# Patient Record
Sex: MALE | Race: WHITE | Employment: OTHER
[De-identification: names, ages, dates, MRNs, and addresses within clinical notes are randomized per-mention and may not be internally consistent; named-entity substitution may affect disease eponyms.]

---

## 2023-03-27 SDOH — HEALTH STABILITY: PHYSICAL HEALTH: ON AVERAGE, HOW MANY DAYS PER WEEK DO YOU ENGAGE IN MODERATE TO STRENUOUS EXERCISE (LIKE A BRISK WALK)?: 3 DAYS

## 2023-03-27 SDOH — HEALTH STABILITY: PHYSICAL HEALTH: ON AVERAGE, HOW MANY MINUTES DO YOU ENGAGE IN EXERCISE AT THIS LEVEL?: 30 MIN

## 2023-03-29 ENCOUNTER — OFFICE VISIT (OUTPATIENT)
Dept: INTERNAL MEDICINE CLINIC | Facility: CLINIC | Age: 63
End: 2023-03-29
Payer: MEDICARE

## 2023-03-29 VITALS
HEIGHT: 68 IN | HEART RATE: 76 BPM | DIASTOLIC BLOOD PRESSURE: 73 MMHG | BODY MASS INDEX: 24.22 KG/M2 | SYSTOLIC BLOOD PRESSURE: 130 MMHG | WEIGHT: 159.8 LBS | TEMPERATURE: 98 F | OXYGEN SATURATION: 93 %

## 2023-03-29 DIAGNOSIS — Z11.4 ENCOUNTER FOR SCREENING FOR HIV: ICD-10-CM

## 2023-03-29 DIAGNOSIS — Z76.89 ESTABLISHING CARE WITH NEW DOCTOR, ENCOUNTER FOR: Primary | ICD-10-CM

## 2023-03-29 DIAGNOSIS — Z86.69 HX OF RETINAL DETACHMENT: ICD-10-CM

## 2023-03-29 DIAGNOSIS — H91.93 BILATERAL HEARING LOSS, UNSPECIFIED HEARING LOSS TYPE: ICD-10-CM

## 2023-03-29 DIAGNOSIS — R06.2 WHEEZING: ICD-10-CM

## 2023-03-29 DIAGNOSIS — Z11.59 NEED FOR HEPATITIS C SCREENING TEST: ICD-10-CM

## 2023-03-29 DIAGNOSIS — E78.00 PURE HYPERCHOLESTEROLEMIA: Primary | ICD-10-CM

## 2023-03-29 DIAGNOSIS — G47.30 SLEEP APNEA, UNSPECIFIED TYPE: ICD-10-CM

## 2023-03-29 DIAGNOSIS — Z98.890 HX OF INGUINAL HERNIA REPAIR: ICD-10-CM

## 2023-03-29 DIAGNOSIS — L82.1 SEBORRHEIC KERATOSIS: ICD-10-CM

## 2023-03-29 DIAGNOSIS — R79.9 ABNORMAL FINDING OF BLOOD CHEMISTRY, UNSPECIFIED: ICD-10-CM

## 2023-03-29 DIAGNOSIS — Z87.19 HX OF INGUINAL HERNIA REPAIR: ICD-10-CM

## 2023-03-29 DIAGNOSIS — E55.9 VITAMIN D DEFICIENCY: ICD-10-CM

## 2023-03-29 DIAGNOSIS — J45.20 MILD INTERMITTENT ASTHMA, UNSPECIFIED WHETHER COMPLICATED: ICD-10-CM

## 2023-03-29 DIAGNOSIS — J30.2 SEASONAL ALLERGIES: ICD-10-CM

## 2023-03-29 DIAGNOSIS — R53.82 CHRONIC FATIGUE: ICD-10-CM

## 2023-03-29 DIAGNOSIS — Z13.220 LIPID SCREENING: ICD-10-CM

## 2023-03-29 LAB
ALBUMIN SERPL-MCNC: 3.8 G/DL (ref 3.2–4.6)
ALBUMIN/GLOB SERPL: 1.2 (ref 0.4–1.6)
ALP SERPL-CCNC: 60 U/L (ref 50–136)
ALT SERPL-CCNC: 35 U/L (ref 12–65)
ANION GAP SERPL CALC-SCNC: 2 MMOL/L (ref 2–11)
AST SERPL-CCNC: 27 U/L (ref 15–37)
BASOPHILS # BLD: 0.1 K/UL (ref 0–0.2)
BASOPHILS NFR BLD: 2 % (ref 0–2)
BILIRUB SERPL-MCNC: 0.4 MG/DL (ref 0.2–1.1)
BUN SERPL-MCNC: 33 MG/DL (ref 8–23)
CALCIUM SERPL-MCNC: 9.3 MG/DL (ref 8.3–10.4)
CHLORIDE SERPL-SCNC: 111 MMOL/L (ref 101–110)
CHOLEST SERPL-MCNC: 179 MG/DL
CO2 SERPL-SCNC: 27 MMOL/L (ref 21–32)
CREAT SERPL-MCNC: 0.9 MG/DL (ref 0.8–1.5)
DIFFERENTIAL METHOD BLD: ABNORMAL
EOSINOPHIL # BLD: 0.5 K/UL (ref 0–0.8)
EOSINOPHIL NFR BLD: 10 % (ref 0.5–7.8)
ERYTHROCYTE [DISTWIDTH] IN BLOOD BY AUTOMATED COUNT: 14.4 % (ref 11.9–14.6)
GLOBULIN SER CALC-MCNC: 3.2 G/DL (ref 2.8–4.5)
GLUCOSE SERPL-MCNC: 84 MG/DL (ref 65–100)
HCT VFR BLD AUTO: 38.1 % (ref 41.1–50.3)
HDLC SERPL-MCNC: 32 MG/DL (ref 40–60)
HDLC SERPL: 5.6
HGB BLD-MCNC: 11.8 G/DL (ref 13.6–17.2)
HIV 1+2 AB+HIV1 P24 AG SERPL QL IA: NONREACTIVE
HIV 1/2 RESULT COMMENT: NORMAL
IMM GRANULOCYTES # BLD AUTO: 0 K/UL (ref 0–0.5)
IMM GRANULOCYTES NFR BLD AUTO: 0 % (ref 0–5)
LDLC SERPL CALC-MCNC: 128.8 MG/DL
LYMPHOCYTES # BLD: 1.6 K/UL (ref 0.5–4.6)
LYMPHOCYTES NFR BLD: 35 % (ref 13–44)
MCH RBC QN AUTO: 29.6 PG (ref 26.1–32.9)
MCHC RBC AUTO-ENTMCNC: 31 G/DL (ref 31.4–35)
MCV RBC AUTO: 95.7 FL (ref 82–102)
MONOCYTES # BLD: 0.6 K/UL (ref 0.1–1.3)
MONOCYTES NFR BLD: 13 % (ref 4–12)
NEUTS SEG # BLD: 1.8 K/UL (ref 1.7–8.2)
NEUTS SEG NFR BLD: 40 % (ref 43–78)
NRBC # BLD: 0 K/UL (ref 0–0.2)
PLATELET # BLD AUTO: 312 K/UL (ref 150–450)
PMV BLD AUTO: 10.5 FL (ref 9.4–12.3)
POTASSIUM SERPL-SCNC: 4.4 MMOL/L (ref 3.5–5.1)
PROT SERPL-MCNC: 7 G/DL (ref 6.3–8.2)
RBC # BLD AUTO: 3.98 M/UL (ref 4.23–5.6)
SODIUM SERPL-SCNC: 140 MMOL/L (ref 133–143)
TRIGL SERPL-MCNC: 91 MG/DL (ref 35–150)
TSH W FREE THYROID IF ABNORMAL: 0.99 UIU/ML (ref 0.36–3.74)
VLDLC SERPL CALC-MCNC: 18.2 MG/DL (ref 6–23)
WBC # BLD AUTO: 4.5 K/UL (ref 4.3–11.1)

## 2023-03-29 PROCEDURE — 99204 OFFICE O/P NEW MOD 45 MIN: CPT | Performed by: INTERNAL MEDICINE

## 2023-03-29 RX ORDER — MULTIVITAMINS WITH FLUORIDE 0.25 MG
1 TABLET,CHEWABLE ORAL DAILY
COMMUNITY
Start: 2011-04-08

## 2023-03-29 RX ORDER — MULTIVIT WITH MIN/MFOLATE/K2 340-15/3 G
5000 POWDER (GRAM) ORAL DAILY
Qty: 90 CAPSULE | Refills: 3 | Status: SHIPPED | OUTPATIENT
Start: 2023-03-29

## 2023-03-29 RX ORDER — PAROXETINE HYDROCHLORIDE 20 MG/1
20 TABLET, FILM COATED ORAL DAILY
COMMUNITY
Start: 2016-08-17

## 2023-03-29 RX ORDER — LEVOCETIRIZINE DIHYDROCHLORIDE 5 MG/1
5 TABLET, FILM COATED ORAL NIGHTLY
Qty: 30 TABLET | Refills: 3 | Status: SHIPPED | OUTPATIENT
Start: 2023-03-29

## 2023-03-29 RX ORDER — ALBUTEROL SULFATE 90 UG/1
2 AEROSOL, METERED RESPIRATORY (INHALATION) 4 TIMES DAILY PRN
Qty: 54 G | Refills: 1 | Status: SHIPPED | OUTPATIENT
Start: 2023-03-29

## 2023-03-29 SDOH — ECONOMIC STABILITY: INCOME INSECURITY: HOW HARD IS IT FOR YOU TO PAY FOR THE VERY BASICS LIKE FOOD, HOUSING, MEDICAL CARE, AND HEATING?: NOT HARD AT ALL

## 2023-03-29 SDOH — ECONOMIC STABILITY: FOOD INSECURITY: WITHIN THE PAST 12 MONTHS, YOU WORRIED THAT YOUR FOOD WOULD RUN OUT BEFORE YOU GOT MONEY TO BUY MORE.: NEVER TRUE

## 2023-03-29 SDOH — ECONOMIC STABILITY: FOOD INSECURITY: WITHIN THE PAST 12 MONTHS, THE FOOD YOU BOUGHT JUST DIDN'T LAST AND YOU DIDN'T HAVE MONEY TO GET MORE.: NEVER TRUE

## 2023-03-29 SDOH — ECONOMIC STABILITY: HOUSING INSECURITY
IN THE LAST 12 MONTHS, WAS THERE A TIME WHEN YOU DID NOT HAVE A STEADY PLACE TO SLEEP OR SLEPT IN A SHELTER (INCLUDING NOW)?: NO

## 2023-03-29 ASSESSMENT — ENCOUNTER SYMPTOMS
SINUS PRESSURE: 0
EYES NEGATIVE: 1
WHEEZING: 1
DIARRHEA: 0
NAUSEA: 0
CHEST TIGHTNESS: 0
CONSTIPATION: 0
VOMITING: 0
RHINORRHEA: 0
SHORTNESS OF BREATH: 0
SINUS PAIN: 0

## 2023-03-29 ASSESSMENT — ANXIETY QUESTIONNAIRES
1. FEELING NERVOUS, ANXIOUS, OR ON EDGE: 2
GAD7 TOTAL SCORE: 14
2. NOT BEING ABLE TO STOP OR CONTROL WORRYING: 2
7. FEELING AFRAID AS IF SOMETHING AWFUL MIGHT HAPPEN: 2
4. TROUBLE RELAXING: 2
6. BECOMING EASILY ANNOYED OR IRRITABLE: 2
IF YOU CHECKED OFF ANY PROBLEMS ON THIS QUESTIONNAIRE, HOW DIFFICULT HAVE THESE PROBLEMS MADE IT FOR YOU TO DO YOUR WORK, TAKE CARE OF THINGS AT HOME, OR GET ALONG WITH OTHER PEOPLE: SOMEWHAT DIFFICULT
3. WORRYING TOO MUCH ABOUT DIFFERENT THINGS: 2
5. BEING SO RESTLESS THAT IT IS HARD TO SIT STILL: 2

## 2023-03-29 ASSESSMENT — PATIENT HEALTH QUESTIONNAIRE - PHQ9
SUM OF ALL RESPONSES TO PHQ QUESTIONS 1-9: 1
2. FEELING DOWN, DEPRESSED OR HOPELESS: 0
1. LITTLE INTEREST OR PLEASURE IN DOING THINGS: 1
SUM OF ALL RESPONSES TO PHQ QUESTIONS 1-9: 1
SUM OF ALL RESPONSES TO PHQ QUESTIONS 1-9: 1
SUM OF ALL RESPONSES TO PHQ9 QUESTIONS 1 & 2: 1
SUM OF ALL RESPONSES TO PHQ QUESTIONS 1-9: 1

## 2023-03-29 NOTE — ASSESSMENT & PLAN NOTE
Referring to [facility] [place] [facility] for routine exam and continued treatmetn

## 2023-03-29 NOTE — PROGRESS NOTES
shaped. Forehead left sided 1.2 cm linear. 14. Hx of inguinal hernia repair  Assessment & Plan:   Right sided inguinal hernia repair that causes discomfort with use of tighter clothing. Wearing pants several sizes too large to compensate. SUBJECTIVE/OBJECTIVE:  HPI: Patient is a very pleasant 58-year-old man originally from [place] recently transported to the [facility] presenting to establish care with new PCP. Patient with medical history significant for right-sided inguinal hernia repair, retinal detachment, bilateral hearing loss with use of hearing aids, asthma, seasonal allergies, vitamin D deficiency. Patient is agreeable to routine labs at this time including TSH, CBC, CMP, vitamin D, lipid. We will screen for hepatitis C as well as HIV at this time. Referring at this time to pulmonology ophthalmology ear nose and throat and sleep medicine. Social History     Tobacco Use    Smoking status: Never    Smokeless tobacco: Never   Vaping Use    Vaping Use: Never used   Substance Use Topics    Alcohol use: Yes    Drug use: Never     Vitals:    03/29/23 0907   BP: 130/73   Site: Right Upper Arm   Position: Sitting   Cuff Size: Large Adult   Pulse: 76   Temp: 98 °F (36.7 °C)   TempSrc: Temporal   SpO2: 93%   Weight: 159 lb 12.8 oz (72.5 kg)   Height: 5' 8\" (1.727 m)      Body mass index is 24.3 kg/m². Physical Exam  HENT:      Head: Normocephalic. Ears:      Comments: Bilateral hearing aids in place. Eyes:      Extraocular Movements: Extraocular movements intact. Cardiovascular:      Rate and Rhythm: Normal rate and regular rhythm. Pulses: Normal pulses. Skin:     General: Skin is warm and dry. An electronic signature was used to authenticate this note.   Huy Haque DO
dementia and alzhimers runs in family; forgetting recent conversation and common places. Objective   Physical Exam  Constitutional:       Appearance: Normal appearance. He is normal weight. HENT:      Head: Normocephalic and atraumatic. Nose: Nose normal.      Mouth/Throat:      Mouth: Mucous membranes are moist.   Eyes:      Extraocular Movements: Extraocular movements intact. Pupils: Pupils are equal, round, and reactive to light. Cardiovascular:      Rate and Rhythm: Normal rate and regular rhythm. Pulses: Normal pulses. Heart sounds: Normal heart sounds. Pulmonary:      Effort: Pulmonary effort is normal.      Breath sounds: Normal breath sounds. Abdominal:      General: Abdomen is flat. Musculoskeletal:         General: Normal range of motion. Cervical back: Normal range of motion. Skin:     General: Skin is warm and dry. Findings: Lesion present. Comments: Midback lesion; seborrheic keratosis   Neurological:      General: No focal deficit present. Mental Status: He is alert and oriented to person, place, and time. Mental status is at baseline. {Time Documentation Optional:[id number]}      An electronic signature was used to authenticate this note.     --Mariluz Bernabe

## 2023-03-29 NOTE — ASSESSMENT & PLAN NOTE
Right sided inguinal hernia repair that causes discomfort with use of tighter clothing. Wearing pants several sizes too large to compensate.

## 2023-03-29 NOTE — ASSESSMENT & PLAN NOTE
Offering referral to Pulmonology for PFT's Remote dx of asthma. No albugterol prescribed wheezing prominent. Offering Albuterol at this time.

## 2023-03-29 NOTE — ASSESSMENT & PLAN NOTE
Presenting to establish care today. NO medical information currently available in EMR. Age eligible for all routine screening and vaccination methods.

## 2023-03-30 LAB
25(OH)D3 SERPL-MCNC: 28.4 NG/ML (ref 30–100)
EST. AVERAGE GLUCOSE BLD GHB EST-MCNC: 100 MG/DL
HBA1C MFR BLD: 5.1 % (ref 4.8–5.6)
HCV AB SER QL: NONREACTIVE

## 2023-03-30 RX ORDER — ATORVASTATIN CALCIUM 20 MG/1
20 TABLET, FILM COATED ORAL DAILY
Qty: 30 TABLET | Refills: 3 | Status: SHIPPED | OUTPATIENT
Start: 2023-03-30

## 2023-04-24 ENCOUNTER — OFFICE VISIT (OUTPATIENT)
Dept: INTERNAL MEDICINE CLINIC | Facility: CLINIC | Age: 63
End: 2023-04-24
Payer: MEDICARE

## 2023-04-24 VITALS
HEART RATE: 65 BPM | OXYGEN SATURATION: 95 % | DIASTOLIC BLOOD PRESSURE: 81 MMHG | TEMPERATURE: 98 F | HEIGHT: 68 IN | BODY MASS INDEX: 23.58 KG/M2 | WEIGHT: 155.6 LBS | SYSTOLIC BLOOD PRESSURE: 139 MMHG

## 2023-04-24 DIAGNOSIS — J45.20 MILD INTERMITTENT ASTHMA, UNSPECIFIED WHETHER COMPLICATED: Primary | ICD-10-CM

## 2023-04-24 DIAGNOSIS — F41.9 ANXIETY: ICD-10-CM

## 2023-04-24 DIAGNOSIS — E78.00 PURE HYPERCHOLESTEROLEMIA: ICD-10-CM

## 2023-04-24 DIAGNOSIS — E78.00 HIGH CHOLESTEROL: ICD-10-CM

## 2023-04-24 DIAGNOSIS — R06.2 WHEEZING: ICD-10-CM

## 2023-04-24 DIAGNOSIS — E55.9 VITAMIN D DEFICIENCY: ICD-10-CM

## 2023-04-24 PROCEDURE — 99214 OFFICE O/P EST MOD 30 MIN: CPT | Performed by: INTERNAL MEDICINE

## 2023-04-24 RX ORDER — PAROXETINE HYDROCHLORIDE 20 MG/1
20 TABLET, FILM COATED ORAL DAILY
Qty: 90 TABLET | Refills: 1 | Status: SHIPPED | OUTPATIENT
Start: 2023-04-24 | End: 2023-07-23

## 2023-04-24 RX ORDER — ATORVASTATIN CALCIUM 40 MG/1
40 TABLET, FILM COATED ORAL DAILY
Qty: 90 TABLET | Refills: 1 | Status: SHIPPED | OUTPATIENT
Start: 2023-04-24 | End: 2023-07-23

## 2023-04-24 RX ORDER — ALBUTEROL SULFATE 90 UG/1
2 AEROSOL, METERED RESPIRATORY (INHALATION) 4 TIMES DAILY PRN
Qty: 54 G | Refills: 3 | Status: SHIPPED | OUTPATIENT
Start: 2023-04-24

## 2023-04-24 ASSESSMENT — PATIENT HEALTH QUESTIONNAIRE - PHQ9
2. FEELING DOWN, DEPRESSED OR HOPELESS: 0
SUM OF ALL RESPONSES TO PHQ QUESTIONS 1-9: 0
1. LITTLE INTEREST OR PLEASURE IN DOING THINGS: 0
SUM OF ALL RESPONSES TO PHQ9 QUESTIONS 1 & 2: 0

## 2023-04-24 NOTE — ASSESSMENT & PLAN NOTE
Worsening symptoms with relocation. Only using albuterol as rescue. Offering Qvar bid at this time as step up therapy.

## 2023-04-24 NOTE — ACP (ADVANCE CARE PLANNING)
Advance Care Planning   The patient has the following advanced directives on file:  Advance Directives       Power of [address] ACP-Advance Directive ACP-Power of     Not on File Not on File Not on File Not on File            The patient has appointed the following active healthcare agents:    Primary Decision Maker: [facility] - [phone]    The Patient has the following current code status:    Code Status: Not on file    Visit Documentation:  I discussed [address] with Stephanie Alegria today which included the importance of making their choices for care and treatment in the case of a health event that adversely affects their decision-making abilities. He has not completed the Advance Care Directives. He does not have an active health care agent at this time. Stephanie Alegria was encouraged to complete the declaration forms and provide a signed copy of his medical records.        Aman Hurley  4/24/2023

## 2023-04-24 NOTE — ASSESSMENT & PLAN NOTE
Patient with increase in asthmatic symptoms after relocating to the area. Prescribed albuterol alone. Offering Qvar as ICS step up.

## 2023-04-24 NOTE — ASSESSMENT & PLAN NOTE
Lab Results   Component Value Date    CHOL 179 03/29/2023     Lab Results   Component Value Date    TRIG 91 03/29/2023     Lab Results   Component Value Date    HDL 32 (L) 03/29/2023     Lab Results   Component Value Date    LDLCALC 128.8 (H) 03/29/2023     Lab Results   Component Value Date    LABVLDL 18.2 03/29/2023     Lab Results   Component Value Date    CHOLHDLRATIO 5.6 03/29/2023     Key Hyperlipidemia Meds          atorvastatin (LIPITOR) 20 MG tablet (Taking)    Sig - Route: Take 1 tablet by mouth daily - Oral        Will continue at increased dose of 40 mg nightly.    The 10-year ASCVD risk score (Bryon DK, et al., 2019) is: 15.2%    Values used to calculate the score:      Age: 60 years      Sex: Male      Is Non- : No      Diabetic: No      Tobacco smoker: No      Systolic Blood Pressure: 413 mmHg      Is BP treated: No      HDL Cholesterol: 32 MG/DL      Total Cholesterol: 179 MG/DL

## 2023-04-24 NOTE — PROGRESS NOTES
Susy Ludwig (: 1960) is a 60 y.o. male, here for evaluation of the following chief complaint(s):  Discuss Labs (Pt is here to discuss previous lab results.)       ASSESSMENT/PLAN:  1. Mild intermittent asthma, unspecified whether complicated  Assessment & Plan:  Patient with increase in asthmatic symptoms after relocating to the area. Prescribed albuterol alone. Offering Qvar as ICS step up. Orders:  -     beclomethasone (QVAR REDIHALER) 40 MCG/ACT AERB inhaler; Inhale 1 puff into the lungs in the morning and 1 puff in the evening. Rinse mouth after use. ., Disp-2 each, R-3Normal  2. Pure hypercholesterolemia  -     atorvastatin (LIPITOR) 40 MG tablet; Take 1 tablet by mouth daily, Disp-90 tablet, R-1Normal  3. Wheezing  Assessment & Plan:   Worsening symptoms with relocation. Only using albuterol as rescue. Offering Qvar bid at this time as step up therapy. Orders:  -     albuterol sulfate HFA (VENTOLIN HFA) 108 (90 Base) MCG/ACT inhaler; Inhale 2 puffs into the lungs 4 times daily as needed for Wheezing, Disp-54 g, R-3Normal  4. Anxiety  Assessment & Plan: Tolerant of Paxil. Will refill. Orders:  -     PARoxetine (PAXIL) 20 MG tablet; Take 1 tablet by mouth daily, Disp-90 tablet, R-1Normal  5. High cholesterol  Assessment & Plan:  Lab Results   Component Value Date    CHOL 179 2023     Lab Results   Component Value Date    TRIG 91 2023     Lab Results   Component Value Date    HDL 32 (L) 2023     Lab Results   Component Value Date    LDLCALC 128.8 (H) 2023     Lab Results   Component Value Date    LABVLDL 18.2 2023     Lab Results   Component Value Date    CHOLHDLRATIO 5.6 2023     Key Hyperlipidemia Meds            atorvastatin (LIPITOR) 20 MG tablet (Taking)    Sig - Route: Take 1 tablet by mouth daily - Oral          Will continue at increased dose of 40 mg nightly.    The 10-year ASCVD risk score (Bryon ESQUIVEL, et al., 2019) is: 15.2%    Values used to

## 2023-04-28 PROBLEM — Z11.59 NEED FOR HEPATITIS C SCREENING TEST: Status: RESOLVED | Noted: 2023-03-29 | Resolved: 2023-04-28

## 2023-04-28 PROBLEM — Z11.4 ENCOUNTER FOR SCREENING FOR HIV: Status: RESOLVED | Noted: 2023-03-29 | Resolved: 2023-04-28

## 2023-04-28 PROBLEM — Z13.220 LIPID SCREENING: Status: RESOLVED | Noted: 2023-03-29 | Resolved: 2023-04-28

## 2023-05-17 DIAGNOSIS — E78.00 PURE HYPERCHOLESTEROLEMIA: ICD-10-CM

## 2023-05-17 DIAGNOSIS — F41.9 ANXIETY: ICD-10-CM

## 2023-05-17 DIAGNOSIS — R06.2 WHEEZING: ICD-10-CM

## 2023-05-17 RX ORDER — ATORVASTATIN CALCIUM 40 MG/1
40 TABLET, FILM COATED ORAL DAILY
Qty: 90 TABLET | Refills: 1 | Status: SHIPPED | OUTPATIENT
Start: 2023-05-17 | End: 2023-08-15

## 2023-05-17 RX ORDER — LEVOCETIRIZINE DIHYDROCHLORIDE 5 MG/1
5 TABLET, FILM COATED ORAL NIGHTLY
Qty: 90 TABLET | Refills: 1 | Status: SHIPPED | OUTPATIENT
Start: 2023-05-17 | End: 2023-08-15

## 2023-05-17 RX ORDER — PAROXETINE HYDROCHLORIDE 20 MG/1
20 TABLET, FILM COATED ORAL DAILY
Qty: 90 TABLET | Refills: 1 | Status: SHIPPED | OUTPATIENT
Start: 2023-05-17 | End: 2023-08-15

## 2023-05-17 NOTE — TELEPHONE ENCOUNTER
----- Message from Silverio Kaur sent at 5/16/2023  9:32 AM EDT -----  Subject: Message to Provider    QUESTIONS  Information for Provider? Johanna calling from [employer]. The PT is   requesting to have 3 medications changed to home delivery with 90 day   supply RX. The meds are levocetirizine (XYZAL) 5 MG tablet, atorvastatin   (LIPITOR) 40 MG tablet, and PARoxetine (PAXIL) 20 MG tablet. Any agent   that answers when called can assist with this. It does not have to be   Johanna.   ---------------------------------------------------------------------------  --------------  [address]  [phone]; Do not leave any message, patient will call back for answer  ---------------------------------------------------------------------------  --------------  SCRIPT ANSWERS  Relationship to Patient? Covered Entity  Covered Entity Type? Pharmacy? Representative Name?  Kenia Mac

## 2023-05-23 ENCOUNTER — NURSE ONLY (OUTPATIENT)
Dept: PULMONOLOGY | Age: 63
End: 2023-05-23
Payer: MEDICARE

## 2023-05-23 DIAGNOSIS — R06.2 WHEEZING: Primary | ICD-10-CM

## 2023-05-23 LAB
FEV 1 , POC: 1.72 L
FEV1 % PRED, POC: 50 %
FEV1/FVC, POC: NORMAL
FVC % PRED, POC: 65 %
FVC, POC: NORMAL

## 2023-05-23 PROCEDURE — 94060 EVALUATION OF WHEEZING: CPT | Performed by: INTERNAL MEDICINE

## 2023-05-23 PROCEDURE — 94729 DIFFUSING CAPACITY: CPT | Performed by: INTERNAL MEDICINE

## 2023-05-23 PROCEDURE — 94726 PLETHYSMOGRAPHY LUNG VOLUMES: CPT | Performed by: INTERNAL MEDICINE

## 2023-05-23 ASSESSMENT — PULMONARY FUNCTION TESTS
FEV1_PERCENT_PREDICTED_POC: 50
FVC_PERCENT_PREDICTED_POC: 65

## 2023-06-06 ENCOUNTER — OFFICE VISIT (OUTPATIENT)
Dept: SLEEP MEDICINE | Age: 63
End: 2023-06-06
Payer: MEDICARE

## 2023-06-06 VITALS
WEIGHT: 159.8 LBS | DIASTOLIC BLOOD PRESSURE: 60 MMHG | OXYGEN SATURATION: 96 % | TEMPERATURE: 97.3 F | HEART RATE: 84 BPM | HEIGHT: 68 IN | SYSTOLIC BLOOD PRESSURE: 120 MMHG | RESPIRATION RATE: 18 BRPM | BODY MASS INDEX: 24.22 KG/M2

## 2023-06-06 DIAGNOSIS — G47.33 OSA (OBSTRUCTIVE SLEEP APNEA): ICD-10-CM

## 2023-06-06 DIAGNOSIS — G25.81 RLS (RESTLESS LEGS SYNDROME): ICD-10-CM

## 2023-06-06 DIAGNOSIS — G47.34 NOCTURNAL HYPOXEMIA: ICD-10-CM

## 2023-06-06 DIAGNOSIS — G47.52 REM BEHAVIORAL DISORDER: Primary | ICD-10-CM

## 2023-06-06 PROCEDURE — 99203 OFFICE O/P NEW LOW 30 MIN: CPT | Performed by: STUDENT IN AN ORGANIZED HEALTH CARE EDUCATION/TRAINING PROGRAM

## 2023-06-06 RX ORDER — CLONAZEPAM 0.5 MG/1
TABLET ORAL
Qty: 60 TABLET | Refills: 1 | Status: SHIPPED | OUTPATIENT
Start: 2023-06-06 | End: 2023-08-05

## 2023-06-06 NOTE — PATIENT INSTRUCTIONS
Content Version: 13.6  © 2006-2023 Healthwise, [employer]. Care instructions adapted under license by [facility] ([facility]). If you have questions about a medical condition or this instruction, always ask your healthcare professional. [address] any warranty or liability for your use of this information. Sleep Hygiene Instructions    Sleep only as much as you need to feel refreshed during the following day. Restricting your time in bed helps to consolidate and deepen your sleep. Excessively long times in bed lead to fragmented and shallow sleep. Get up at your regular time the next day, no matter how little your slept. Get up at the same time each day, 7 days a week. A regular wake time in the morning leads to regular times on sleep onset, and helps to set your biological clock. Exercise regularly. Schedule exercise times so that they do not occur within 3 hours of when you intend to go to bed. Exercise makes it easier to initiate sleep and deepen sleep. Don't take your problems to bed. Plan some time earlier in the evening for working on your problems or planning the next day's activities. Worrying may interfere with initiating sleep and produce shallow sleep. Train yourself to use the bedroom only for sleep and sexual activity. This will help condition your brain to see bed as the place for sleeping. Do not read, watch TV or eat in bed. Do not try and fall asleep. This only makes the problem worse. Instead, turn on the light, leave the bedroom, and do something different like reading a book. Don't engage in stimulating activity. Return to bed only when you feel sleepy. Avoid long naps. Staying awake during the day helps to fall asleep at night. Naps totalling more than 30 minutes increase your chances of having trouble sleeping at night. Make sure that your bedroom is comfortable and free from light and noise.  A comfortable, noise-free sleep environment

## 2023-06-23 ENCOUNTER — HOSPITAL ENCOUNTER (OUTPATIENT)
Dept: SLEEP CENTER | Age: 63
Discharge: HOME | End: 2023-06-26
Payer: COMMERCIAL

## 2023-06-23 PROCEDURE — 95811 POLYSOM 6/>YRS CPAP 4/> PARM: CPT

## 2023-07-12 ENCOUNTER — OFFICE VISIT (OUTPATIENT)
Dept: AUDIOLOGY | Age: 63
End: 2023-07-12
Payer: MEDICARE

## 2023-07-12 ENCOUNTER — TELEPHONE (OUTPATIENT)
Dept: SLEEP MEDICINE | Age: 63
End: 2023-07-12

## 2023-07-12 ENCOUNTER — OFFICE VISIT (OUTPATIENT)
Dept: ENT CLINIC | Age: 63
End: 2023-07-12
Payer: MEDICARE

## 2023-07-12 VITALS
WEIGHT: 163 LBS | HEART RATE: 75 BPM | HEIGHT: 68 IN | BODY MASS INDEX: 24.71 KG/M2 | RESPIRATION RATE: 17 BRPM | OXYGEN SATURATION: 98 %

## 2023-07-12 DIAGNOSIS — G47.33 OSA (OBSTRUCTIVE SLEEP APNEA): Primary | ICD-10-CM

## 2023-07-12 DIAGNOSIS — H90.3 SENSORINEURAL HEARING LOSS, BILATERAL: Primary | ICD-10-CM

## 2023-07-12 DIAGNOSIS — H90.3 SENSORINEURAL HEARING LOSS (SNHL) OF BOTH EARS: Primary | ICD-10-CM

## 2023-07-12 DIAGNOSIS — H61.23 BILATERAL IMPACTED CERUMEN: ICD-10-CM

## 2023-07-12 PROCEDURE — 99203 OFFICE O/P NEW LOW 30 MIN: CPT | Performed by: STUDENT IN AN ORGANIZED HEALTH CARE EDUCATION/TRAINING PROGRAM

## 2023-07-12 PROCEDURE — 92557 COMPREHENSIVE HEARING TEST: CPT | Performed by: AUDIOLOGIST

## 2023-07-12 PROCEDURE — G0268 REMOVAL OF IMPACTED WAX MD: HCPCS | Performed by: STUDENT IN AN ORGANIZED HEALTH CARE EDUCATION/TRAINING PROGRAM

## 2023-07-12 ASSESSMENT — ENCOUNTER SYMPTOMS
DIARRHEA: 0
WHEEZING: 0
SINUS PAIN: 0
FACIAL SWELLING: 0
APNEA: 0
SINUS PRESSURE: 0
NAUSEA: 0
EYE PAIN: 0
COUGH: 0
SHORTNESS OF BREATH: 0
CONSTIPATION: 0
STRIDOR: 0
EYE ITCHING: 0
EYE DISCHARGE: 0
CHOKING: 0

## 2023-07-12 NOTE — PROGRESS NOTES
HPI:  Xi Watts is a 60 y.o. male seen New    Chief Complaint   Patient presents with    Hearing Problem     Patient presents today for hearing screens , patient would like to update current HA and has recently moved to [place] . Patient has cerumen present and needs debridement before test .        79-year-old male seen as a new patient referral evaluation with a history of hearing loss. He and his wife just moved here to [place] from [place]. He has been using hearing aids for the last couple years with good benefit. He is potentially seeking advice on new hearing aids. His hearing has been relatively stable. He has had some cerumen in the past.  He denies any current otalgia otorrhea tinnitus dizziness or vertigo. No significant otologic history over his lifetime. Past Medical History, Past Surgical History, Family history, Social History, and Medications were all reviewed with the patient today and updated as necessary.      Allergies   Allergen Reactions    Cat Hair Extract Shortness Of Breath       Patient Active Problem List   Diagnosis    Establishing care with new doctor, encounter for    Sleep apnea    Bilateral hearing loss    Vitamin D deficiency    Chronic fatigue    Seasonal allergies    Mild intermittent asthma    Hx of retinal detachment    Seborrheic keratosis    Wheezing    Hx of inguinal hernia repair    Anxiety    High cholesterol       Current Outpatient Medications   Medication Sig    clonazePAM (KLONOPIN) 0.5 MG tablet Take 1-2 tablets nightly for REM Behavior disorder    atorvastatin (LIPITOR) 40 MG tablet Take 1 tablet by mouth daily    levocetirizine (XYZAL) 5 MG tablet Take 1 tablet by mouth nightly    PARoxetine (PAXIL) 20 MG tablet Take 1 tablet by mouth daily    albuterol sulfate HFA (VENTOLIN HFA) 108 (90 Base) MCG/ACT inhaler Inhale 2 puffs into the lungs 4 times daily as needed for Wheezing    beclomethasone (QVAR REDIHALER) 40 MCG/ACT AERB inhaler Inhale 1 puff into

## 2023-07-12 NOTE — TELEPHONE ENCOUNTER
Patient had recent sleep study showing mild sleep apnea. Cpap therapy is recommended per sleep interp. Patient has agreed to start CPAP therapy. Order needs to be sent to [address].      Kasandra Morse CMA

## 2023-07-12 NOTE — PROGRESS NOTES
Roxy had Audiometry performed today. The patient reports hearing loss. Results as follows: Audiometry    Test Performed - Comprehensive Audiogram    Type of Loss - Right Ear: abnormal hearing: degree of loss is normal to severe sensorineural hearing loss                           Left Ear: abnormal hearing: degree of loss is normal to severe sensorineural hearing loss     SRT   Measurement Right Ear Left Ear   Value 25 25   Unit dB dB     Discrimination  Measurement Right Ear Left Ear   Value 92% 92%   Unit dB dB     Recommend  Binaural amplification and annual audios    A.  [facility], [address] [place] [address]  Audiologist No

## 2023-07-31 ENCOUNTER — HOSPITAL ENCOUNTER (INPATIENT)
Age: 63
LOS: 7 days | Discharge: NURSING, REHAB OR LONG-TERM CARE | DRG: 481 | End: 2023-08-07
Attending: EMERGENCY MEDICINE | Admitting: FAMILY MEDICINE
Payer: COMMERCIAL

## 2023-07-31 ENCOUNTER — ANESTHESIA (OUTPATIENT)
Dept: SURGERY | Age: 63
End: 2023-07-31
Payer: COMMERCIAL

## 2023-07-31 ENCOUNTER — ANESTHESIA EVENT (OUTPATIENT)
Dept: SURGERY | Age: 63
End: 2023-07-31
Payer: COMMERCIAL

## 2023-07-31 ENCOUNTER — APPOINTMENT (OUTPATIENT)
Dept: CT IMAGING | Age: 63
DRG: 481 | End: 2023-07-31
Payer: COMMERCIAL

## 2023-07-31 ENCOUNTER — APPOINTMENT (OUTPATIENT)
Dept: GENERAL RADIOLOGY | Age: 63
DRG: 481 | End: 2023-07-31
Payer: COMMERCIAL

## 2023-07-31 DIAGNOSIS — S72.401A CLOSED FRACTURE OF DISTAL END OF RIGHT FEMUR, UNSPECIFIED FRACTURE MORPHOLOGY, INITIAL ENCOUNTER (HCC): Primary | ICD-10-CM

## 2023-07-31 DIAGNOSIS — I47.1 SVT (SUPRAVENTRICULAR TACHYCARDIA) (HCC): ICD-10-CM

## 2023-07-31 PROBLEM — S72.491A CLOSED COMMINUTED INTRA-ARTICULAR FRACTURE OF DISTAL FEMUR, RIGHT, INITIAL ENCOUNTER (HCC): Status: ACTIVE | Noted: 2023-07-31

## 2023-07-31 LAB
ALBUMIN SERPL-MCNC: 3.6 G/DL (ref 3.2–4.6)
ALBUMIN/GLOB SERPL: 1.1 (ref 0.4–1.6)
ALP SERPL-CCNC: 75 U/L (ref 50–136)
ALT SERPL-CCNC: 32 U/L (ref 12–65)
ANION GAP SERPL CALC-SCNC: 5 MMOL/L (ref 2–11)
APTT PPP: 20.6 SEC (ref 24.5–34.2)
AST SERPL-CCNC: 23 U/L (ref 15–37)
BASOPHILS # BLD: 0.1 K/UL (ref 0–0.2)
BASOPHILS NFR BLD: 1 % (ref 0–2)
BILIRUB SERPL-MCNC: 0.4 MG/DL (ref 0.2–1.1)
BUN SERPL-MCNC: 19 MG/DL (ref 8–23)
CALCIUM SERPL-MCNC: 8.8 MG/DL (ref 8.3–10.4)
CHLORIDE SERPL-SCNC: 111 MMOL/L (ref 101–110)
CO2 SERPL-SCNC: 27 MMOL/L (ref 21–32)
CREAT SERPL-MCNC: 1 MG/DL (ref 0.8–1.5)
DIFFERENTIAL METHOD BLD: ABNORMAL
EKG ATRIAL RATE: 63 BPM
EKG DIAGNOSIS: NORMAL
EKG P AXIS: 45 DEGREES
EKG P-R INTERVAL: 146 MS
EKG Q-T INTERVAL: 425 MS
EKG QRS DURATION: 84 MS
EKG QTC CALCULATION (BAZETT): 442 MS
EKG R AXIS: 60 DEGREES
EKG T AXIS: 47 DEGREES
EKG VENTRICULAR RATE: 65 BPM
EOSINOPHIL # BLD: 0.2 K/UL (ref 0–0.8)
EOSINOPHIL NFR BLD: 2 % (ref 0.5–7.8)
ERYTHROCYTE [DISTWIDTH] IN BLOOD BY AUTOMATED COUNT: 14.6 % (ref 11.9–14.6)
GLOBULIN SER CALC-MCNC: 3.2 G/DL (ref 2.8–4.5)
GLUCOSE SERPL-MCNC: 96 MG/DL (ref 65–100)
HCT VFR BLD AUTO: 37.9 % (ref 41.1–50.3)
HGB BLD-MCNC: 11.7 G/DL (ref 13.6–17.2)
IMM GRANULOCYTES # BLD AUTO: 0 K/UL (ref 0–0.5)
IMM GRANULOCYTES NFR BLD AUTO: 0 % (ref 0–5)
INR PPP: 1.1
LYMPHOCYTES # BLD: 1.5 K/UL (ref 0.5–4.6)
LYMPHOCYTES NFR BLD: 17 % (ref 13–44)
MCH RBC QN AUTO: 29.3 PG (ref 26.1–32.9)
MCHC RBC AUTO-ENTMCNC: 30.9 G/DL (ref 31.4–35)
MCV RBC AUTO: 95 FL (ref 82–102)
MONOCYTES # BLD: 0.6 K/UL (ref 0.1–1.3)
MONOCYTES NFR BLD: 7 % (ref 4–12)
NEUTS SEG # BLD: 6.4 K/UL (ref 1.7–8.2)
NEUTS SEG NFR BLD: 73 % (ref 43–78)
NRBC # BLD: 0 K/UL (ref 0–0.2)
PLATELET # BLD AUTO: 185 K/UL (ref 150–450)
PLATELET COMMENT: ADEQUATE
PMV BLD AUTO: 10.4 FL (ref 9.4–12.3)
POTASSIUM SERPL-SCNC: 4.2 MMOL/L (ref 3.5–5.1)
PROT SERPL-MCNC: 6.8 G/DL (ref 6.3–8.2)
PROTHROMBIN TIME: 14.3 SEC (ref 12.6–14.3)
RBC # BLD AUTO: 3.99 M/UL (ref 4.23–5.6)
RBC MORPH BLD: ABNORMAL
SODIUM SERPL-SCNC: 143 MMOL/L (ref 133–143)
WBC # BLD AUTO: 8.8 K/UL (ref 4.3–11.1)
WBC MORPH BLD: ABNORMAL

## 2023-07-31 PROCEDURE — 71045 X-RAY EXAM CHEST 1 VIEW: CPT

## 2023-07-31 PROCEDURE — 2580000003 HC RX 258: Performed by: ORTHOPAEDIC SURGERY

## 2023-07-31 PROCEDURE — 3700000000 HC ANESTHESIA ATTENDED CARE: Performed by: ORTHOPAEDIC SURGERY

## 2023-07-31 PROCEDURE — 99285 EMERGENCY DEPT VISIT HI MDM: CPT

## 2023-07-31 PROCEDURE — 6370000000 HC RX 637 (ALT 250 FOR IP): Performed by: ORTHOPAEDIC SURGERY

## 2023-07-31 PROCEDURE — 73560 X-RAY EXAM OF KNEE 1 OR 2: CPT

## 2023-07-31 PROCEDURE — 27513 TREATMENT OF THIGH FRACTURE: CPT | Performed by: ORTHOPAEDIC SURGERY

## 2023-07-31 PROCEDURE — 3600000004 HC SURGERY LEVEL 4 BASE: Performed by: ORTHOPAEDIC SURGERY

## 2023-07-31 PROCEDURE — 3600000014 HC SURGERY LEVEL 4 ADDTL 15MIN: Performed by: ORTHOPAEDIC SURGERY

## 2023-07-31 PROCEDURE — 1100000000 HC RM PRIVATE

## 2023-07-31 PROCEDURE — 93005 ELECTROCARDIOGRAM TRACING: CPT | Performed by: FAMILY MEDICINE

## 2023-07-31 PROCEDURE — 7100000000 HC PACU RECOVERY - FIRST 15 MIN: Performed by: ORTHOPAEDIC SURGERY

## 2023-07-31 PROCEDURE — 93010 ELECTROCARDIOGRAM REPORT: CPT | Performed by: INTERNAL MEDICINE

## 2023-07-31 PROCEDURE — 2500000003 HC RX 250 WO HCPCS: Performed by: NURSE ANESTHETIST, CERTIFIED REGISTERED

## 2023-07-31 PROCEDURE — 7100000001 HC PACU RECOVERY - ADDTL 15 MIN: Performed by: ORTHOPAEDIC SURGERY

## 2023-07-31 PROCEDURE — 6360000002 HC RX W HCPCS: Performed by: ORTHOPAEDIC SURGERY

## 2023-07-31 PROCEDURE — 73552 X-RAY EXAM OF FEMUR 2/>: CPT

## 2023-07-31 PROCEDURE — 0QSB04Z REPOSITION RIGHT LOWER FEMUR WITH INTERNAL FIXATION DEVICE, OPEN APPROACH: ICD-10-PCS | Performed by: ORTHOPAEDIC SURGERY

## 2023-07-31 PROCEDURE — 80053 COMPREHEN METABOLIC PANEL: CPT

## 2023-07-31 PROCEDURE — 3700000001 HC ADD 15 MINUTES (ANESTHESIA): Performed by: ORTHOPAEDIC SURGERY

## 2023-07-31 PROCEDURE — 85610 PROTHROMBIN TIME: CPT

## 2023-07-31 PROCEDURE — 2580000003 HC RX 258: Performed by: NURSE ANESTHETIST, CERTIFIED REGISTERED

## 2023-07-31 PROCEDURE — 6360000002 HC RX W HCPCS: Performed by: NURSE ANESTHETIST, CERTIFIED REGISTERED

## 2023-07-31 PROCEDURE — C1769 GUIDE WIRE: HCPCS | Performed by: ORTHOPAEDIC SURGERY

## 2023-07-31 PROCEDURE — C1713 ANCHOR/SCREW BN/BN,TIS/BN: HCPCS | Performed by: ORTHOPAEDIC SURGERY

## 2023-07-31 PROCEDURE — 73700 CT LOWER EXTREMITY W/O DYE: CPT

## 2023-07-31 PROCEDURE — 85025 COMPLETE CBC W/AUTO DIFF WBC: CPT

## 2023-07-31 PROCEDURE — 2709999900 HC NON-CHARGEABLE SUPPLY: Performed by: ORTHOPAEDIC SURGERY

## 2023-07-31 PROCEDURE — 85730 THROMBOPLASTIN TIME PARTIAL: CPT

## 2023-07-31 PROCEDURE — 2720000010 HC SURG SUPPLY STERILE: Performed by: ORTHOPAEDIC SURGERY

## 2023-07-31 DEVICE — SCREW LCK F/IM NAIL 5X70MM XL25 STR: Type: IMPLANTABLE DEVICE | Status: FUNCTIONAL

## 2023-07-31 DEVICE — SCREW LK F/IM NAIL 5X56MM XL25 ST: Type: IMPLANTABLE DEVICE | Status: FUNCTIONAL

## 2023-07-31 DEVICE — SCREW LCK F/IM NAIL 5X40MM XL25 STR: Type: IMPLANTABLE DEVICE | Status: FUNCTIONAL

## 2023-07-31 DEVICE — SCREW LK F/IM NAIL 5X60MM XL25 ST: Type: IMPLANTABLE DEVICE | Status: FUNCTIONAL

## 2023-07-31 DEVICE — IMPLANTABLE DEVICE: Type: IMPLANTABLE DEVICE | Status: FUNCTIONAL

## 2023-07-31 DEVICE — SCREW LK F/IM NAIL 5X38MM XL25 SILE: Type: IMPLANTABLE DEVICE | Status: FUNCTIONAL

## 2023-07-31 RX ORDER — DEXAMETHASONE SODIUM PHOSPHATE 10 MG/ML
INJECTION INTRAMUSCULAR; INTRAVENOUS PRN
Status: DISCONTINUED | OUTPATIENT
Start: 2023-07-31 | End: 2023-07-31 | Stop reason: SDUPTHER

## 2023-07-31 RX ORDER — ENOXAPARIN SODIUM 100 MG/ML
40 INJECTION SUBCUTANEOUS DAILY
Status: DISCONTINUED | OUTPATIENT
Start: 2023-07-31 | End: 2023-07-31

## 2023-07-31 RX ORDER — ONDANSETRON 2 MG/ML
4 INJECTION INTRAMUSCULAR; INTRAVENOUS
Status: DISCONTINUED | OUTPATIENT
Start: 2023-07-31 | End: 2023-07-31 | Stop reason: HOSPADM

## 2023-07-31 RX ORDER — ONDANSETRON 4 MG/1
4 TABLET, ORALLY DISINTEGRATING ORAL EVERY 8 HOURS PRN
Status: DISCONTINUED | OUTPATIENT
Start: 2023-07-31 | End: 2023-08-03

## 2023-07-31 RX ORDER — LIDOCAINE HYDROCHLORIDE 20 MG/ML
INJECTION, SOLUTION EPIDURAL; INFILTRATION; INTRACAUDAL; PERINEURAL PRN
Status: DISCONTINUED | OUTPATIENT
Start: 2023-07-31 | End: 2023-07-31 | Stop reason: SDUPTHER

## 2023-07-31 RX ORDER — ONDANSETRON 2 MG/ML
INJECTION INTRAMUSCULAR; INTRAVENOUS PRN
Status: DISCONTINUED | OUTPATIENT
Start: 2023-07-31 | End: 2023-07-31 | Stop reason: SDUPTHER

## 2023-07-31 RX ORDER — HYDROMORPHONE HYDROCHLORIDE 2 MG/ML
INJECTION, SOLUTION INTRAMUSCULAR; INTRAVENOUS; SUBCUTANEOUS PRN
Status: DISCONTINUED | OUTPATIENT
Start: 2023-07-31 | End: 2023-07-31 | Stop reason: SDUPTHER

## 2023-07-31 RX ORDER — SUCCINYLCHOLINE CHLORIDE 20 MG/ML
INJECTION INTRAMUSCULAR; INTRAVENOUS PRN
Status: DISCONTINUED | OUTPATIENT
Start: 2023-07-31 | End: 2023-07-31 | Stop reason: SDUPTHER

## 2023-07-31 RX ORDER — HALOPERIDOL 5 MG/ML
1 INJECTION INTRAMUSCULAR
Status: DISCONTINUED | OUTPATIENT
Start: 2023-07-31 | End: 2023-07-31 | Stop reason: HOSPADM

## 2023-07-31 RX ORDER — SODIUM CHLORIDE 0.9 % (FLUSH) 0.9 %
5-40 SYRINGE (ML) INJECTION PRN
Status: DISCONTINUED | OUTPATIENT
Start: 2023-07-31 | End: 2023-08-07 | Stop reason: HOSPADM

## 2023-07-31 RX ORDER — SODIUM CHLORIDE 0.9 % (FLUSH) 0.9 %
5-40 SYRINGE (ML) INJECTION EVERY 12 HOURS SCHEDULED
Status: DISCONTINUED | OUTPATIENT
Start: 2023-07-31 | End: 2023-08-07 | Stop reason: HOSPADM

## 2023-07-31 RX ORDER — PROPOFOL 10 MG/ML
INJECTION, EMULSION INTRAVENOUS PRN
Status: DISCONTINUED | OUTPATIENT
Start: 2023-07-31 | End: 2023-07-31 | Stop reason: SDUPTHER

## 2023-07-31 RX ORDER — OXYCODONE HYDROCHLORIDE 5 MG/1
10 TABLET ORAL EVERY 4 HOURS PRN
Status: DISCONTINUED | OUTPATIENT
Start: 2023-07-31 | End: 2023-08-07 | Stop reason: HOSPADM

## 2023-07-31 RX ORDER — CLONAZEPAM 0.5 MG/1
0.5 TABLET ORAL EVERY 12 HOURS PRN
Status: DISCONTINUED | OUTPATIENT
Start: 2023-07-31 | End: 2023-08-01

## 2023-07-31 RX ORDER — PAROXETINE HYDROCHLORIDE 20 MG/1
20 TABLET, FILM COATED ORAL DAILY
Status: DISCONTINUED | OUTPATIENT
Start: 2023-07-31 | End: 2023-08-07 | Stop reason: HOSPADM

## 2023-07-31 RX ORDER — HYDROMORPHONE HYDROCHLORIDE 1 MG/ML
0.5 INJECTION, SOLUTION INTRAMUSCULAR; INTRAVENOUS; SUBCUTANEOUS
Status: DISCONTINUED | OUTPATIENT
Start: 2023-07-31 | End: 2023-08-07 | Stop reason: HOSPADM

## 2023-07-31 RX ORDER — HYDROCODONE BITARTRATE AND ACETAMINOPHEN 7.5; 325 MG/1; MG/1
1 TABLET ORAL EVERY 6 HOURS PRN
Status: DISCONTINUED | OUTPATIENT
Start: 2023-07-31 | End: 2023-08-03

## 2023-07-31 RX ORDER — IPRATROPIUM BROMIDE AND ALBUTEROL SULFATE 2.5; .5 MG/3ML; MG/3ML
1 SOLUTION RESPIRATORY (INHALATION)
Status: DISCONTINUED | OUTPATIENT
Start: 2023-07-31 | End: 2023-07-31 | Stop reason: HOSPADM

## 2023-07-31 RX ORDER — FENTANYL CITRATE 50 UG/ML
50 INJECTION, SOLUTION INTRAMUSCULAR; INTRAVENOUS EVERY 5 MIN PRN
Status: DISCONTINUED | OUTPATIENT
Start: 2023-07-31 | End: 2023-07-31 | Stop reason: HOSPADM

## 2023-07-31 RX ORDER — SODIUM CHLORIDE, SODIUM LACTATE, POTASSIUM CHLORIDE, CALCIUM CHLORIDE 600; 310; 30; 20 MG/100ML; MG/100ML; MG/100ML; MG/100ML
INJECTION, SOLUTION INTRAVENOUS ONCE
Status: COMPLETED | OUTPATIENT
Start: 2023-07-31 | End: 2023-07-31

## 2023-07-31 RX ORDER — ACETAMINOPHEN 650 MG/1
650 SUPPOSITORY RECTAL EVERY 6 HOURS PRN
Status: DISCONTINUED | OUTPATIENT
Start: 2023-07-31 | End: 2023-08-07 | Stop reason: HOSPADM

## 2023-07-31 RX ORDER — SODIUM CHLORIDE 0.9 % (FLUSH) 0.9 %
5-40 SYRINGE (ML) INJECTION PRN
Status: DISCONTINUED | OUTPATIENT
Start: 2023-07-31 | End: 2023-07-31 | Stop reason: HOSPADM

## 2023-07-31 RX ORDER — HYDROMORPHONE HYDROCHLORIDE 2 MG/ML
0.5 INJECTION, SOLUTION INTRAMUSCULAR; INTRAVENOUS; SUBCUTANEOUS EVERY 10 MIN PRN
Status: DISCONTINUED | OUTPATIENT
Start: 2023-07-31 | End: 2023-07-31 | Stop reason: HOSPADM

## 2023-07-31 RX ORDER — GLYCOPYRROLATE 0.2 MG/ML
INJECTION INTRAMUSCULAR; INTRAVENOUS PRN
Status: DISCONTINUED | OUTPATIENT
Start: 2023-07-31 | End: 2023-07-31 | Stop reason: SDUPTHER

## 2023-07-31 RX ORDER — FENTANYL CITRATE 50 UG/ML
INJECTION, SOLUTION INTRAMUSCULAR; INTRAVENOUS PRN
Status: DISCONTINUED | OUTPATIENT
Start: 2023-07-31 | End: 2023-07-31 | Stop reason: SDUPTHER

## 2023-07-31 RX ORDER — SODIUM CHLORIDE 9 MG/ML
INJECTION, SOLUTION INTRAVENOUS PRN
Status: DISCONTINUED | OUTPATIENT
Start: 2023-07-31 | End: 2023-08-07 | Stop reason: HOSPADM

## 2023-07-31 RX ORDER — SODIUM CHLORIDE, SODIUM LACTATE, POTASSIUM CHLORIDE, CALCIUM CHLORIDE 600; 310; 30; 20 MG/100ML; MG/100ML; MG/100ML; MG/100ML
INJECTION, SOLUTION INTRAVENOUS CONTINUOUS PRN
Status: DISCONTINUED | OUTPATIENT
Start: 2023-07-31 | End: 2023-07-31 | Stop reason: SDUPTHER

## 2023-07-31 RX ORDER — SODIUM CHLORIDE 9 MG/ML
INJECTION, SOLUTION INTRAVENOUS CONTINUOUS
Status: DISCONTINUED | OUTPATIENT
Start: 2023-07-31 | End: 2023-08-03

## 2023-07-31 RX ORDER — ROCURONIUM BROMIDE 10 MG/ML
INJECTION, SOLUTION INTRAVENOUS PRN
Status: DISCONTINUED | OUTPATIENT
Start: 2023-07-31 | End: 2023-07-31 | Stop reason: SDUPTHER

## 2023-07-31 RX ORDER — ACETAMINOPHEN 325 MG/1
650 TABLET ORAL EVERY 6 HOURS PRN
Status: DISCONTINUED | OUTPATIENT
Start: 2023-07-31 | End: 2023-08-07 | Stop reason: HOSPADM

## 2023-07-31 RX ORDER — ONDANSETRON 2 MG/ML
4 INJECTION INTRAMUSCULAR; INTRAVENOUS EVERY 6 HOURS PRN
Status: DISCONTINUED | OUTPATIENT
Start: 2023-07-31 | End: 2023-08-03

## 2023-07-31 RX ORDER — ONDANSETRON 4 MG/1
4 TABLET, ORALLY DISINTEGRATING ORAL EVERY 8 HOURS PRN
Status: DISCONTINUED | OUTPATIENT
Start: 2023-07-31 | End: 2023-08-07 | Stop reason: HOSPADM

## 2023-07-31 RX ORDER — ENOXAPARIN SODIUM 100 MG/ML
40 INJECTION SUBCUTANEOUS DAILY
Status: DISCONTINUED | OUTPATIENT
Start: 2023-08-02 | End: 2023-08-01

## 2023-07-31 RX ORDER — MORPHINE SULFATE 2 MG/ML
2 INJECTION, SOLUTION INTRAMUSCULAR; INTRAVENOUS
Status: DISCONTINUED | OUTPATIENT
Start: 2023-07-31 | End: 2023-08-03

## 2023-07-31 RX ORDER — SODIUM CHLORIDE 0.9 % (FLUSH) 0.9 %
5-40 SYRINGE (ML) INJECTION EVERY 12 HOURS SCHEDULED
Status: DISCONTINUED | OUTPATIENT
Start: 2023-07-31 | End: 2023-07-31 | Stop reason: HOSPADM

## 2023-07-31 RX ORDER — NEOSTIGMINE METHYLSULFATE 1 MG/ML
INJECTION, SOLUTION INTRAVENOUS PRN
Status: DISCONTINUED | OUTPATIENT
Start: 2023-07-31 | End: 2023-07-31 | Stop reason: SDUPTHER

## 2023-07-31 RX ORDER — ATORVASTATIN CALCIUM 40 MG/1
40 TABLET, FILM COATED ORAL DAILY
Status: DISCONTINUED | OUTPATIENT
Start: 2023-07-31 | End: 2023-08-07 | Stop reason: HOSPADM

## 2023-07-31 RX ORDER — FLUTICASONE PROPIONATE 110 UG/1
1 AEROSOL, METERED RESPIRATORY (INHALATION)
Status: DISCONTINUED | OUTPATIENT
Start: 2023-07-31 | End: 2023-08-07 | Stop reason: HOSPADM

## 2023-07-31 RX ORDER — OXYCODONE HYDROCHLORIDE 5 MG/1
5 TABLET ORAL
Status: DISCONTINUED | OUTPATIENT
Start: 2023-07-31 | End: 2023-07-31 | Stop reason: HOSPADM

## 2023-07-31 RX ORDER — POLYETHYLENE GLYCOL 3350 17 G/17G
17 POWDER, FOR SOLUTION ORAL DAILY PRN
Status: DISCONTINUED | OUTPATIENT
Start: 2023-07-31 | End: 2023-08-07 | Stop reason: HOSPADM

## 2023-07-31 RX ORDER — ONDANSETRON 2 MG/ML
4 INJECTION INTRAMUSCULAR; INTRAVENOUS EVERY 6 HOURS PRN
Status: DISCONTINUED | OUTPATIENT
Start: 2023-07-31 | End: 2023-08-07 | Stop reason: HOSPADM

## 2023-07-31 RX ADMIN — SODIUM CHLORIDE, POTASSIUM CHLORIDE, SODIUM LACTATE AND CALCIUM CHLORIDE: 600; 310; 30; 20 INJECTION, SOLUTION INTRAVENOUS at 16:08

## 2023-07-31 RX ADMIN — HYDROMORPHONE HYDROCHLORIDE 0.5 MG: 2 INJECTION INTRAMUSCULAR; INTRAVENOUS; SUBCUTANEOUS at 17:48

## 2023-07-31 RX ADMIN — SODIUM CHLORIDE: 9 INJECTION, SOLUTION INTRAVENOUS at 22:17

## 2023-07-31 RX ADMIN — SODIUM CHLORIDE, PRESERVATIVE FREE 10 ML: 5 INJECTION INTRAVENOUS at 22:30

## 2023-07-31 RX ADMIN — FENTANYL CITRATE 50 MCG: 50 INJECTION, SOLUTION INTRAMUSCULAR; INTRAVENOUS at 17:32

## 2023-07-31 RX ADMIN — HYDROMORPHONE HYDROCHLORIDE 0.5 MG: 2 INJECTION INTRAMUSCULAR; INTRAVENOUS; SUBCUTANEOUS at 18:34

## 2023-07-31 RX ADMIN — GLYCOPYRROLATE 0.6 MG: 0.2 INJECTION INTRAMUSCULAR; INTRAVENOUS at 18:17

## 2023-07-31 RX ADMIN — Medication 4 MG: at 18:17

## 2023-07-31 RX ADMIN — Medication 2 G: at 17:15

## 2023-07-31 RX ADMIN — PHENYLEPHRINE HYDROCHLORIDE 100 MCG: 10 INJECTION INTRAVENOUS at 17:04

## 2023-07-31 RX ADMIN — FENTANYL CITRATE 50 MCG: 50 INJECTION, SOLUTION INTRAMUSCULAR; INTRAVENOUS at 17:04

## 2023-07-31 RX ADMIN — DEXAMETHASONE SODIUM PHOSPHATE 10 MG: 10 INJECTION INTRAMUSCULAR; INTRAVENOUS at 17:35

## 2023-07-31 RX ADMIN — Medication 200 MG: at 17:04

## 2023-07-31 RX ADMIN — ACETAMINOPHEN 650 MG: 325 TABLET ORAL at 22:16

## 2023-07-31 RX ADMIN — PROPOFOL 160 MG: 10 INJECTION, EMULSION INTRAVENOUS at 17:04

## 2023-07-31 RX ADMIN — ROCURONIUM BROMIDE 25 MG: 50 INJECTION, SOLUTION INTRAVENOUS at 17:19

## 2023-07-31 RX ADMIN — SODIUM CHLORIDE, SODIUM LACTATE, POTASSIUM CHLORIDE, AND CALCIUM CHLORIDE: 600; 310; 30; 20 INJECTION, SOLUTION INTRAVENOUS at 18:03

## 2023-07-31 RX ADMIN — ROCURONIUM BROMIDE 5 MG: 50 INJECTION, SOLUTION INTRAVENOUS at 17:04

## 2023-07-31 RX ADMIN — LIDOCAINE HYDROCHLORIDE 100 MG: 20 INJECTION, SOLUTION EPIDURAL; INFILTRATION; INTRACAUDAL; PERINEURAL at 17:04

## 2023-07-31 RX ADMIN — SODIUM CHLORIDE, SODIUM LACTATE, POTASSIUM CHLORIDE, AND CALCIUM CHLORIDE: 600; 310; 30; 20 INJECTION, SOLUTION INTRAVENOUS at 17:00

## 2023-07-31 RX ADMIN — ONDANSETRON 4 MG: 2 INJECTION INTRAMUSCULAR; INTRAVENOUS at 17:35

## 2023-07-31 ASSESSMENT — PAIN DESCRIPTION - LOCATION: LOCATION: LEG

## 2023-07-31 ASSESSMENT — PAIN DESCRIPTION - ORIENTATION: ORIENTATION: RIGHT

## 2023-07-31 ASSESSMENT — PAIN - FUNCTIONAL ASSESSMENT
PAIN_FUNCTIONAL_ASSESSMENT: PREVENTS OR INTERFERES SOME ACTIVE ACTIVITIES AND ADLS
PAIN_FUNCTIONAL_ASSESSMENT: 0-10
PAIN_FUNCTIONAL_ASSESSMENT: NONE - DENIES PAIN

## 2023-07-31 ASSESSMENT — ENCOUNTER SYMPTOMS
GASTROINTESTINAL NEGATIVE: 1
RESPIRATORY NEGATIVE: 1

## 2023-07-31 ASSESSMENT — PAIN DESCRIPTION - FREQUENCY: FREQUENCY: INTERMITTENT

## 2023-07-31 ASSESSMENT — LIFESTYLE VARIABLES
HOW MANY STANDARD DRINKS CONTAINING ALCOHOL DO YOU HAVE ON A TYPICAL DAY: 1 OR 2
HOW OFTEN DO YOU HAVE A DRINK CONTAINING ALCOHOL: 2-4 TIMES A MONTH

## 2023-07-31 ASSESSMENT — PAIN SCALES - GENERAL
PAINLEVEL_OUTOF10: 0
PAINLEVEL_OUTOF10: 2
PAINLEVEL_OUTOF10: 0

## 2023-07-31 ASSESSMENT — PAIN DESCRIPTION - DESCRIPTORS: DESCRIPTORS: ACHING

## 2023-07-31 ASSESSMENT — PAIN DESCRIPTION - PAIN TYPE: TYPE: SURGICAL PAIN

## 2023-07-31 ASSESSMENT — PAIN DESCRIPTION - ONSET: ONSET: GRADUAL

## 2023-07-31 NOTE — ED PROVIDER NOTES
Emergency Department Provider Note       PCP: Tito Briceno DO   Age: 60 y.o. Sex: male     DISPOSITION Admitted 07/31/2023 02:23:56 PM       ICD-10-CM    1. Closed fracture of distal end of right femur, unspecified fracture morphology, initial encounter [facility])  S72.401A           Medical Decision Making     Complexity of Problems Addressed:  1 or more acute illnesses that pose a threat to life or bodily function. Data Reviewed and Analyzed:  Category 1:   I independently ordered and reviewed each unique test.         Category 2:   I interpreted the X-rays nasty comminuted angulated distal femur fracture. Category 3: Discussion of management or test interpretation. Patient is 68-year-old male who presents with right knee pain after a fall. X-ray shows acute distal femur fracture that is comminuted displaced and angulated. Discussed with Ortho who recommended hospitalist admission. Hospitalist contacted. Patient will be admitted with plans for surgery. Preop labs and EKG ordered. The patient was admitted and I have discussed patient management with the admitting provider. The management of this patient was discussed with an external consultant. Risk of Complications and/or Morbidity of Patient Management:  Discussion with external consultants. History      Kari Ac is a 60 y.o. male who presents to the Emergency Department with chief complaint of    Chief Complaint   Patient presents with    Knee Injury      Patient is a 68-year-old male who presents with right knee pain. He comes in via EMS after a fall. He was walking his dog stepped off of a curb and fell directly onto his right knee. Abrasion over the knee, but no laceration to repair. Cannot move his right knee secondary to pain. Was given morphine in route which significantly improved his pain. Does not want any pain meds at this time. No pain if he does not move his leg.   Denies head injury or other injuries or

## 2023-07-31 NOTE — ACP (ADVANCE CARE PLANNING)
Advance Care Planning   Healthcare Decision Maker:    Primary Decision Maker: Kaushik Ellis - [phone]    Click here to complete Healthcare Decision Makers including selection of the Healthcare Decision Maker Relationship (ie \"Primary\").

## 2023-07-31 NOTE — ED TRIAGE NOTES
Patient stepped off curb while walking dog and fell onto right knee. Abrasions and swelling noted. Patient denies hitting heat or other injuries.

## 2023-07-31 NOTE — H&P
Hospitalist History and Physical   Admit Date:  2023 12:33 PM   Name:  Kings Hunter   Age:  60 y.o. Sex:  male  :  1960   MRN:  [id number]   [facility]    Presenting/Chief Complaint: Knee Injury     Reason(s) for Admission: Closed comminuted intra-articular fracture of distal femur, right, initial encounter [facility]) [S78.966K]     History of Present Illness:   Kings Hunter is a 60 y.o. male with medical history of RICCI, on CPAP, mild intermittent asthma, hypercholesterolemia, anxiety and vitamin D deficiency presented to ED after a fall. Patient reports he stepped off a curb while walking dog and fell onto his right knee. Complaining of pain, swelling and abrasion on right knee. Denies hitting his head. Not on blood thinner medicine. Chest pain, palpitation, nausea, vomiting or abdominal pain. Complaining of some lightheadedness. In the ED patient was vitally stable. Labs unremarkable. X-ray showed comminuted fracture distal right femur with multiple butterfly fragments. Hospitalist consulted for admission. Assessment & Plan:     Closed comminuted fracture of distal femur, right:  Ortho consulted, plan for OR this evening  N.p.o. for now  Pain control with IV morphine as needed  PT/OT as per Ortho  DVT prophylaxis as per Ortho    Hyperlipidemia:  Continue atorvastatin    Mild intermittent asthma:  Continue Flovent  Nebs treatment as needed    Anxiety/depression:  Continue paroxetine    RICCI:  Continue CPAP at night    PT/OT evals and PPD needed/ordered? Yes  Diet: Diet NPO  VTE prophylaxis: Defer to consulting surgeon or physician  Code status: Full Code    Hospital Problems:  Principal Problem:    Closed comminuted intra-articular fracture of distal femur, right, initial encounter ([address])  Active Problems:    Sleep apnea    Mild intermittent asthma    Anxiety    High cholesterol  Resolved Problems:    * No resolved hospital problems.  *       Past History:     Past Medical

## 2023-07-31 NOTE — ED NOTES
Received 5mg of morphine per EMS with relief of pain.   Patient states no pain when not moving,      Lou Dutton RN  07/31/23 2024

## 2023-07-31 NOTE — INTERVAL H&P NOTE
Update History & Physical    The Patient's History and Physical of 7/31/2023 was reviewed with the patient and I examined the patient. There was no change. The surgical site was confirmed by the patient and me. Plan:  The risk, benefits, expected outcome, and alternative to the recommended procedure have been discussed with the patient. Patient understands and wants to proceed with open treatment of right distal femur fracture with retrograde intramedullary nail fixation.     Electronically signed by Pete May MD on 7/31/2023 at 4:09 PM

## 2023-07-31 NOTE — CARE COORDINATION
Patient recently retired and move to [place] from [facility]. He lives with spouse in a first floor apartment. 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 Spouse does have accessible car and can drive. She has a slide in shower chair over tub that patient can also use if needed. CM will follow patient   07/31/23 9304   Service Assessment   Patient Orientation Alert and Oriented   Cognition Alert   History Provided By Patient   Primary Caregiver Self   Accompanied By/Relationship spouse   Support Systems Spouse/Significant Other   Patient's Healthcare Decision Maker is: Legal Next of Kin   PCP Verified by CM Yes   Prior Functional Level Independent in ADLs/IADLs   Current Functional Level Independent in ADLs/IADLs   Can patient return to prior living arrangement Yes   Ability to make needs known: Good   Family able to assist with home care needs: No   Would you like for me to discuss the discharge plan with any other family members/significant others, and if so, who? No   Financial Resources Medicare   Community Resources ECF/Home Care   Social/Functional History   Lives With Spouse   Type of Home Apartment   Home Layout One level   Home Access Level entry   Bathroom Shower/Tub Tub/Shower unit  (slide shower chair)   [address] Responsibilities Yes   Ambulation Assistance Independent   Transfer Assistance Independent   Active  Yes   Mode of Transportation Car   Occupation Retired   Discharge Planning   Type of [place] Prior To Admission None   DME Ordered?  No   Potential Assistance Purchasing Medications No   Type of Home Care Services None   Patient expects to be discharged to: Apartment   One/Two Story

## 2023-07-31 NOTE — OP NOTE
Operative Report    Patient: Juan Pablo Felder MRN: [id number]  SSN: [id number]    YOB: 1960  Age: 60 y.o. Sex: male       Date of Surgery: July 31, 2023     History:  Juan Pablo Felder is a 60 y.o. male who fell and injured his right knee. He was seen in the emergency room and found to have a closed very comminuted right distal femur fracture. On his original plain films it was difficult to tell if his articular surface was intact so I did get a CT scan that showed he does have intercondylar extension so he has comminution in the metaphyseal area as well as a fracture line into the articular surface of the distal femur. I did have a chance to talk to him about the need for operative intervention. I explained that the plan will be to proceed with screw fixation of the condyles and then retrograde intramedullary nail fixation of his intercondylar supracondylar distal femur fracture. He seemed to feel comfortable consenting. I talked to the patient and/or their representative and explained the exact nature the procedure. I also went through a detailed list of the material risks associated with  the procedure which included risk of bleeding, infection, injury to nearby structures, worsening the situation, as well as the risks associate with anesthesia and finally death. Also talked with him regarding the benefits and alternatives to the procedure.     Preoperative Diagnosis: Closed displaced right supracondylar intercondylar distal femur fracture    Postoperative Diagnosis:   Closed displaced right supracondylar intercondylar distal femur fracture      Surgeon(s) and Role:     * Mario Marr MD - Primary    Anesthesia: General     Procedure: Open reduction internal fixation of right supracondylar distal femur fracture with intercondylar extension    Procedure in Detail: After the successful duction of general anesthetic the right lower extremity was prepped and draped in usual sterile

## 2023-07-31 NOTE — ED NOTES
TRANSFER - OUT REPORT:    Verbal report given to MANISHA Toscano on [employer]  being transferred to Pre-Op for routine progression of patient care       Report consisted of patient's Situation, Background, Assessment and   Recommendations(SBAR). Information from the following report(s) ED Encounter Summary was reviewed with the receiving nurse. [place] Fall Assessment:    Presents to emergency department  because of falls (Syncope, seizure, or loss of consciousness): Yes  Age > 79: No  Altered Mental Status, Intoxication with alcohol or substance confusion (Disorientation, impaired judgment, poor safety awaremess, or inability to follow instructions): No  Impaired Mobility: Ambulates or transfers with assistive devices or assistance; Unable to ambulate or transer.: No  Nursing Judgement: No          Lines:   Peripheral IV 07/31/23 Left Antecubital (Active)   Site Assessment Clean, dry & intact 07/31/23 1240        Opportunity for questions and clarification was provided.       Patient transported with:  Denver Rebel, RN  07/31/23 9772

## 2023-07-31 NOTE — ANESTHESIA PROCEDURE NOTES
Airway  Date/Time: 7/31/2023 5:06 PM  Urgency: elective    Difficult airway    General Information and Staff    Patient location during procedure: OR  Anesthesiologist: Arnoldo Yanes MD  Resident/CRNA: JANE Mcgee - CRNA  Performed: resident/CRNA     Indications and Patient Condition  Indications for airway management: anesthesia  Spontaneous ventilation: present  Sedation level: deep  Preoxygenated: yes  Patient position: sniffing  MILS not maintained throughout  Mask difficulty assessment: not attempted    Final Airway Details  Final airway type: endotracheal airway      Successful airway: ETT  Cuffed: yes   Successful intubation technique: video laryngoscopy  Facilitating devices/methods: intubating stylet  Endotracheal tube insertion site: oral  Blade size: #3  ETT size (mm): 8.0  Cormack-Lehane Classification: grade I - full view of glottis  Placement verified by: chest auscultation and capnometry   Inital cuff pressure (cm H2O): 8  Measured from: teeth  ETT to teeth (cm): 24  Number of attempts at approach: 1  Ventilation between attempts: bag mask  Number of other approaches attempted: 0    no

## 2023-08-01 ENCOUNTER — APPOINTMENT (OUTPATIENT)
Dept: GENERAL RADIOLOGY | Age: 63
DRG: 481 | End: 2023-08-01
Payer: COMMERCIAL

## 2023-08-01 DIAGNOSIS — S99.192A CLOSED FRACTURE OF BASE OF FIFTH METATARSAL BONE OF LEFT FOOT AT METAPHYSEAL-DIAPHYSEAL JUNCTION, INITIAL ENCOUNTER: Primary | ICD-10-CM

## 2023-08-01 PROBLEM — R33.9 URINARY RETENTION: Status: ACTIVE | Noted: 2023-08-01

## 2023-08-01 PROBLEM — N17.9 AKI (ACUTE KIDNEY INJURY) (HCC): Status: ACTIVE | Noted: 2023-08-01

## 2023-08-01 PROBLEM — I47.10 SVT (SUPRAVENTRICULAR TACHYCARDIA): Status: ACTIVE | Noted: 2023-08-01

## 2023-08-01 PROBLEM — D50.0 BLOOD LOSS ANEMIA: Status: ACTIVE | Noted: 2023-08-01

## 2023-08-01 PROBLEM — I47.1 SVT (SUPRAVENTRICULAR TACHYCARDIA) (HCC): Status: ACTIVE | Noted: 2023-08-01

## 2023-08-01 LAB
ANION GAP SERPL CALC-SCNC: 15 MMOL/L (ref 2–11)
BASE DEFICIT BLD-SCNC: 8.1 MMOL/L
BASOPHILS # BLD: 0 K/UL (ref 0–0.2)
BASOPHILS NFR BLD: 0 % (ref 0–2)
BUN SERPL-MCNC: 24 MG/DL (ref 8–23)
CA-I BLD-MCNC: 1.11 MMOL/L (ref 1.12–1.32)
CALCIUM SERPL-MCNC: 8.1 MG/DL (ref 8.3–10.4)
CHLORIDE SERPL-SCNC: 103 MMOL/L (ref 101–110)
CO2 BLD-SCNC: 17 MMOL/L (ref 13–23)
CO2 SERPL-SCNC: 20 MMOL/L (ref 21–32)
CREAT SERPL-MCNC: 2 MG/DL (ref 0.8–1.5)
CREAT UR-MCNC: 20 MG/DL
DIFFERENTIAL METHOD BLD: ABNORMAL
EKG ATRIAL RATE: 135 BPM
EKG DIAGNOSIS: NORMAL
EKG P AXIS: 80 DEGREES
EKG P-R INTERVAL: 130 MS
EKG Q-T INTERVAL: 316 MS
EKG QRS DURATION: 82 MS
EKG QTC CALCULATION (BAZETT): 474 MS
EKG R AXIS: 51 DEGREES
EKG T AXIS: 71 DEGREES
EKG VENTRICULAR RATE: 135 BPM
EOSINOPHIL # BLD: 0 K/UL (ref 0–0.8)
EOSINOPHIL NFR BLD: 0 % (ref 0.5–7.8)
ERYTHROCYTE [DISTWIDTH] IN BLOOD BY AUTOMATED COUNT: 14.6 % (ref 11.9–14.6)
FERRITIN SERPL-MCNC: 11 NG/ML (ref 8–388)
GLUCOSE BLD STRIP.AUTO-MCNC: 144 MG/DL (ref 65–100)
GLUCOSE BLD STRIP.AUTO-MCNC: 149 MG/DL (ref 65–100)
GLUCOSE BLD STRIP.AUTO-MCNC: 151 MG/DL (ref 65–100)
GLUCOSE BLD STRIP.AUTO-MCNC: 272 MG/DL (ref 65–100)
GLUCOSE SERPL-MCNC: 285 MG/DL (ref 65–100)
HCO3 BLD-SCNC: 17.4 MMOL/L (ref 22–26)
HCT VFR BLD AUTO: 28.1 % (ref 41.1–50.3)
HGB BLD-MCNC: 8.5 G/DL (ref 13.6–17.2)
IMM GRANULOCYTES # BLD AUTO: 0.1 K/UL (ref 0–0.5)
IMM GRANULOCYTES NFR BLD AUTO: 1 % (ref 0–5)
IRON SATN MFR SERPL: 8 %
IRON SERPL-MCNC: 34 UG/DL (ref 35–150)
LYMPHOCYTES # BLD: 1.2 K/UL (ref 0.5–4.6)
LYMPHOCYTES NFR BLD: 9 % (ref 13–44)
MCH RBC QN AUTO: 29.5 PG (ref 26.1–32.9)
MCHC RBC AUTO-ENTMCNC: 30.2 G/DL (ref 31.4–35)
MCV RBC AUTO: 97.6 FL (ref 82–102)
MONOCYTES # BLD: 1.1 K/UL (ref 0.1–1.3)
MONOCYTES NFR BLD: 8 % (ref 4–12)
NEUTS SEG # BLD: 11.2 K/UL (ref 1.7–8.2)
NEUTS SEG NFR BLD: 82 % (ref 43–78)
NRBC # BLD: 0 K/UL (ref 0–0.2)
PCO2 BLD: 34.3 MMHG (ref 35–45)
PH BLD: 7.31 (ref 7.35–7.45)
PLATELET # BLD AUTO: 242 K/UL (ref 150–450)
PMV BLD AUTO: 10.3 FL (ref 9.4–12.3)
PO2 BLD: 70 MMHG (ref 75–100)
POTASSIUM BLD-SCNC: 4 MMOL/L (ref 3.5–5.1)
POTASSIUM SERPL-SCNC: 4.2 MMOL/L (ref 3.5–5.1)
RBC # BLD AUTO: 2.88 M/UL (ref 4.23–5.6)
SAO2 % BLD: 92 %
SERVICE CMNT-IMP: ABNORMAL
SODIUM BLD-SCNC: 129 MMOL/L (ref 136–145)
SODIUM SERPL-SCNC: 138 MMOL/L (ref 133–143)
SODIUM UR-SCNC: 14 MMOL/L
SPECIMEN SITE: ABNORMAL
TIBC SERPL-MCNC: 406 UG/DL (ref 250–450)
WBC # BLD AUTO: 13.6 K/UL (ref 4.3–11.1)

## 2023-08-01 PROCEDURE — 93005 ELECTROCARDIOGRAM TRACING: CPT | Performed by: HOSPITALIST

## 2023-08-01 PROCEDURE — 2700000000 HC OXYGEN THERAPY PER DAY

## 2023-08-01 PROCEDURE — 6370000000 HC RX 637 (ALT 250 FOR IP): Performed by: FAMILY MEDICINE

## 2023-08-01 PROCEDURE — 6360000002 HC RX W HCPCS: Performed by: ORTHOPAEDIC SURGERY

## 2023-08-01 PROCEDURE — 2500000003 HC RX 250 WO HCPCS: Performed by: HOSPITALIST

## 2023-08-01 PROCEDURE — 82728 ASSAY OF FERRITIN: CPT

## 2023-08-01 PROCEDURE — 94640 AIRWAY INHALATION TREATMENT: CPT

## 2023-08-01 PROCEDURE — 1100000003 HC PRIVATE W/ TELEMETRY

## 2023-08-01 PROCEDURE — 36415 COLL VENOUS BLD VENIPUNCTURE: CPT

## 2023-08-01 PROCEDURE — 84132 ASSAY OF SERUM POTASSIUM: CPT

## 2023-08-01 PROCEDURE — 71045 X-RAY EXAM CHEST 1 VIEW: CPT

## 2023-08-01 PROCEDURE — 73630 X-RAY EXAM OF FOOT: CPT

## 2023-08-01 PROCEDURE — 6360000002 HC RX W HCPCS: Performed by: FAMILY MEDICINE

## 2023-08-01 PROCEDURE — 6360000002 HC RX W HCPCS: Performed by: PHYSICIAN ASSISTANT

## 2023-08-01 PROCEDURE — 94660 CPAP INITIATION&MGMT: CPT

## 2023-08-01 PROCEDURE — 81001 URINALYSIS AUTO W/SCOPE: CPT

## 2023-08-01 PROCEDURE — 97162 PT EVAL MOD COMPLEX 30 MIN: CPT

## 2023-08-01 PROCEDURE — 82947 ASSAY GLUCOSE BLOOD QUANT: CPT

## 2023-08-01 PROCEDURE — 85025 COMPLETE CBC W/AUTO DIFF WBC: CPT

## 2023-08-01 PROCEDURE — 84295 ASSAY OF SERUM SODIUM: CPT

## 2023-08-01 PROCEDURE — 36600 WITHDRAWAL OF ARTERIAL BLOOD: CPT

## 2023-08-01 PROCEDURE — 82962 GLUCOSE BLOOD TEST: CPT

## 2023-08-01 PROCEDURE — 2580000003 HC RX 258: Performed by: FAMILY MEDICINE

## 2023-08-01 PROCEDURE — 83550 IRON BINDING TEST: CPT

## 2023-08-01 PROCEDURE — 5A09457 ASSISTANCE WITH RESPIRATORY VENTILATION, 24-96 CONSECUTIVE HOURS, CONTINUOUS POSITIVE AIRWAY PRESSURE: ICD-10-PCS | Performed by: FAMILY MEDICINE

## 2023-08-01 PROCEDURE — 6370000000 HC RX 637 (ALT 250 FOR IP): Performed by: ORTHOPAEDIC SURGERY

## 2023-08-01 PROCEDURE — 80048 BASIC METABOLIC PNL TOTAL CA: CPT

## 2023-08-01 PROCEDURE — 2500000003 HC RX 250 WO HCPCS: Performed by: FAMILY MEDICINE

## 2023-08-01 PROCEDURE — 82803 BLOOD GASES ANY COMBINATION: CPT

## 2023-08-01 PROCEDURE — 51702 INSERT TEMP BLADDER CATH: CPT

## 2023-08-01 PROCEDURE — 97530 THERAPEUTIC ACTIVITIES: CPT

## 2023-08-01 PROCEDURE — 73610 X-RAY EXAM OF ANKLE: CPT

## 2023-08-01 PROCEDURE — 82330 ASSAY OF CALCIUM: CPT

## 2023-08-01 PROCEDURE — 2580000003 HC RX 258: Performed by: ORTHOPAEDIC SURGERY

## 2023-08-01 PROCEDURE — 6370000000 HC RX 637 (ALT 250 FOR IP): Performed by: HOSPITALIST

## 2023-08-01 PROCEDURE — 84300 ASSAY OF URINE SODIUM: CPT

## 2023-08-01 PROCEDURE — 6360000002 HC RX W HCPCS: Performed by: HOSPITALIST

## 2023-08-01 PROCEDURE — 83540 ASSAY OF IRON: CPT

## 2023-08-01 PROCEDURE — 82570 ASSAY OF URINE CREATININE: CPT

## 2023-08-01 PROCEDURE — 94760 N-INVAS EAR/PLS OXIMETRY 1: CPT

## 2023-08-01 PROCEDURE — 97165 OT EVAL LOW COMPLEX 30 MIN: CPT

## 2023-08-01 PROCEDURE — 93010 ELECTROCARDIOGRAM REPORT: CPT | Performed by: INTERNAL MEDICINE

## 2023-08-01 PROCEDURE — 97112 NEUROMUSCULAR REEDUCATION: CPT

## 2023-08-01 RX ORDER — INSULIN LISPRO 100 [IU]/ML
0-4 INJECTION, SOLUTION INTRAVENOUS; SUBCUTANEOUS NIGHTLY
Status: DISCONTINUED | OUTPATIENT
Start: 2023-08-01 | End: 2023-08-05

## 2023-08-01 RX ORDER — ADENOSINE 3 MG/ML
6 INJECTION, SOLUTION INTRAVENOUS ONCE
Status: COMPLETED | OUTPATIENT
Start: 2023-08-01 | End: 2023-08-01

## 2023-08-01 RX ORDER — INSULIN LISPRO 100 [IU]/ML
0-8 INJECTION, SOLUTION INTRAVENOUS; SUBCUTANEOUS
Status: DISCONTINUED | OUTPATIENT
Start: 2023-08-01 | End: 2023-08-05

## 2023-08-01 RX ORDER — CALCIUM CARBONATE 500 MG/1
500 TABLET, CHEWABLE ORAL 3 TIMES DAILY PRN
Status: DISCONTINUED | OUTPATIENT
Start: 2023-08-01 | End: 2023-08-07 | Stop reason: HOSPADM

## 2023-08-01 RX ORDER — CLONAZEPAM 0.5 MG/1
0.5 TABLET ORAL NIGHTLY
Status: DISCONTINUED | OUTPATIENT
Start: 2023-08-01 | End: 2023-08-07 | Stop reason: HOSPADM

## 2023-08-01 RX ORDER — DEXTROSE MONOHYDRATE 100 MG/ML
INJECTION, SOLUTION INTRAVENOUS CONTINUOUS PRN
Status: DISCONTINUED | OUTPATIENT
Start: 2023-08-01 | End: 2023-08-07 | Stop reason: HOSPADM

## 2023-08-01 RX ORDER — ENOXAPARIN SODIUM 100 MG/ML
40 INJECTION SUBCUTANEOUS DAILY
Status: DISCONTINUED | OUTPATIENT
Start: 2023-08-01 | End: 2023-08-07 | Stop reason: HOSPADM

## 2023-08-01 RX ORDER — METOPROLOL TARTRATE 5 MG/5ML
5 INJECTION INTRAVENOUS
Status: COMPLETED | OUTPATIENT
Start: 2023-08-01 | End: 2023-08-01

## 2023-08-01 RX ADMIN — INSULIN LISPRO 4 UNITS: 100 INJECTION, SOLUTION INTRAVENOUS; SUBCUTANEOUS at 08:25

## 2023-08-01 RX ADMIN — FLUTICASONE PROPIONATE 1 PUFF: 110 AEROSOL, METERED RESPIRATORY (INHALATION) at 19:24

## 2023-08-01 RX ADMIN — SODIUM CHLORIDE, PRESERVATIVE FREE 10 ML: 5 INJECTION INTRAVENOUS at 08:20

## 2023-08-01 RX ADMIN — CEFAZOLIN SODIUM 2000 MG: 100 INJECTION, POWDER, LYOPHILIZED, FOR SOLUTION INTRAVENOUS at 08:20

## 2023-08-01 RX ADMIN — ENOXAPARIN SODIUM 40 MG: 100 INJECTION SUBCUTANEOUS at 09:29

## 2023-08-01 RX ADMIN — ATORVASTATIN CALCIUM 40 MG: 40 TABLET, FILM COATED ORAL at 08:20

## 2023-08-01 RX ADMIN — CEFAZOLIN SODIUM 2000 MG: 100 INJECTION, POWDER, LYOPHILIZED, FOR SOLUTION INTRAVENOUS at 00:30

## 2023-08-01 RX ADMIN — SODIUM CHLORIDE 125 MG: 9 INJECTION, SOLUTION INTRAVENOUS at 12:52

## 2023-08-01 RX ADMIN — SODIUM CHLORIDE, PRESERVATIVE FREE 5 ML: 5 INJECTION INTRAVENOUS at 20:43

## 2023-08-01 RX ADMIN — CALCIUM CARBONATE 500 MG: 500 TABLET, CHEWABLE ORAL at 00:24

## 2023-08-01 RX ADMIN — METOPROLOL TARTRATE 5 MG: 5 INJECTION INTRAVENOUS at 04:47

## 2023-08-01 RX ADMIN — SODIUM CHLORIDE, PRESERVATIVE FREE 10 ML: 5 INJECTION INTRAVENOUS at 08:21

## 2023-08-01 RX ADMIN — SODIUM CHLORIDE: 9 INJECTION, SOLUTION INTRAVENOUS at 21:31

## 2023-08-01 RX ADMIN — ADENOSINE 6 MG: 3 INJECTION, SOLUTION INTRAVENOUS at 04:41

## 2023-08-01 RX ADMIN — BENZOCAINE AND MENTHOL 1 LOZENGE: 15; 2.6 LOZENGE ORAL at 19:13

## 2023-08-01 RX ADMIN — CLONAZEPAM 0.5 MG: 0.5 TABLET ORAL at 20:43

## 2023-08-01 RX ADMIN — TUBERCULIN PURIFIED PROTEIN DERIVATIVE 5 UNITS: 5 INJECTION, SOLUTION INTRADERMAL at 11:11

## 2023-08-01 RX ADMIN — PAROXETINE HYDROCHLORIDE 20 MG: 20 TABLET, FILM COATED ORAL at 08:20

## 2023-08-01 ASSESSMENT — PAIN SCALES - GENERAL
PAINLEVEL_OUTOF10: 0
PAINLEVEL_OUTOF10: 0

## 2023-08-01 NOTE — ICUWATCH
RRT Clinical Rounding Nurse Update    Vitals:    08/01/23 0534 08/01/23 0538 08/01/23 0627 08/01/23 0820   BP: 85/63 92/60 100/72 116/71   Pulse: 90 88 89 90   Resp:       Temp:       TempSrc:    Oral   SpO2:    98%   Weight:       Height:            DETERIORATION INDEX SCORE: 39    ASSESSMENT:  Previous outreach assessment was reviewed. There have been no significant changes since previous assessment. PLAN:  Will follow per RRT Clinical Rounding Program protocol.     Jadiel Blanco RN  Downtown: [address] [place] [address] Ave: [phone]

## 2023-08-01 NOTE — CARE COORDINATION
Therapy evals complete with the recommendation for acute inpatient rehab at [place]. Physiatrist consult placed and CM alerted their admissions team to the referral.  CM following.

## 2023-08-01 NOTE — ICUWATCH
RRT Clinical Rounding Nurse Progress Report      SUBJECTIVE: Patient assessed secondary to recent rapid response. Vitals:    08/01/23 0534 08/01/23 0538 08/01/23 0627 08/01/23 0820   BP: 85/63 92/60 100/72 116/71   Pulse: 90 88 89 90   Resp:       Temp:       TempSrc:    Oral   SpO2:    98%   Weight:       Height:            DETERIORATION INDEX SCORE: 39    ASSESSMENT:  Pt awake in bed, oriented x4. On 3L NC, lung fields clear bilaterally. Ecchymoses and edema noted to RLE. Pt states no discomfort with chest or breathing. Discussed case with RN, pertinent labs, images reviewed. PLAN:  Will follow per RRT Clinical Rounding Program protocol.     Lissa Swain RN  Downtown: [address] [facility] [address] [facility] [address]: [phone]

## 2023-08-01 NOTE — ICUWATCH
Rapid Response Team Note      Subjective: Responded to Rapid Response secondary to SVT. Summary: RR called after pt became lightheaded, diaphoretic. Pt placed on lifepack- SVT 170s.  to bedside- 6mg adenosine given per MD. HR  improving to ST 120s. 5mg IV metoprolol given per MD with HR improving to mid 80s. Pt stating he feels better post-medication. Scott catheter placed per MD.      See Rapid Response/Code Blue Narrator for full documentation    Outcome: Patient to remain in current location. Will follow-up per Critical Care Clinical Rounding protocol.     Joan Garcia RN  Downtown: [address] [facility] [address]: [phone]

## 2023-08-01 NOTE — PROGRESS NOTES
Per VO Joey Olsen   DME order was placed for a Short Walker boot that was placed on the Pt at the hospital by Joey Olsen.  [facility]

## 2023-08-02 ENCOUNTER — APPOINTMENT (OUTPATIENT)
Dept: NON INVASIVE DIAGNOSTICS | Age: 63
DRG: 481 | End: 2023-08-02
Attending: FAMILY MEDICINE
Payer: COMMERCIAL

## 2023-08-02 PROBLEM — S72.401A CLOSED FRACTURE OF RIGHT DISTAL FEMUR (HCC): Status: ACTIVE | Noted: 2023-08-02

## 2023-08-02 LAB
ANION GAP SERPL CALC-SCNC: 3 MMOL/L (ref 2–11)
APPEARANCE UR: CLEAR
BACTERIA URNS QL MICRO: NEGATIVE /HPF
BASOPHILS # BLD: 0 K/UL (ref 0–0.2)
BASOPHILS NFR BLD: 0 % (ref 0–2)
BILIRUB UR QL: NEGATIVE
BUN SERPL-MCNC: 20 MG/DL (ref 8–23)
CALCIUM SERPL-MCNC: 8.1 MG/DL (ref 8.3–10.4)
CASTS URNS QL MICRO: ABNORMAL /LPF
CHLORIDE SERPL-SCNC: 108 MMOL/L (ref 101–110)
CO2 SERPL-SCNC: 26 MMOL/L (ref 21–32)
COLOR UR: ABNORMAL
CREAT SERPL-MCNC: 1 MG/DL (ref 0.8–1.5)
DIFFERENTIAL METHOD BLD: ABNORMAL
ECHO AO ASC DIAM: 2.7 CM
ECHO AO ASCENDING AORTA INDEX: 1.45 CM/M2
ECHO AO ROOT DIAM: 3.5 CM
ECHO AO ROOT INDEX: 1.88 CM/M2
ECHO AV AREA PEAK VELOCITY: 2.7 CM2
ECHO AV AREA VTI: 2.6 CM2
ECHO AV AREA/BSA PEAK VELOCITY: 1.5 CM2/M2
ECHO AV AREA/BSA VTI: 1.4 CM2/M2
ECHO AV MEAN GRADIENT: 14 MMHG
ECHO AV MEAN VELOCITY: 1.8 M/S
ECHO AV PEAK GRADIENT: 18 MMHG
ECHO AV PEAK VELOCITY: 2.2 M/S
ECHO AV VELOCITY RATIO: 0.95
ECHO AV VTI: 34.3 CM
ECHO BSA: 1.87 M2
ECHO EST RA PRESSURE: 5 MMHG
ECHO LA AREA 2C: 11.8 CM2
ECHO LA AREA 4C: 14.9 CM2
ECHO LA DIAMETER INDEX: 2.04 CM/M2
ECHO LA DIAMETER: 3.8 CM
ECHO LA MAJOR AXIS: 5 CM
ECHO LA MINOR AXIS: 4.3 CM
ECHO LA TO AORTIC ROOT RATIO: 1.09
ECHO LA VOL 2C: 27 ML (ref 18–58)
ECHO LA VOL 4C: 36 ML (ref 18–58)
ECHO LA VOL BP: 34 ML (ref 18–58)
ECHO LA VOL/BSA BIPLANE: 18 ML/M2 (ref 16–34)
ECHO LA VOLUME INDEX A2C: 15 ML/M2 (ref 16–34)
ECHO LA VOLUME INDEX A4C: 19 ML/M2 (ref 16–34)
ECHO LV E' LATERAL VELOCITY: 12 CM/S
ECHO LV E' SEPTAL VELOCITY: 8 CM/S
ECHO LV EDV A2C: 70 ML
ECHO LV EDV A4C: 91 ML
ECHO LV EDV INDEX A4C: 49 ML/M2
ECHO LV EDV NDEX A2C: 38 ML/M2
ECHO LV EJECTION FRACTION A2C: 59 %
ECHO LV EJECTION FRACTION A4C: 64 %
ECHO LV EJECTION FRACTION BIPLANE: 62 % (ref 55–100)
ECHO LV ESV A2C: 29 ML
ECHO LV ESV A4C: 33 ML
ECHO LV ESV INDEX A2C: 16 ML/M2
ECHO LV ESV INDEX A4C: 18 ML/M2
ECHO LV FRACTIONAL SHORTENING: 44 % (ref 28–44)
ECHO LV INTERNAL DIMENSION DIASTOLE INDEX: 2.1 CM/M2
ECHO LV INTERNAL DIMENSION DIASTOLIC: 3.9 CM (ref 4.2–5.9)
ECHO LV INTERNAL DIMENSION SYSTOLIC INDEX: 1.18 CM/M2
ECHO LV INTERNAL DIMENSION SYSTOLIC: 2.2 CM
ECHO LV IVSD: 1.4 CM (ref 0.6–1)
ECHO LV MASS 2D: 140.1 G (ref 88–224)
ECHO LV MASS INDEX 2D: 75.3 G/M2 (ref 49–115)
ECHO LV POSTERIOR WALL DIASTOLIC: 0.8 CM (ref 0.6–1)
ECHO LV RELATIVE WALL THICKNESS RATIO: 0.41
ECHO LVOT AREA: 2.8 CM2
ECHO LVOT AV VTI INDEX: 0.92
ECHO LVOT DIAM: 1.9 CM
ECHO LVOT MEAN GRADIENT: 10 MMHG
ECHO LVOT PEAK GRADIENT: 17 MMHG
ECHO LVOT PEAK VELOCITY: 2.1 M/S
ECHO LVOT STROKE VOLUME INDEX: 48.3 ML/M2
ECHO LVOT SV: 89.8 ML
ECHO LVOT VTI: 31.7 CM
ECHO MV A VELOCITY: 1.58 M/S
ECHO MV AREA VTI: 3.5 CM2
ECHO MV E DECELERATION TIME (DT): 172 MS
ECHO MV E VELOCITY: 0.79 M/S
ECHO MV E/A RATIO: 0.5
ECHO MV E/E' LATERAL: 6.58
ECHO MV E/E' RATIO (AVERAGED): 8.23
ECHO MV E/E' SEPTAL: 9.88
ECHO MV LVOT VTI INDEX: 0.81
ECHO MV MAX VELOCITY: 1.6 M/S
ECHO MV MEAN GRADIENT: 6 MMHG
ECHO MV MEAN VELOCITY: 1.1 M/S
ECHO MV PEAK GRADIENT: 11 MMHG
ECHO MV VTI: 25.7 CM
ECHO PV ACCELERATION TIME (AT): 87 MS
ECHO PV MAX VELOCITY: 1.8 M/S
ECHO PV PEAK GRADIENT: 12 MMHG
ECHO RV BASAL DIMENSION: 2.1 CM
ECHO RV FREE WALL PEAK S': 25 CM/S
ECHO RV TAPSE: 2.3 CM (ref 1.7–?)
EOSINOPHIL # BLD: 0 K/UL (ref 0–0.8)
EOSINOPHIL NFR BLD: 0 % (ref 0.5–7.8)
EPI CELLS #/AREA URNS HPF: ABNORMAL /HPF
ERYTHROCYTE [DISTWIDTH] IN BLOOD BY AUTOMATED COUNT: 15.6 % (ref 11.9–14.6)
GLUCOSE BLD STRIP.AUTO-MCNC: 131 MG/DL (ref 65–100)
GLUCOSE BLD STRIP.AUTO-MCNC: 131 MG/DL (ref 65–100)
GLUCOSE BLD STRIP.AUTO-MCNC: 136 MG/DL (ref 65–100)
GLUCOSE BLD STRIP.AUTO-MCNC: 160 MG/DL (ref 65–100)
GLUCOSE SERPL-MCNC: 170 MG/DL (ref 65–100)
GLUCOSE UR STRIP.AUTO-MCNC: NEGATIVE MG/DL
HCT VFR BLD AUTO: 21.4 % (ref 41.1–50.3)
HCT VFR BLD AUTO: 25.3 % (ref 41.1–50.3)
HGB BLD-MCNC: 6.6 G/DL (ref 13.6–17.2)
HGB BLD-MCNC: 8 G/DL (ref 13.6–17.2)
HGB UR QL STRIP: ABNORMAL
HISTORY CHECK: NORMAL
IMM GRANULOCYTES # BLD AUTO: 0.3 K/UL (ref 0–0.5)
IMM GRANULOCYTES NFR BLD AUTO: 1 % (ref 0–5)
KETONES UR QL STRIP.AUTO: NEGATIVE MG/DL
LEUKOCYTE ESTERASE UR QL STRIP.AUTO: NEGATIVE
LYMPHOCYTES # BLD: 2 K/UL (ref 0.5–4.6)
LYMPHOCYTES NFR BLD: 11 % (ref 13–44)
MCH RBC QN AUTO: 29.6 PG (ref 26.1–32.9)
MCHC RBC AUTO-ENTMCNC: 30.8 G/DL (ref 31.4–35)
MCV RBC AUTO: 96 FL (ref 82–102)
MM INDURATION, POC: 0 MM (ref 0–5)
MONOCYTES # BLD: 1.7 K/UL (ref 0.1–1.3)
MONOCYTES NFR BLD: 9 % (ref 4–12)
MUCOUS THREADS URNS QL MICRO: 0 /LPF
NEUTS SEG # BLD: 14.7 K/UL (ref 1.7–8.2)
NEUTS SEG NFR BLD: 79 % (ref 43–78)
NITRITE UR QL STRIP.AUTO: NEGATIVE
NRBC # BLD: 0 K/UL (ref 0–0.2)
PH UR STRIP: 5.5 (ref 5–9)
PLATELET # BLD AUTO: 186 K/UL (ref 150–450)
PMV BLD AUTO: 10.8 FL (ref 9.4–12.3)
POTASSIUM SERPL-SCNC: 4.1 MMOL/L (ref 3.5–5.1)
PPD, POC: NEGATIVE
PROCALCITONIN SERPL-MCNC: 0.38 NG/ML (ref 0–0.49)
PROT UR STRIP-MCNC: NEGATIVE MG/DL
RBC # BLD AUTO: 2.23 M/UL (ref 4.23–5.6)
RBC #/AREA URNS HPF: ABNORMAL /HPF
SERVICE CMNT-IMP: ABNORMAL
SODIUM SERPL-SCNC: 137 MMOL/L (ref 133–143)
SP GR UR REFRACTOMETRY: 1 (ref 1–1.02)
URINE CULTURE IF INDICATED: ABNORMAL
UROBILINOGEN UR QL STRIP.AUTO: 0.2 EU/DL (ref 0.2–1)
WBC # BLD AUTO: 18.6 K/UL (ref 4.3–11.1)
WBC URNS QL MICRO: ABNORMAL /HPF

## 2023-08-02 PROCEDURE — 84145 PROCALCITONIN (PCT): CPT

## 2023-08-02 PROCEDURE — 97112 NEUROMUSCULAR REEDUCATION: CPT

## 2023-08-02 PROCEDURE — 6370000000 HC RX 637 (ALT 250 FOR IP): Performed by: ORTHOPAEDIC SURGERY

## 2023-08-02 PROCEDURE — 94760 N-INVAS EAR/PLS OXIMETRY 1: CPT

## 2023-08-02 PROCEDURE — 36415 COLL VENOUS BLD VENIPUNCTURE: CPT

## 2023-08-02 PROCEDURE — 94660 CPAP INITIATION&MGMT: CPT

## 2023-08-02 PROCEDURE — 86850 RBC ANTIBODY SCREEN: CPT

## 2023-08-02 PROCEDURE — 94664 DEMO&/EVAL PT USE INHALER: CPT

## 2023-08-02 PROCEDURE — 99222 1ST HOSP IP/OBS MODERATE 55: CPT | Performed by: INTERNAL MEDICINE

## 2023-08-02 PROCEDURE — 85014 HEMATOCRIT: CPT

## 2023-08-02 PROCEDURE — 30233N1 TRANSFUSION OF NONAUTOLOGOUS RED BLOOD CELLS INTO PERIPHERAL VEIN, PERCUTANEOUS APPROACH: ICD-10-PCS | Performed by: FAMILY MEDICINE

## 2023-08-02 PROCEDURE — 86923 COMPATIBILITY TEST ELECTRIC: CPT

## 2023-08-02 PROCEDURE — 6370000000 HC RX 637 (ALT 250 FOR IP): Performed by: HOSPITALIST

## 2023-08-02 PROCEDURE — 80048 BASIC METABOLIC PNL TOTAL CA: CPT

## 2023-08-02 PROCEDURE — 86900 BLOOD TYPING SEROLOGIC ABO: CPT

## 2023-08-02 PROCEDURE — 97535 SELF CARE MNGMENT TRAINING: CPT

## 2023-08-02 PROCEDURE — P9016 RBC LEUKOCYTES REDUCED: HCPCS

## 2023-08-02 PROCEDURE — 85018 HEMOGLOBIN: CPT

## 2023-08-02 PROCEDURE — 93306 TTE W/DOPPLER COMPLETE: CPT | Performed by: INTERNAL MEDICINE

## 2023-08-02 PROCEDURE — 6370000000 HC RX 637 (ALT 250 FOR IP): Performed by: INTERNAL MEDICINE

## 2023-08-02 PROCEDURE — 82962 GLUCOSE BLOOD TEST: CPT

## 2023-08-02 PROCEDURE — 86901 BLOOD TYPING SEROLOGIC RH(D): CPT

## 2023-08-02 PROCEDURE — 6360000002 HC RX W HCPCS: Performed by: PHYSICIAN ASSISTANT

## 2023-08-02 PROCEDURE — 97530 THERAPEUTIC ACTIVITIES: CPT

## 2023-08-02 PROCEDURE — 94761 N-INVAS EAR/PLS OXIMETRY MLT: CPT

## 2023-08-02 PROCEDURE — 36430 TRANSFUSION BLD/BLD COMPNT: CPT

## 2023-08-02 PROCEDURE — 93306 TTE W/DOPPLER COMPLETE: CPT

## 2023-08-02 PROCEDURE — 2580000003 HC RX 258: Performed by: ORTHOPAEDIC SURGERY

## 2023-08-02 PROCEDURE — 85025 COMPLETE CBC W/AUTO DIFF WBC: CPT

## 2023-08-02 PROCEDURE — 1100000003 HC PRIVATE W/ TELEMETRY

## 2023-08-02 PROCEDURE — 94640 AIRWAY INHALATION TREATMENT: CPT

## 2023-08-02 RX ORDER — METOPROLOL TARTRATE 50 MG/1
50 TABLET, FILM COATED ORAL EVERY 6 HOURS PRN
Status: DISCONTINUED | OUTPATIENT
Start: 2023-08-02 | End: 2023-08-07 | Stop reason: HOSPADM

## 2023-08-02 RX ORDER — SODIUM CHLORIDE 9 MG/ML
INJECTION, SOLUTION INTRAVENOUS PRN
Status: DISCONTINUED | OUTPATIENT
Start: 2023-08-02 | End: 2023-08-07 | Stop reason: HOSPADM

## 2023-08-02 RX ADMIN — CLONAZEPAM 0.5 MG: 0.5 TABLET ORAL at 20:44

## 2023-08-02 RX ADMIN — ATORVASTATIN CALCIUM 40 MG: 40 TABLET, FILM COATED ORAL at 08:14

## 2023-08-02 RX ADMIN — METOPROLOL TARTRATE 25 MG: 25 TABLET, FILM COATED ORAL at 20:44

## 2023-08-02 RX ADMIN — ENOXAPARIN SODIUM 40 MG: 100 INJECTION SUBCUTANEOUS at 08:14

## 2023-08-02 RX ADMIN — SODIUM CHLORIDE, PRESERVATIVE FREE 5 ML: 5 INJECTION INTRAVENOUS at 20:44

## 2023-08-02 RX ADMIN — SODIUM CHLORIDE, PRESERVATIVE FREE 10 ML: 5 INJECTION INTRAVENOUS at 08:14

## 2023-08-02 RX ADMIN — SODIUM CHLORIDE, PRESERVATIVE FREE 10 ML: 5 INJECTION INTRAVENOUS at 08:15

## 2023-08-02 RX ADMIN — FLUTICASONE PROPIONATE 1 PUFF: 110 AEROSOL, METERED RESPIRATORY (INHALATION) at 19:17

## 2023-08-02 RX ADMIN — PAROXETINE HYDROCHLORIDE 20 MG: 20 TABLET, FILM COATED ORAL at 08:14

## 2023-08-02 RX ADMIN — FLUTICASONE PROPIONATE 1 PUFF: 110 AEROSOL, METERED RESPIRATORY (INHALATION) at 12:16

## 2023-08-02 ASSESSMENT — PAIN SCALES - GENERAL
PAINLEVEL_OUTOF10: 0
PAINLEVEL_OUTOF10: 0

## 2023-08-02 NOTE — CARE COORDINATION
Pt has been accepted for admission to [facility] pending insurance approval.  [facility] initiated  the insurance with request today. CM spoke with pt and updated him with this information. He is in agreement. Pt's  was at the bedside and requested information about resources available to assist with the pt's spouse's care while the pt is hospitalized and in rehab. CM provided pt and  with the book \"All About Seniors\" that has information about hiring non-medical home care. CM also provided them with the contact info for \"[facility]\" who can assist with securing respite placement for the spouse if they are interested in this. CM following to assist as needed.

## 2023-08-02 NOTE — DISCHARGE INSTRUCTIONS
[facility]      IF YOU HAVE ANY PROBLEMS ONCE YOU ARE AT HOME CALL THE FOLLOWING NUMBERS:   Main office number: [phone] ask for Adeline Courtney (medical assistant with Dr. Rachel Chanel)  Office Address: [address] [place] [address]  [facility] Elroy Cade [address] [place] [address], [address] [place] [address]      Patient Discharge Instructions    Vladislav Lee / [id number] : 1960    Admitted 2023           To be given to [address] [place] [address] on Admission         Weight bearing status: As tolerated with walker and assistance    Activity  Continue Physical Therapy and Occupational Therapy: walker boot left foot, knee immobilizer on right leg at all times    Fall precautions     Wound Care  Dry dressing changes using sterile technique every other day or more frequently if needed   Staples are to be left in and removed in our office 2 weeks postop    Diet  Resume regular or diabetic diet      Medications    Patient medications are listed on the medication reconciliation sheet. Follow up   Follow up in our office in 2 weeks postop   All patients are to be transported via stretcher unless they are able to independently get out of a chair and stand without assistance. Information obtained by :  I understand that if any problems occur once I am at home I am to contact my physician. I understand and acknowledge receipt of the instructions indicated above.                                                                                                                                            Physician's or R.N.'s Signature                                                                  Date/Time                                                                                                                                              Patient or Representative Signature                                                          Date/Time

## 2023-08-02 NOTE — CONSULTS
warm, no edema, calves supple/nontender. R leg in brace and ACE wrap, not removed  Skin: warm and dry  Psychiatric: Normal mood and affect  Neurologic: Alert and oriented X 3    Cardiographics    Telemetry: normal sinus rhythm  ECG: normal EKG, normal sinus rhythm, unchanged from previous tracings  Echocardiogram: No results found for this or any previous visit. Cath: No results found for this or any previous visit. EP: No results found for this or any previous visit. Labs:     Recent Labs     08/02/23  1036 08/01/23  0451 07/31/23  1430   WBC 18.6* 13.6* 8.8   HGB 6.6* 8.5* 11.7*   HCT 21.4* 28.1* 37.9*   MCV 96.0 97.6 95.0    242 185     Lab Results   Component Value Date    WBC 18.6 (H) 08/02/2023    HGB 6.6 (LL) 08/02/2023    HCT 21.4 (L) 08/02/2023     08/02/2023    CHOL 179 03/29/2023    TRIG 91 03/29/2023    HDL 32 (L) 03/29/2023    ALT 32 07/31/2023    AST 23 07/31/2023     08/02/2023    K 4.1 08/02/2023     08/02/2023    CREATININE 1.00 08/02/2023    BUN 20 08/02/2023    CO2 26 08/02/2023    INR 1.1 07/31/2023    LABA1C 5.1 03/29/2023        Pt has been seen and examined by Dr. Keiko Milligan. He agrees with the following assessment and plan. Assessment/Plan:       Closed comminuted intra-articular fracture of distal femur, right, initial encounter ([address])  - Noted, POD #2 from ORIF of R distal femur fracture with Dr. Neymar Harrell with orthopedic surgery. PT/OT, pain control per primary        SVT (supraventricular tachycardia) ([address])  - Patient with new onset SVT on 8/1 with HR up to 170s. S/p 6mg Adenosine then 5 of IV metoprolol with rate improvement following. Was in NSR without tachycardia following then had another episode of SVT this AM with HR 160s. Strips in paper chart reviewed. Consider PRN IV lopressor for recurrent tachycardia.  It is of significance that patient is profoundly anemic, Hgb decreased to 6.6 today from 11.7 on arrival, anticipate that is the likely driving

## 2023-08-03 ENCOUNTER — PATIENT MESSAGE (OUTPATIENT)
Dept: INTERNAL MEDICINE CLINIC | Facility: CLINIC | Age: 63
End: 2023-08-03

## 2023-08-03 DIAGNOSIS — F41.9 ANXIETY: Primary | ICD-10-CM

## 2023-08-03 LAB
ABO + RH BLD: NORMAL
ANION GAP SERPL CALC-SCNC: 2 MMOL/L (ref 2–11)
BASOPHILS # BLD: 0 K/UL (ref 0–0.2)
BASOPHILS NFR BLD: 0 % (ref 0–2)
BLD PROD TYP BPU: NORMAL
BLOOD BANK DISPENSE STATUS: NORMAL
BLOOD GROUP ANTIBODIES SERPL: NORMAL
BPU ID: NORMAL
BUN SERPL-MCNC: 16 MG/DL (ref 8–23)
CALCIUM SERPL-MCNC: 8.4 MG/DL (ref 8.3–10.4)
CHLORIDE SERPL-SCNC: 111 MMOL/L (ref 101–110)
CO2 SERPL-SCNC: 29 MMOL/L (ref 21–32)
CREAT SERPL-MCNC: 0.9 MG/DL (ref 0.8–1.5)
CROSSMATCH RESULT: NORMAL
DIFFERENTIAL METHOD BLD: ABNORMAL
EOSINOPHIL # BLD: 0 K/UL (ref 0–0.8)
EOSINOPHIL NFR BLD: 0 % (ref 0.5–7.8)
ERYTHROCYTE [DISTWIDTH] IN BLOOD BY AUTOMATED COUNT: 17.6 % (ref 11.9–14.6)
GLUCOSE BLD STRIP.AUTO-MCNC: 106 MG/DL (ref 65–100)
GLUCOSE BLD STRIP.AUTO-MCNC: 108 MG/DL (ref 65–100)
GLUCOSE BLD STRIP.AUTO-MCNC: 114 MG/DL (ref 65–100)
GLUCOSE BLD STRIP.AUTO-MCNC: 121 MG/DL (ref 65–100)
GLUCOSE SERPL-MCNC: 98 MG/DL (ref 65–100)
HCT VFR BLD AUTO: 23.9 % (ref 41.1–50.3)
HCT VFR BLD AUTO: 24.5 % (ref 41.1–50.3)
HGB BLD-MCNC: 7.3 G/DL (ref 13.6–17.2)
HGB BLD-MCNC: 7.6 G/DL (ref 13.6–17.2)
IMM GRANULOCYTES # BLD AUTO: 0.2 K/UL (ref 0–0.5)
IMM GRANULOCYTES NFR BLD AUTO: 1 % (ref 0–5)
LYMPHOCYTES # BLD: 2.6 K/UL (ref 0.5–4.6)
LYMPHOCYTES NFR BLD: 16 % (ref 13–44)
MCH RBC QN AUTO: 28.5 PG (ref 26.1–32.9)
MCHC RBC AUTO-ENTMCNC: 30.5 G/DL (ref 31.4–35)
MCV RBC AUTO: 93.4 FL (ref 82–102)
MM INDURATION, POC: 0 MM (ref 0–5)
MONOCYTES # BLD: 1.6 K/UL (ref 0.1–1.3)
MONOCYTES NFR BLD: 9 % (ref 4–12)
NEUTS SEG # BLD: 12.4 K/UL (ref 1.7–8.2)
NEUTS SEG NFR BLD: 74 % (ref 43–78)
NRBC # BLD: 0.03 K/UL (ref 0–0.2)
PLATELET # BLD AUTO: 168 K/UL (ref 150–450)
PMV BLD AUTO: 10.5 FL (ref 9.4–12.3)
POTASSIUM SERPL-SCNC: 4 MMOL/L (ref 3.5–5.1)
PPD, POC: NEGATIVE
RBC # BLD AUTO: 2.56 M/UL (ref 4.23–5.6)
SERVICE CMNT-IMP: ABNORMAL
SODIUM SERPL-SCNC: 142 MMOL/L (ref 133–143)
SPECIMEN EXP DATE BLD: NORMAL
UNIT DIVISION: 0
WBC # BLD AUTO: 16.8 K/UL (ref 4.3–11.1)

## 2023-08-03 PROCEDURE — 85018 HEMOGLOBIN: CPT

## 2023-08-03 PROCEDURE — 85014 HEMATOCRIT: CPT

## 2023-08-03 PROCEDURE — 80048 BASIC METABOLIC PNL TOTAL CA: CPT

## 2023-08-03 PROCEDURE — 6370000000 HC RX 637 (ALT 250 FOR IP): Performed by: FAMILY MEDICINE

## 2023-08-03 PROCEDURE — 97535 SELF CARE MNGMENT TRAINING: CPT

## 2023-08-03 PROCEDURE — 97530 THERAPEUTIC ACTIVITIES: CPT

## 2023-08-03 PROCEDURE — 1100000003 HC PRIVATE W/ TELEMETRY

## 2023-08-03 PROCEDURE — 2580000003 HC RX 258: Performed by: ORTHOPAEDIC SURGERY

## 2023-08-03 PROCEDURE — 6370000000 HC RX 637 (ALT 250 FOR IP): Performed by: ORTHOPAEDIC SURGERY

## 2023-08-03 PROCEDURE — 82962 GLUCOSE BLOOD TEST: CPT

## 2023-08-03 PROCEDURE — 85025 COMPLETE CBC W/AUTO DIFF WBC: CPT

## 2023-08-03 PROCEDURE — 36415 COLL VENOUS BLD VENIPUNCTURE: CPT

## 2023-08-03 PROCEDURE — 94640 AIRWAY INHALATION TREATMENT: CPT

## 2023-08-03 PROCEDURE — 6370000000 HC RX 637 (ALT 250 FOR IP): Performed by: INTERNAL MEDICINE

## 2023-08-03 PROCEDURE — 94660 CPAP INITIATION&MGMT: CPT

## 2023-08-03 PROCEDURE — 94760 N-INVAS EAR/PLS OXIMETRY 1: CPT

## 2023-08-03 PROCEDURE — 6370000000 HC RX 637 (ALT 250 FOR IP): Performed by: HOSPITALIST

## 2023-08-03 RX ORDER — FERROUS SULFATE 325(65) MG
325 TABLET ORAL 2 TIMES DAILY WITH MEALS
Status: DISCONTINUED | OUTPATIENT
Start: 2023-08-03 | End: 2023-08-07 | Stop reason: HOSPADM

## 2023-08-03 RX ORDER — PAROXETINE HYDROCHLORIDE 40 MG/1
40 TABLET, FILM COATED ORAL DAILY
Qty: 60 TABLET | Refills: 1 | Status: SHIPPED | OUTPATIENT
Start: 2023-08-03

## 2023-08-03 RX ADMIN — SODIUM CHLORIDE, PRESERVATIVE FREE 10 ML: 5 INJECTION INTRAVENOUS at 08:43

## 2023-08-03 RX ADMIN — PAROXETINE HYDROCHLORIDE 20 MG: 20 TABLET, FILM COATED ORAL at 08:33

## 2023-08-03 RX ADMIN — FERROUS SULFATE TAB 325 MG (65 MG ELEMENTAL FE) 325 MG: 325 (65 FE) TAB at 17:34

## 2023-08-03 RX ADMIN — CLONAZEPAM 0.5 MG: 0.5 TABLET ORAL at 21:12

## 2023-08-03 RX ADMIN — SODIUM CHLORIDE, PRESERVATIVE FREE 5 ML: 5 INJECTION INTRAVENOUS at 21:12

## 2023-08-03 RX ADMIN — POLYETHYLENE GLYCOL 3350 17 G: 17 POWDER, FOR SOLUTION ORAL at 08:42

## 2023-08-03 RX ADMIN — FLUTICASONE PROPIONATE 1 PUFF: 110 AEROSOL, METERED RESPIRATORY (INHALATION) at 19:06

## 2023-08-03 RX ADMIN — FERROUS SULFATE TAB 325 MG (65 MG ELEMENTAL FE) 325 MG: 325 (65 FE) TAB at 13:00

## 2023-08-03 RX ADMIN — METOPROLOL TARTRATE 25 MG: 25 TABLET, FILM COATED ORAL at 08:34

## 2023-08-03 RX ADMIN — SODIUM CHLORIDE, PRESERVATIVE FREE 10 ML: 5 INJECTION INTRAVENOUS at 12:59

## 2023-08-03 RX ADMIN — ACETAMINOPHEN 650 MG: 325 TABLET ORAL at 16:03

## 2023-08-03 RX ADMIN — FLUTICASONE PROPIONATE 1 PUFF: 110 AEROSOL, METERED RESPIRATORY (INHALATION) at 08:15

## 2023-08-03 RX ADMIN — ATORVASTATIN CALCIUM 40 MG: 40 TABLET, FILM COATED ORAL at 08:33

## 2023-08-03 ASSESSMENT — PAIN DESCRIPTION - LOCATION: LOCATION: HEAD

## 2023-08-03 ASSESSMENT — PAIN SCALES - GENERAL
PAINLEVEL_OUTOF10: 0
PAINLEVEL_OUTOF10: 3
PAINLEVEL_OUTOF10: 0

## 2023-08-03 ASSESSMENT — PAIN DESCRIPTION - ORIENTATION: ORIENTATION: ANTERIOR

## 2023-08-03 ASSESSMENT — PAIN DESCRIPTION - DESCRIPTORS: DESCRIPTORS: THROBBING

## 2023-08-03 NOTE — TELEPHONE ENCOUNTER
From: Carine Figueroa  To: Dr. Paula Da Silva  Sent: 8/3/2023 7:49 AM EDT  Subject: Paxil 20     Can this be increased to 40 or is there something better. I'm currently in hospital, July 31 fell broke bones above knee. Next appt is Aug 24.

## 2023-08-03 NOTE — ICUWATCH
RRT Clinical Rounding Nurse Update    Vitals:    08/02/23 1917 08/02/23 2107 08/03/23 0010 08/03/23 0450   BP:       Pulse: (!) 111 92 100 (!) 101   Resp: 16 16 16 16   Temp:       TempSrc:       SpO2: 97% 98% 97% 96%   Weight:       Height:            DETERIORATION INDEX SCORE: 34    ASSESSMENT:  Previous outreach assessment was reviewed. There have been no significant changes since previous assessment. Awake and alert, RN at bedside changing dressing. Last Hgb 8.0. No distress noted at this time. PLAN:  Will follow per RRT Clinical Rounding Program protocol.     Debborah Galeazzi, [address]: [address] [place] [address] [address]: [phone]

## 2023-08-04 LAB
ANION GAP SERPL CALC-SCNC: 4 MMOL/L (ref 2–11)
BASOPHILS # BLD: 0 K/UL (ref 0–0.2)
BASOPHILS NFR BLD: 0 % (ref 0–2)
BUN SERPL-MCNC: 16 MG/DL (ref 8–23)
CALCIUM SERPL-MCNC: 8.2 MG/DL (ref 8.3–10.4)
CHLORIDE SERPL-SCNC: 112 MMOL/L (ref 101–110)
CO2 SERPL-SCNC: 30 MMOL/L (ref 21–32)
CREAT SERPL-MCNC: 0.8 MG/DL (ref 0.8–1.5)
DIFFERENTIAL METHOD BLD: ABNORMAL
EOSINOPHIL # BLD: 0.2 K/UL (ref 0–0.8)
EOSINOPHIL NFR BLD: 1 % (ref 0.5–7.8)
ERYTHROCYTE [DISTWIDTH] IN BLOOD BY AUTOMATED COUNT: 17.6 % (ref 11.9–14.6)
GLUCOSE BLD STRIP.AUTO-MCNC: 109 MG/DL (ref 65–100)
GLUCOSE BLD STRIP.AUTO-MCNC: 118 MG/DL (ref 65–100)
GLUCOSE BLD STRIP.AUTO-MCNC: 123 MG/DL (ref 65–100)
GLUCOSE BLD STRIP.AUTO-MCNC: 90 MG/DL (ref 65–100)
GLUCOSE SERPL-MCNC: 96 MG/DL (ref 65–100)
HCT VFR BLD AUTO: 24 % (ref 41.1–50.3)
HGB BLD-MCNC: 7.3 G/DL (ref 13.6–17.2)
IMM GRANULOCYTES # BLD AUTO: 0.2 K/UL (ref 0–0.5)
IMM GRANULOCYTES NFR BLD AUTO: 2 % (ref 0–5)
LYMPHOCYTES # BLD: 2.4 K/UL (ref 0.5–4.6)
LYMPHOCYTES NFR BLD: 18 % (ref 13–44)
MCH RBC QN AUTO: 28.9 PG (ref 26.1–32.9)
MCHC RBC AUTO-ENTMCNC: 30.4 G/DL (ref 31.4–35)
MCV RBC AUTO: 94.9 FL (ref 82–102)
MM INDURATION, POC: 0 MM (ref 0–5)
MONOCYTES # BLD: 1.2 K/UL (ref 0.1–1.3)
MONOCYTES NFR BLD: 10 % (ref 4–12)
NEUTS SEG # BLD: 8.9 K/UL (ref 1.7–8.2)
NEUTS SEG NFR BLD: 69 % (ref 43–78)
NRBC # BLD: 0.07 K/UL (ref 0–0.2)
PLATELET # BLD AUTO: 177 K/UL (ref 150–450)
PMV BLD AUTO: 10.2 FL (ref 9.4–12.3)
POTASSIUM SERPL-SCNC: 4.2 MMOL/L (ref 3.5–5.1)
PPD, POC: NEGATIVE
RBC # BLD AUTO: 2.53 M/UL (ref 4.23–5.6)
SERVICE CMNT-IMP: ABNORMAL
SERVICE CMNT-IMP: NORMAL
SODIUM SERPL-SCNC: 146 MMOL/L (ref 133–143)
WBC # BLD AUTO: 12.9 K/UL (ref 4.3–11.1)

## 2023-08-04 PROCEDURE — 80048 BASIC METABOLIC PNL TOTAL CA: CPT

## 2023-08-04 PROCEDURE — 94660 CPAP INITIATION&MGMT: CPT

## 2023-08-04 PROCEDURE — 94760 N-INVAS EAR/PLS OXIMETRY 1: CPT

## 2023-08-04 PROCEDURE — 36415 COLL VENOUS BLD VENIPUNCTURE: CPT

## 2023-08-04 PROCEDURE — 6370000000 HC RX 637 (ALT 250 FOR IP): Performed by: ORTHOPAEDIC SURGERY

## 2023-08-04 PROCEDURE — 6370000000 HC RX 637 (ALT 250 FOR IP): Performed by: FAMILY MEDICINE

## 2023-08-04 PROCEDURE — 2580000003 HC RX 258: Performed by: ORTHOPAEDIC SURGERY

## 2023-08-04 PROCEDURE — 82962 GLUCOSE BLOOD TEST: CPT

## 2023-08-04 PROCEDURE — 94640 AIRWAY INHALATION TREATMENT: CPT

## 2023-08-04 PROCEDURE — 1100000003 HC PRIVATE W/ TELEMETRY

## 2023-08-04 PROCEDURE — 85025 COMPLETE CBC W/AUTO DIFF WBC: CPT

## 2023-08-04 PROCEDURE — 97535 SELF CARE MNGMENT TRAINING: CPT

## 2023-08-04 PROCEDURE — 6370000000 HC RX 637 (ALT 250 FOR IP): Performed by: INTERNAL MEDICINE

## 2023-08-04 PROCEDURE — 6370000000 HC RX 637 (ALT 250 FOR IP): Performed by: HOSPITALIST

## 2023-08-04 PROCEDURE — 97530 THERAPEUTIC ACTIVITIES: CPT

## 2023-08-04 RX ADMIN — FLUTICASONE PROPIONATE 1 PUFF: 110 AEROSOL, METERED RESPIRATORY (INHALATION) at 20:14

## 2023-08-04 RX ADMIN — FERROUS SULFATE TAB 325 MG (65 MG ELEMENTAL FE) 325 MG: 325 (65 FE) TAB at 17:08

## 2023-08-04 RX ADMIN — ATORVASTATIN CALCIUM 40 MG: 40 TABLET, FILM COATED ORAL at 08:29

## 2023-08-04 RX ADMIN — METOPROLOL TARTRATE 25 MG: 25 TABLET, FILM COATED ORAL at 08:29

## 2023-08-04 RX ADMIN — PAROXETINE HYDROCHLORIDE 20 MG: 20 TABLET, FILM COATED ORAL at 08:28

## 2023-08-04 RX ADMIN — FERROUS SULFATE TAB 325 MG (65 MG ELEMENTAL FE) 325 MG: 325 (65 FE) TAB at 08:29

## 2023-08-04 RX ADMIN — FLUTICASONE PROPIONATE 1 PUFF: 110 AEROSOL, METERED RESPIRATORY (INHALATION) at 07:21

## 2023-08-04 RX ADMIN — SODIUM CHLORIDE, PRESERVATIVE FREE 10 ML: 5 INJECTION INTRAVENOUS at 20:58

## 2023-08-04 RX ADMIN — SODIUM CHLORIDE, PRESERVATIVE FREE 10 ML: 5 INJECTION INTRAVENOUS at 08:29

## 2023-08-04 RX ADMIN — ACETAMINOPHEN 650 MG: 325 TABLET ORAL at 10:55

## 2023-08-04 RX ADMIN — CLONAZEPAM 0.5 MG: 0.5 TABLET ORAL at 20:58

## 2023-08-04 ASSESSMENT — PAIN DESCRIPTION - FREQUENCY: FREQUENCY: INTERMITTENT

## 2023-08-04 ASSESSMENT — PAIN DESCRIPTION - ONSET: ONSET: SUDDEN

## 2023-08-04 ASSESSMENT — PAIN SCALES - GENERAL
PAINLEVEL_OUTOF10: 0
PAINLEVEL_OUTOF10: 3

## 2023-08-04 ASSESSMENT — PAIN DESCRIPTION - ORIENTATION: ORIENTATION: ANTERIOR

## 2023-08-04 ASSESSMENT — PAIN DESCRIPTION - DESCRIPTORS: DESCRIPTORS: ACHING

## 2023-08-04 ASSESSMENT — PAIN DESCRIPTION - LOCATION: LOCATION: HEAD

## 2023-08-04 ASSESSMENT — PAIN DESCRIPTION - PAIN TYPE: TYPE: ACUTE PAIN

## 2023-08-04 NOTE — CARE COORDINATION
Chart review complete. Insurance approval still ending for admission to [facility]. CM following.

## 2023-08-05 LAB
ANION GAP SERPL CALC-SCNC: 3 MMOL/L (ref 2–11)
BASOPHILS # BLD: 0 K/UL (ref 0–0.2)
BASOPHILS NFR BLD: 0 % (ref 0–2)
BUN SERPL-MCNC: 16 MG/DL (ref 8–23)
CALCIUM SERPL-MCNC: 8.5 MG/DL (ref 8.3–10.4)
CHLORIDE SERPL-SCNC: 108 MMOL/L (ref 101–110)
CO2 SERPL-SCNC: 30 MMOL/L (ref 21–32)
CREAT SERPL-MCNC: 0.8 MG/DL (ref 0.8–1.5)
DIFFERENTIAL METHOD BLD: ABNORMAL
EOSINOPHIL # BLD: 0.2 K/UL (ref 0–0.8)
EOSINOPHIL NFR BLD: 2 % (ref 0.5–7.8)
ERYTHROCYTE [DISTWIDTH] IN BLOOD BY AUTOMATED COUNT: 17.4 % (ref 11.9–14.6)
GLUCOSE BLD STRIP.AUTO-MCNC: 102 MG/DL (ref 65–100)
GLUCOSE BLD STRIP.AUTO-MCNC: 92 MG/DL (ref 65–100)
GLUCOSE SERPL-MCNC: 85 MG/DL (ref 65–100)
HCT VFR BLD AUTO: 23.4 % (ref 41.1–50.3)
HGB BLD-MCNC: 7.2 G/DL (ref 13.6–17.2)
IMM GRANULOCYTES # BLD AUTO: 0.2 K/UL (ref 0–0.5)
IMM GRANULOCYTES NFR BLD AUTO: 2 % (ref 0–5)
LYMPHOCYTES # BLD: 2.5 K/UL (ref 0.5–4.6)
LYMPHOCYTES NFR BLD: 25 % (ref 13–44)
MAGNESIUM SERPL-MCNC: 2.2 MG/DL (ref 1.8–2.4)
MCH RBC QN AUTO: 29 PG (ref 26.1–32.9)
MCHC RBC AUTO-ENTMCNC: 30.8 G/DL (ref 31.4–35)
MCV RBC AUTO: 94.4 FL (ref 82–102)
MONOCYTES # BLD: 1.2 K/UL (ref 0.1–1.3)
MONOCYTES NFR BLD: 12 % (ref 4–12)
NEUTS SEG # BLD: 5.8 K/UL (ref 1.7–8.2)
NEUTS SEG NFR BLD: 59 % (ref 43–78)
NRBC # BLD: 0.03 K/UL (ref 0–0.2)
PLATELET # BLD AUTO: 193 K/UL (ref 150–450)
PMV BLD AUTO: 10.2 FL (ref 9.4–12.3)
POTASSIUM SERPL-SCNC: 3.4 MMOL/L (ref 3.5–5.1)
RBC # BLD AUTO: 2.48 M/UL (ref 4.23–5.6)
SERVICE CMNT-IMP: ABNORMAL
SERVICE CMNT-IMP: NORMAL
SODIUM SERPL-SCNC: 141 MMOL/L (ref 133–143)
WBC # BLD AUTO: 10 K/UL (ref 4.3–11.1)

## 2023-08-05 PROCEDURE — 36415 COLL VENOUS BLD VENIPUNCTURE: CPT

## 2023-08-05 PROCEDURE — 6370000000 HC RX 637 (ALT 250 FOR IP): Performed by: FAMILY MEDICINE

## 2023-08-05 PROCEDURE — 97110 THERAPEUTIC EXERCISES: CPT

## 2023-08-05 PROCEDURE — 94761 N-INVAS EAR/PLS OXIMETRY MLT: CPT

## 2023-08-05 PROCEDURE — 2580000003 HC RX 258: Performed by: ORTHOPAEDIC SURGERY

## 2023-08-05 PROCEDURE — 80048 BASIC METABOLIC PNL TOTAL CA: CPT

## 2023-08-05 PROCEDURE — 85025 COMPLETE CBC W/AUTO DIFF WBC: CPT

## 2023-08-05 PROCEDURE — 97530 THERAPEUTIC ACTIVITIES: CPT

## 2023-08-05 PROCEDURE — 94640 AIRWAY INHALATION TREATMENT: CPT

## 2023-08-05 PROCEDURE — 1100000003 HC PRIVATE W/ TELEMETRY

## 2023-08-05 PROCEDURE — 82962 GLUCOSE BLOOD TEST: CPT

## 2023-08-05 PROCEDURE — 6370000000 HC RX 637 (ALT 250 FOR IP): Performed by: ORTHOPAEDIC SURGERY

## 2023-08-05 PROCEDURE — 94760 N-INVAS EAR/PLS OXIMETRY 1: CPT

## 2023-08-05 PROCEDURE — 83735 ASSAY OF MAGNESIUM: CPT

## 2023-08-05 PROCEDURE — 6370000000 HC RX 637 (ALT 250 FOR IP): Performed by: INTERNAL MEDICINE

## 2023-08-05 PROCEDURE — 6370000000 HC RX 637 (ALT 250 FOR IP): Performed by: HOSPITALIST

## 2023-08-05 RX ADMIN — POTASSIUM BICARBONATE 40 MEQ: 782 TABLET, EFFERVESCENT ORAL at 10:57

## 2023-08-05 RX ADMIN — ATORVASTATIN CALCIUM 40 MG: 40 TABLET, FILM COATED ORAL at 08:38

## 2023-08-05 RX ADMIN — CLONAZEPAM 0.5 MG: 0.5 TABLET ORAL at 20:57

## 2023-08-05 RX ADMIN — PAROXETINE HYDROCHLORIDE 20 MG: 20 TABLET, FILM COATED ORAL at 08:38

## 2023-08-05 RX ADMIN — SODIUM CHLORIDE, PRESERVATIVE FREE 10 ML: 5 INJECTION INTRAVENOUS at 08:39

## 2023-08-05 RX ADMIN — FLUTICASONE PROPIONATE 1 PUFF: 110 AEROSOL, METERED RESPIRATORY (INHALATION) at 07:04

## 2023-08-05 RX ADMIN — METOPROLOL TARTRATE 25 MG: 25 TABLET, FILM COATED ORAL at 20:57

## 2023-08-05 RX ADMIN — SODIUM CHLORIDE, PRESERVATIVE FREE 10 ML: 5 INJECTION INTRAVENOUS at 20:57

## 2023-08-05 RX ADMIN — FERROUS SULFATE TAB 325 MG (65 MG ELEMENTAL FE) 325 MG: 325 (65 FE) TAB at 08:39

## 2023-08-05 RX ADMIN — FLUTICASONE PROPIONATE 1 PUFF: 110 AEROSOL, METERED RESPIRATORY (INHALATION) at 20:11

## 2023-08-05 RX ADMIN — METOPROLOL TARTRATE 25 MG: 25 TABLET, FILM COATED ORAL at 08:39

## 2023-08-05 RX ADMIN — FERROUS SULFATE TAB 325 MG (65 MG ELEMENTAL FE) 325 MG: 325 (65 FE) TAB at 16:57

## 2023-08-05 RX ADMIN — ACETAMINOPHEN 650 MG: 325 TABLET ORAL at 03:49

## 2023-08-05 ASSESSMENT — PAIN - FUNCTIONAL ASSESSMENT: PAIN_FUNCTIONAL_ASSESSMENT: ACTIVITIES ARE NOT PREVENTED

## 2023-08-05 ASSESSMENT — PAIN SCALES - GENERAL
PAINLEVEL_OUTOF10: 0
PAINLEVEL_OUTOF10: 2
PAINLEVEL_OUTOF10: 0
PAINLEVEL_OUTOF10: 0

## 2023-08-05 ASSESSMENT — PAIN DESCRIPTION - LOCATION: LOCATION: HEAD

## 2023-08-05 ASSESSMENT — PAIN DESCRIPTION - DESCRIPTORS: DESCRIPTORS: ACHING

## 2023-08-05 ASSESSMENT — PAIN DESCRIPTION - ORIENTATION: ORIENTATION: POSTERIOR

## 2023-08-06 LAB
ANION GAP SERPL CALC-SCNC: 2 MMOL/L (ref 2–11)
BASOPHILS # BLD: 0 K/UL (ref 0–0.2)
BASOPHILS NFR BLD: 0 % (ref 0–2)
BUN SERPL-MCNC: 18 MG/DL (ref 8–23)
CALCIUM SERPL-MCNC: 8.7 MG/DL (ref 8.3–10.4)
CHLORIDE SERPL-SCNC: 107 MMOL/L (ref 101–110)
CO2 SERPL-SCNC: 31 MMOL/L (ref 21–32)
CREAT SERPL-MCNC: 0.9 MG/DL (ref 0.8–1.5)
DIFFERENTIAL METHOD BLD: ABNORMAL
EOSINOPHIL # BLD: 0.4 K/UL (ref 0–0.8)
EOSINOPHIL NFR BLD: 3 % (ref 0.5–7.8)
ERYTHROCYTE [DISTWIDTH] IN BLOOD BY AUTOMATED COUNT: 18.7 % (ref 11.9–14.6)
GLUCOSE SERPL-MCNC: 90 MG/DL (ref 65–100)
HCT VFR BLD AUTO: 23.6 % (ref 41.1–50.3)
HGB BLD-MCNC: 7.1 G/DL (ref 13.6–17.2)
IMM GRANULOCYTES # BLD AUTO: 0.2 K/UL (ref 0–0.5)
IMM GRANULOCYTES NFR BLD AUTO: 2 % (ref 0–5)
LYMPHOCYTES # BLD: 2.4 K/UL (ref 0.5–4.6)
LYMPHOCYTES NFR BLD: 20 % (ref 13–44)
MCH RBC QN AUTO: 28.9 PG (ref 26.1–32.9)
MCHC RBC AUTO-ENTMCNC: 30.1 G/DL (ref 31.4–35)
MCV RBC AUTO: 95.9 FL (ref 82–102)
MONOCYTES # BLD: 1.4 K/UL (ref 0.1–1.3)
MONOCYTES NFR BLD: 12 % (ref 4–12)
NEUTS SEG # BLD: 7.7 K/UL (ref 1.7–8.2)
NEUTS SEG NFR BLD: 63 % (ref 43–78)
NRBC # BLD: 0.07 K/UL (ref 0–0.2)
PLATELET # BLD AUTO: 220 K/UL (ref 150–450)
PMV BLD AUTO: 10.3 FL (ref 9.4–12.3)
POTASSIUM SERPL-SCNC: 4.2 MMOL/L (ref 3.5–5.1)
RBC # BLD AUTO: 2.46 M/UL (ref 4.23–5.6)
SODIUM SERPL-SCNC: 140 MMOL/L (ref 133–143)
WBC # BLD AUTO: 12.2 K/UL (ref 4.3–11.1)

## 2023-08-06 PROCEDURE — 97530 THERAPEUTIC ACTIVITIES: CPT

## 2023-08-06 PROCEDURE — 80048 BASIC METABOLIC PNL TOTAL CA: CPT

## 2023-08-06 PROCEDURE — 6370000000 HC RX 637 (ALT 250 FOR IP): Performed by: INTERNAL MEDICINE

## 2023-08-06 PROCEDURE — 6370000000 HC RX 637 (ALT 250 FOR IP): Performed by: ORTHOPAEDIC SURGERY

## 2023-08-06 PROCEDURE — 6370000000 HC RX 637 (ALT 250 FOR IP): Performed by: HOSPITALIST

## 2023-08-06 PROCEDURE — 94760 N-INVAS EAR/PLS OXIMETRY 1: CPT

## 2023-08-06 PROCEDURE — 85025 COMPLETE CBC W/AUTO DIFF WBC: CPT

## 2023-08-06 PROCEDURE — 2580000003 HC RX 258: Performed by: ORTHOPAEDIC SURGERY

## 2023-08-06 PROCEDURE — 94660 CPAP INITIATION&MGMT: CPT

## 2023-08-06 PROCEDURE — 36415 COLL VENOUS BLD VENIPUNCTURE: CPT

## 2023-08-06 PROCEDURE — 97110 THERAPEUTIC EXERCISES: CPT

## 2023-08-06 PROCEDURE — 94640 AIRWAY INHALATION TREATMENT: CPT

## 2023-08-06 PROCEDURE — 6370000000 HC RX 637 (ALT 250 FOR IP): Performed by: FAMILY MEDICINE

## 2023-08-06 PROCEDURE — 1100000003 HC PRIVATE W/ TELEMETRY

## 2023-08-06 RX ADMIN — SODIUM CHLORIDE, PRESERVATIVE FREE 10 ML: 5 INJECTION INTRAVENOUS at 21:30

## 2023-08-06 RX ADMIN — CLONAZEPAM 0.5 MG: 0.5 TABLET ORAL at 20:51

## 2023-08-06 RX ADMIN — FERROUS SULFATE TAB 325 MG (65 MG ELEMENTAL FE) 325 MG: 325 (65 FE) TAB at 07:38

## 2023-08-06 RX ADMIN — SODIUM CHLORIDE, PRESERVATIVE FREE 10 ML: 5 INJECTION INTRAVENOUS at 07:39

## 2023-08-06 RX ADMIN — ATORVASTATIN CALCIUM 40 MG: 40 TABLET, FILM COATED ORAL at 07:38

## 2023-08-06 RX ADMIN — ACETAMINOPHEN 650 MG: 325 TABLET ORAL at 21:01

## 2023-08-06 RX ADMIN — ACETAMINOPHEN 650 MG: 325 TABLET ORAL at 07:38

## 2023-08-06 RX ADMIN — METOPROLOL TARTRATE 25 MG: 25 TABLET, FILM COATED ORAL at 20:51

## 2023-08-06 RX ADMIN — SODIUM CHLORIDE, PRESERVATIVE FREE 10 ML: 5 INJECTION INTRAVENOUS at 07:38

## 2023-08-06 RX ADMIN — FERROUS SULFATE TAB 325 MG (65 MG ELEMENTAL FE) 325 MG: 325 (65 FE) TAB at 16:42

## 2023-08-06 RX ADMIN — PAROXETINE HYDROCHLORIDE 20 MG: 20 TABLET, FILM COATED ORAL at 07:38

## 2023-08-06 RX ADMIN — METOPROLOL TARTRATE 25 MG: 25 TABLET, FILM COATED ORAL at 07:38

## 2023-08-06 RX ADMIN — FLUTICASONE PROPIONATE 1 PUFF: 110 AEROSOL, METERED RESPIRATORY (INHALATION) at 19:09

## 2023-08-06 RX ADMIN — FLUTICASONE PROPIONATE 1 PUFF: 110 AEROSOL, METERED RESPIRATORY (INHALATION) at 08:00

## 2023-08-06 ASSESSMENT — PAIN SCALES - GENERAL
PAINLEVEL_OUTOF10: 0
PAINLEVEL_OUTOF10: 0
PAINLEVEL_OUTOF10: 3
PAINLEVEL_OUTOF10: 2

## 2023-08-06 ASSESSMENT — PAIN DESCRIPTION - ORIENTATION
ORIENTATION: RIGHT
ORIENTATION: LEFT;RIGHT

## 2023-08-06 ASSESSMENT — PAIN DESCRIPTION - DESCRIPTORS
DESCRIPTORS: ACHING
DESCRIPTORS: ACHING

## 2023-08-06 ASSESSMENT — PAIN DESCRIPTION - ONSET: ONSET: SUDDEN

## 2023-08-06 ASSESSMENT — PAIN DESCRIPTION - LOCATION
LOCATION: LEG
LOCATION: LEG

## 2023-08-06 ASSESSMENT — PAIN - FUNCTIONAL ASSESSMENT
PAIN_FUNCTIONAL_ASSESSMENT: ACTIVITIES ARE NOT PREVENTED
PAIN_FUNCTIONAL_ASSESSMENT: ACTIVITIES ARE NOT PREVENTED

## 2023-08-06 ASSESSMENT — PAIN DESCRIPTION - PAIN TYPE: TYPE: ACUTE PAIN

## 2023-08-06 ASSESSMENT — PAIN DESCRIPTION - FREQUENCY: FREQUENCY: INTERMITTENT

## 2023-08-07 ENCOUNTER — HOSPITAL ENCOUNTER (INPATIENT)
Age: 63
LOS: 11 days | Discharge: HOME | DRG: 560 | End: 2023-08-18
Attending: PHYSICAL MEDICINE & REHABILITATION | Admitting: PHYSICAL MEDICINE & REHABILITATION
Payer: COMMERCIAL

## 2023-08-07 VITALS
TEMPERATURE: 99.1 F | OXYGEN SATURATION: 95 % | HEIGHT: 68 IN | SYSTOLIC BLOOD PRESSURE: 133 MMHG | HEART RATE: 75 BPM | BODY MASS INDEX: 24.25 KG/M2 | DIASTOLIC BLOOD PRESSURE: 77 MMHG | RESPIRATION RATE: 19 BRPM | WEIGHT: 160 LBS

## 2023-08-07 DIAGNOSIS — R06.2 WHEEZING: ICD-10-CM

## 2023-08-07 PROBLEM — Z87.81 HISTORY OF FEMUR FRACTURE: Status: ACTIVE | Noted: 2023-08-07

## 2023-08-07 LAB
ANION GAP SERPL CALC-SCNC: 4 MMOL/L (ref 2–11)
BASOPHILS # BLD: 0.1 K/UL (ref 0–0.2)
BASOPHILS NFR BLD: 1 % (ref 0–2)
BUN SERPL-MCNC: 19 MG/DL (ref 8–23)
CALCIUM SERPL-MCNC: 9.4 MG/DL (ref 8.3–10.4)
CHLORIDE SERPL-SCNC: 107 MMOL/L (ref 101–110)
CO2 SERPL-SCNC: 29 MMOL/L (ref 21–32)
CREAT SERPL-MCNC: 0.9 MG/DL (ref 0.8–1.5)
DIFFERENTIAL METHOD BLD: ABNORMAL
EOSINOPHIL # BLD: 0.4 K/UL (ref 0–0.8)
EOSINOPHIL NFR BLD: 3 % (ref 0.5–7.8)
ERYTHROCYTE [DISTWIDTH] IN BLOOD BY AUTOMATED COUNT: 19.4 % (ref 11.9–14.6)
GLUCOSE SERPL-MCNC: 110 MG/DL (ref 65–100)
HCT VFR BLD AUTO: 27.9 % (ref 41.1–50.3)
HGB BLD-MCNC: 8.2 G/DL (ref 13.6–17.2)
IMM GRANULOCYTES # BLD AUTO: 0.6 K/UL (ref 0–0.5)
IMM GRANULOCYTES NFR BLD AUTO: 5 % (ref 0–5)
LYMPHOCYTES # BLD: 3 K/UL (ref 0.5–4.6)
LYMPHOCYTES NFR BLD: 23 % (ref 13–44)
MCH RBC QN AUTO: 29.4 PG (ref 26.1–32.9)
MCHC RBC AUTO-ENTMCNC: 29.4 G/DL (ref 31.4–35)
MCV RBC AUTO: 100 FL (ref 82–102)
MONOCYTES # BLD: 1.6 K/UL (ref 0.1–1.3)
MONOCYTES NFR BLD: 12 % (ref 4–12)
NEUTS SEG # BLD: 7.4 K/UL (ref 1.7–8.2)
NEUTS SEG NFR BLD: 56 % (ref 43–78)
NRBC # BLD: 0.08 K/UL (ref 0–0.2)
PLATELET # BLD AUTO: 323 K/UL (ref 150–450)
PMV BLD AUTO: 10.5 FL (ref 9.4–12.3)
POTASSIUM SERPL-SCNC: 4.1 MMOL/L (ref 3.5–5.1)
RBC # BLD AUTO: 2.79 M/UL (ref 4.23–5.6)
SODIUM SERPL-SCNC: 140 MMOL/L (ref 133–143)
WBC # BLD AUTO: 13.1 K/UL (ref 4.3–11.1)

## 2023-08-07 PROCEDURE — 85025 COMPLETE CBC W/AUTO DIFF WBC: CPT

## 2023-08-07 PROCEDURE — 94640 AIRWAY INHALATION TREATMENT: CPT

## 2023-08-07 PROCEDURE — 2580000003 HC RX 258: Performed by: ORTHOPAEDIC SURGERY

## 2023-08-07 PROCEDURE — 97165 OT EVAL LOW COMPLEX 30 MIN: CPT

## 2023-08-07 PROCEDURE — 97535 SELF CARE MNGMENT TRAINING: CPT

## 2023-08-07 PROCEDURE — 1180000000 HC REHAB R&B

## 2023-08-07 PROCEDURE — 94760 N-INVAS EAR/PLS OXIMETRY 1: CPT

## 2023-08-07 PROCEDURE — 80048 BASIC METABOLIC PNL TOTAL CA: CPT

## 2023-08-07 PROCEDURE — 6370000000 HC RX 637 (ALT 250 FOR IP): Performed by: ORTHOPAEDIC SURGERY

## 2023-08-07 PROCEDURE — 99222 1ST HOSP IP/OBS MODERATE 55: CPT | Performed by: PHYSICAL MEDICINE & REHABILITATION

## 2023-08-07 PROCEDURE — 94660 CPAP INITIATION&MGMT: CPT

## 2023-08-07 PROCEDURE — 6370000000 HC RX 637 (ALT 250 FOR IP): Performed by: INTERNAL MEDICINE

## 2023-08-07 PROCEDURE — 97530 THERAPEUTIC ACTIVITIES: CPT

## 2023-08-07 PROCEDURE — 36415 COLL VENOUS BLD VENIPUNCTURE: CPT

## 2023-08-07 PROCEDURE — 97161 PT EVAL LOW COMPLEX 20 MIN: CPT

## 2023-08-07 PROCEDURE — 97542 WHEELCHAIR MNGMENT TRAINING: CPT

## 2023-08-07 PROCEDURE — 6370000000 HC RX 637 (ALT 250 FOR IP): Performed by: PHYSICAL MEDICINE & REHABILITATION

## 2023-08-07 PROCEDURE — 6370000000 HC RX 637 (ALT 250 FOR IP): Performed by: FAMILY MEDICINE

## 2023-08-07 RX ORDER — DEXTROSE MONOHYDRATE 100 MG/ML
INJECTION, SOLUTION INTRAVENOUS CONTINUOUS PRN
Status: CANCELLED | OUTPATIENT
Start: 2023-08-07

## 2023-08-07 RX ORDER — CLONAZEPAM 0.5 MG/1
0.5 TABLET ORAL NIGHTLY
Status: CANCELLED | OUTPATIENT
Start: 2023-08-07

## 2023-08-07 RX ORDER — ONDANSETRON 4 MG/1
4 TABLET, ORALLY DISINTEGRATING ORAL EVERY 8 HOURS PRN
Status: DISCONTINUED | OUTPATIENT
Start: 2023-08-07 | End: 2023-08-18 | Stop reason: HOSPADM

## 2023-08-07 RX ORDER — POLYETHYLENE GLYCOL 3350 17 G/17G
17 POWDER, FOR SOLUTION ORAL DAILY PRN
Status: CANCELLED | OUTPATIENT
Start: 2023-08-07

## 2023-08-07 RX ORDER — ENEMA 19; 7 G/133ML; G/133ML
1 ENEMA RECTAL DAILY PRN
Status: DISCONTINUED | OUTPATIENT
Start: 2023-08-07 | End: 2023-08-18 | Stop reason: HOSPADM

## 2023-08-07 RX ORDER — ENOXAPARIN SODIUM 100 MG/ML
40 INJECTION SUBCUTANEOUS DAILY
Status: DISCONTINUED | OUTPATIENT
Start: 2023-08-08 | End: 2023-08-07 | Stop reason: SDUPTHER

## 2023-08-07 RX ORDER — FLUTICASONE PROPIONATE 110 UG/1
1 AEROSOL, METERED RESPIRATORY (INHALATION)
Status: DISCONTINUED | OUTPATIENT
Start: 2023-08-08 | End: 2023-08-18 | Stop reason: HOSPADM

## 2023-08-07 RX ORDER — ACETAMINOPHEN 325 MG/1
650 TABLET ORAL EVERY 4 HOURS PRN
Status: CANCELLED | OUTPATIENT
Start: 2023-08-07

## 2023-08-07 RX ORDER — FLUTICASONE PROPIONATE 110 UG/1
1 AEROSOL, METERED RESPIRATORY (INHALATION)
Status: DISCONTINUED | OUTPATIENT
Start: 2023-08-07 | End: 2023-08-07

## 2023-08-07 RX ORDER — ONDANSETRON 2 MG/ML
4 INJECTION INTRAMUSCULAR; INTRAVENOUS EVERY 6 HOURS PRN
Status: CANCELLED | OUTPATIENT
Start: 2023-08-07

## 2023-08-07 RX ORDER — ATORVASTATIN CALCIUM 40 MG/1
40 TABLET, FILM COATED ORAL DAILY
Status: DISCONTINUED | OUTPATIENT
Start: 2023-08-08 | End: 2023-08-18 | Stop reason: HOSPADM

## 2023-08-07 RX ORDER — ATORVASTATIN CALCIUM 40 MG/1
40 TABLET, FILM COATED ORAL DAILY
Status: CANCELLED | OUTPATIENT
Start: 2023-08-08

## 2023-08-07 RX ORDER — POLYETHYLENE GLYCOL 3350 17 G/17G
17 POWDER, FOR SOLUTION ORAL DAILY PRN
Status: DISCONTINUED | OUTPATIENT
Start: 2023-08-07 | End: 2023-08-18 | Stop reason: HOSPADM

## 2023-08-07 RX ORDER — ENOXAPARIN SODIUM 100 MG/ML
40 INJECTION SUBCUTANEOUS DAILY
Status: DISCONTINUED | OUTPATIENT
Start: 2023-08-07 | End: 2023-08-18 | Stop reason: HOSPADM

## 2023-08-07 RX ORDER — ONDANSETRON 4 MG/1
4 TABLET, ORALLY DISINTEGRATING ORAL EVERY 8 HOURS PRN
Status: CANCELLED | OUTPATIENT
Start: 2023-08-07

## 2023-08-07 RX ORDER — ACETAMINOPHEN 325 MG/1
650 TABLET ORAL EVERY 4 HOURS PRN
Status: DISCONTINUED | OUTPATIENT
Start: 2023-08-07 | End: 2023-08-18 | Stop reason: HOSPADM

## 2023-08-07 RX ORDER — ACETAMINOPHEN 325 MG/1
650 TABLET ORAL EVERY 6 HOURS PRN
Status: CANCELLED | OUTPATIENT
Start: 2023-08-07

## 2023-08-07 RX ORDER — ENOXAPARIN SODIUM 100 MG/ML
40 INJECTION SUBCUTANEOUS DAILY
Status: CANCELLED | OUTPATIENT
Start: 2023-08-08

## 2023-08-07 RX ORDER — CALCIUM CARBONATE 500 MG/1
500 TABLET, CHEWABLE ORAL 3 TIMES DAILY PRN
Status: DISCONTINUED | OUTPATIENT
Start: 2023-08-07 | End: 2023-08-18 | Stop reason: HOSPADM

## 2023-08-07 RX ORDER — ENEMA 19; 7 G/133ML; G/133ML
1 ENEMA RECTAL DAILY PRN
Status: CANCELLED | OUTPATIENT
Start: 2023-08-07

## 2023-08-07 RX ORDER — ACETAMINOPHEN 650 MG/1
650 SUPPOSITORY RECTAL EVERY 6 HOURS PRN
Status: CANCELLED | OUTPATIENT
Start: 2023-08-07

## 2023-08-07 RX ORDER — METOPROLOL TARTRATE 50 MG/1
50 TABLET, FILM COATED ORAL EVERY 6 HOURS PRN
Status: CANCELLED | OUTPATIENT
Start: 2023-08-07

## 2023-08-07 RX ORDER — PAROXETINE HYDROCHLORIDE 20 MG/1
20 TABLET, FILM COATED ORAL DAILY
Status: DISCONTINUED | OUTPATIENT
Start: 2023-08-08 | End: 2023-08-18 | Stop reason: HOSPADM

## 2023-08-07 RX ORDER — CLONAZEPAM 0.5 MG/1
0.5 TABLET ORAL NIGHTLY
Status: DISCONTINUED | OUTPATIENT
Start: 2023-08-07 | End: 2023-08-18 | Stop reason: HOSPADM

## 2023-08-07 RX ORDER — ACETAMINOPHEN 325 MG/1
650 TABLET ORAL EVERY 6 HOURS PRN
Status: DISCONTINUED | OUTPATIENT
Start: 2023-08-07 | End: 2023-08-07 | Stop reason: SDUPTHER

## 2023-08-07 RX ORDER — FERROUS SULFATE 325(65) MG
325 TABLET ORAL 2 TIMES DAILY WITH MEALS
Status: CANCELLED | OUTPATIENT
Start: 2023-08-07

## 2023-08-07 RX ORDER — PAROXETINE HYDROCHLORIDE 20 MG/1
20 TABLET, FILM COATED ORAL DAILY
Status: CANCELLED | OUTPATIENT
Start: 2023-08-08

## 2023-08-07 RX ORDER — FLUTICASONE PROPIONATE 110 UG/1
1 AEROSOL, METERED RESPIRATORY (INHALATION)
Status: CANCELLED | OUTPATIENT
Start: 2023-08-07

## 2023-08-07 RX ORDER — DEXTROSE MONOHYDRATE 100 MG/ML
INJECTION, SOLUTION INTRAVENOUS CONTINUOUS PRN
Status: DISCONTINUED | OUTPATIENT
Start: 2023-08-07 | End: 2023-08-18 | Stop reason: HOSPADM

## 2023-08-07 RX ORDER — CALCIUM CARBONATE 500 MG/1
500 TABLET, CHEWABLE ORAL 3 TIMES DAILY PRN
Status: CANCELLED | OUTPATIENT
Start: 2023-08-07

## 2023-08-07 RX ORDER — ENOXAPARIN SODIUM 100 MG/ML
40 INJECTION SUBCUTANEOUS DAILY
Status: CANCELLED | OUTPATIENT
Start: 2023-08-07

## 2023-08-07 RX ORDER — ACETAMINOPHEN 650 MG/1
650 SUPPOSITORY RECTAL EVERY 6 HOURS PRN
Status: DISCONTINUED | OUTPATIENT
Start: 2023-08-07 | End: 2023-08-18 | Stop reason: HOSPADM

## 2023-08-07 RX ORDER — ONDANSETRON 2 MG/ML
4 INJECTION INTRAMUSCULAR; INTRAVENOUS EVERY 6 HOURS PRN
Status: DISCONTINUED | OUTPATIENT
Start: 2023-08-07 | End: 2023-08-18 | Stop reason: HOSPADM

## 2023-08-07 RX ORDER — FERROUS SULFATE 325(65) MG
325 TABLET ORAL 2 TIMES DAILY WITH MEALS
Status: DISCONTINUED | OUTPATIENT
Start: 2023-08-07 | End: 2023-08-18 | Stop reason: HOSPADM

## 2023-08-07 RX ORDER — OXYCODONE HYDROCHLORIDE 10 MG/1
10 TABLET ORAL EVERY 6 HOURS PRN
Qty: 30 TABLET | Refills: 0 | Status: ON HOLD | OUTPATIENT
Start: 2023-08-07 | End: 2023-08-21

## 2023-08-07 RX ADMIN — METOPROLOL TARTRATE 25 MG: 25 TABLET, FILM COATED ORAL at 09:05

## 2023-08-07 RX ADMIN — SODIUM CHLORIDE, PRESERVATIVE FREE 10 ML: 5 INJECTION INTRAVENOUS at 09:07

## 2023-08-07 RX ADMIN — FERROUS SULFATE TAB 325 MG (65 MG ELEMENTAL FE) 325 MG: 325 (65 FE) TAB at 09:05

## 2023-08-07 RX ADMIN — ACETAMINOPHEN 650 MG: 325 TABLET ORAL at 14:44

## 2023-08-07 RX ADMIN — METOPROLOL TARTRATE 25 MG: 25 TABLET, FILM COATED ORAL at 20:38

## 2023-08-07 RX ADMIN — CLONAZEPAM 0.5 MG: 0.5 TABLET ORAL at 20:38

## 2023-08-07 RX ADMIN — ATORVASTATIN CALCIUM 40 MG: 40 TABLET, FILM COATED ORAL at 09:06

## 2023-08-07 RX ADMIN — FLUTICASONE PROPIONATE 1 PUFF: 110 AEROSOL, METERED RESPIRATORY (INHALATION) at 07:58

## 2023-08-07 RX ADMIN — SODIUM CHLORIDE, PRESERVATIVE FREE 10 ML: 5 INJECTION INTRAVENOUS at 09:06

## 2023-08-07 RX ADMIN — PAROXETINE HYDROCHLORIDE 20 MG: 20 TABLET, FILM COATED ORAL at 09:05

## 2023-08-07 RX ADMIN — FERROUS SULFATE TAB 325 MG (65 MG ELEMENTAL FE) 325 MG: 325 (65 FE) TAB at 17:30

## 2023-08-07 ASSESSMENT — PAIN SCALES - GENERAL
PAINLEVEL_OUTOF10: 0
PAINLEVEL_OUTOF10: 0
PAINLEVEL_OUTOF10: 2

## 2023-08-07 ASSESSMENT — PAIN DESCRIPTION - LOCATION: LOCATION: HEAD

## 2023-08-07 ASSESSMENT — PAIN DESCRIPTION - DESCRIPTORS: DESCRIPTORS: ACHING

## 2023-08-07 NOTE — CARE COORDINATION
Insurance approval received. Pt is medically cleared for dc and will transfer to [facility] for acute inpatient rehab services. CM remains available to assist as needed. 08/07/23 0853   Service Assessment   Patient Orientation Alert and Oriented   Cognition Alert   History Provided By Patient;Medical Record   Primary Caregiver Self   Support Systems Spouse/Significant Other;[employer]   Patient's Healthcare Decision Maker is: Legal Next of Kin   PCP Verified by CM Yes   Prior Functional Level Independent in ADLs/IADLs   Current Functional Level Assistance with the following:;Mobility; Bathing;Dressing   Can patient return to prior living arrangement No   Ability to make needs known: Good   Family able to assist with home care needs: No   Would you like for me to discuss the discharge plan with any other family members/significant others, and if so, who? No   Financial Resources Medicare   Social/Functional History   Lives With Spouse   Type of [address] One level   Home Access Level entry   Bathroom Shower/Tub Tub/Shower unit  (slide shower chair)   Brandt Renay   Homemaking Responsibilities Yes   Ambulation Assistance Independent   Transfer Assistance Independent   Active  Yes   Mode of Transportation Car   Occupation Retired   Discharge Planning   Type of [facility] Prior To Admission None   Potential Assistance Needed Other (Comment)  (acute rehab)   DME Ordered? No   Potential Assistance Purchasing Medications No   Type of Home Care Services None   Patient expects to be discharged to: Rehabilitation facility   One/Two Story Residence One story   History of falls?  1   Services At/After Discharge   Transition of Care Consult (CM Consult) Discharge Planning;Acute [facility] Discharge Inpatient rehab;OT;PT;Nursing services   [employer]

## 2023-08-07 NOTE — H&P
[facility]      Admission History and Physical Exam  [facility] Date: 8/7/2023  Surgeon: Francois Barrett MD   Primary Care Provider: Tito Briceno DO  Specialty Group / Referring Service: Orthopedics    Chief Complaint : Impaired ability to ambulate, transfer, and carryout self care tasks d/t a right distal femur fracture post ORIF in a knee immobilizer and and left 5th metatarsal fracture, both injuries WBAT. Admitting Diagnosis:   History of femur fracture [Z87.81]    Secondary diagnosis:  Post op pain  Asthma  HLD  Anxiety  RICCI on a CPAP    Principal Problem:    History of femur fracture  Resolved Problems:    * No resolved hospital problems. *      Acute Rehab Diagnoses:  Encounter for rehabilitation [Z51.89]   Abnormality of gait and mobility [R26.9]  Decreased independence for activities of daily living (ADL) [Z78.9]  Physical debility / deconditioning [R53.81]  Post op pain  Impaired balance    Medical Dx:  Past Medical History:   Diagnosis Date    Anxiety     High cholesterol     Sleep apnea        Date of Evaluation: August 7, 2023    Shzaia Woods is a 60 y.o. male patient at [address] [place] [address] who was admitted to [address] [place] [address] on 8/7/2023 by Bryce Casper MD of Physical Medicine and Rehab service with below-mentioned medical history. HPI: Per Pt and chart documents. Shazia Woods is a 60 y.o. RHD male with medical history of RICCI, on CPAP, mild intermittent asthma, hypercholesterolemia, anxiety, and vitamin D deficiency presented to the [facility] ED after a fall 7/31/23. He was found to have closed comminuted right distal femur fracture. Ortho was consulted and he is s/p right femur retrograde nail on 7/31/23 by Francois Barrett MD (Ortho). He also appears to have a Briseno fracture (5th metatarsal fracture) of the left foot and Ortho recommend conservative management with a walking boot.      Rapid

## 2023-08-07 NOTE — PLAN OF CARE
Problem: Respiratory - Adult  Goal: Achieves optimal ventilation and oxygenation  8/7/2023 0801 by Dipika Blackman RCP  Outcome: Progressing  8/7/2023 0446 by Ivonne Morelos RN  Outcome: Progressing
Problem: Respiratory - Adult  Goal: Achieves optimal ventilation and oxygenation  Outcome: Adequate for Discharge  Flowsheets (Taken 8/4/2023 1717)  Achieves optimal ventilation and oxygenation:   Assess for changes in respiratory status   Respiratory therapy support as indicated   Encourage broncho-pulmonary hygiene including cough, deep breathe, incentive spirometry   Oxygen supplementation based on oxygen saturation or arterial blood gases
Problem: Safety - Adult  Goal: Free from fall injury  8/1/2023 0909 by Albert Morgan RN  Outcome: Progressing  8/1/2023 0107 by Vinicio Ellsworth RN  Outcome: Progressing     Problem: ABCDS Injury Assessment  Goal: Absence of physical injury  8/1/2023 0909 by Albert Morgan RN  Outcome: Progressing  8/1/2023 0107 by Vinicio Ellsworth RN  Outcome: Progressing     Problem: Pain  Goal: Verbalizes/displays adequate comfort level or baseline comfort level  8/1/2023 0909 by Albert Morgan RN  Outcome: Progressing  8/1/2023 0107 by Vinicio Ellsworth RN  Outcome: Progressing     Problem: Discharge Planning  Goal: Discharge to home or other facility with appropriate resources  8/1/2023 0909 by Albert Morgan RN  Outcome: Progressing  8/1/2023 0107 by Vinicio Ellsworth RN  Outcome: Progressing
Problem: Safety - Adult  Goal: Free from fall injury  8/1/2023 2249 by Judi Campos RN  Outcome: Progressing  Flowsheets (Taken 8/1/2023 2033)  Free From Fall Injury: Instruct family/caregiver on patient safety  8/1/2023 0909 by Obed Runner, RN  Outcome: Progressing     Problem: ABCDS Injury Assessment  Goal: Absence of physical injury  8/1/2023 2249 by Judi Campos RN  Outcome: Progressing  8/1/2023 0909 by Obed Runner, RN  Outcome: Progressing     Problem: Pain  Goal: Verbalizes/displays adequate comfort level or baseline comfort level  8/1/2023 2249 by Judi Campos RN  Outcome: Progressing  Flowsheets (Taken 8/1/2023 2033)  Verbalizes/displays adequate comfort level or baseline comfort level:   Encourage patient to monitor pain and request assistance   Assess pain using appropriate pain scale   Administer analgesics based on type and severity of pain and evaluate response  8/1/2023 0909 by Obed Runner, RN  Outcome: Progressing     Problem: Discharge Planning  Goal: Discharge to home or other facility with appropriate resources  8/1/2023 2249 by Judi Campos RN  Outcome: Progressing  Flowsheets (Taken 8/1/2023 2033)  Discharge to home or other facility with appropriate resources: Identify barriers to discharge with patient and caregiver  8/1/2023 0909 by Obed Runner, RN  Outcome: Progressing
Problem: Safety - Adult  Goal: Free from fall injury  8/2/2023 0739 by Stephania Angulo RN  Outcome: Progressing  8/1/2023 2249 by Joseluis Villareal RN  Outcome: Progressing  Flowsheets (Taken 8/1/2023 2033)  Free From Fall Injury: Instruct family/caregiver on patient safety     Problem: ABCDS Injury Assessment  Goal: Absence of physical injury  8/2/2023 0739 by Stephania Angulo RN  Outcome: Progressing  8/1/2023 2249 by Joseluis Villareal RN  Outcome: Progressing     Problem: Pain  Goal: Verbalizes/displays adequate comfort level or baseline comfort level  8/2/2023 0739 by Stephania Angulo RN  Outcome: Progressing  8/1/2023 2249 by Joseluis Villareal RN  Outcome: Progressing  Flowsheets (Taken 8/1/2023 2033)  Verbalizes/displays adequate comfort level or baseline comfort level:   Encourage patient to monitor pain and request assistance   Assess pain using appropriate pain scale   Administer analgesics based on type and severity of pain and evaluate response     Problem: Discharge Planning  Goal: Discharge to home or other facility with appropriate resources  8/2/2023 0739 by Stephania Angulo RN  Outcome: Progressing  8/1/2023 2249 by Joseluis Villareal RN  Outcome: Progressing  Flowsheets (Taken 8/1/2023 2033)  Discharge to home or other facility with appropriate resources: Identify barriers to discharge with patient and caregiver
Problem: Safety - Adult  Goal: Free from fall injury  8/3/2023 2205 by Familia Gonzalez RN  Outcome: Progressing  Flowsheets (Taken 8/3/2023 2032)  Free From Fall Injury: Instruct family/caregiver on patient safety  8/3/2023 1102 by Daphnie Villagomez RN  Outcome: Progressing  Flowsheets (Taken 8/3/2023 0817)  Free From Fall Injury:   Instruct family/caregiver on patient safety   Based on caregiver fall risk screen, instruct family/caregiver to ask for assistance with transferring infant if caregiver noted to have fall risk factors     Problem: ABCDS Injury Assessment  Goal: Absence of physical injury  8/3/2023 2205 by Familia Gonzalez RN  Outcome: Progressing  8/3/2023 1102 by Daphnie Villagomez RN  Outcome: Progressing  Flowsheets (Taken 8/3/2023 0817)  Absence of Physical Injury: Implement safety measures based on patient assessment     Problem: Pain  Goal: Verbalizes/displays adequate comfort level or baseline comfort level  8/3/2023 2205 by Familia Gonzalez RN  Outcome: Progressing  8/3/2023 1102 by Daphnie Villagomez RN  Outcome: Progressing     Problem: Discharge Planning  Goal: Discharge to home or other facility with appropriate resources  8/3/2023 2205 by Familia Gonzalez RN  Outcome: Progressing  Flowsheets (Taken 8/3/2023 2032)  Discharge to home or other facility with appropriate resources: Identify barriers to discharge with patient and caregiver  8/3/2023 1102 by Daphnie Villagomez RN  Outcome: Progressing  Flowsheets (Taken 8/3/2023 6520)  Discharge to home or other facility with appropriate resources:   Identify barriers to discharge with patient and caregiver   Arrange for needed discharge resources and transportation as appropriate
Problem: Safety - Adult  Goal: Free from fall injury  8/4/2023 2314 by Karen Degroot RN  Outcome: Progressing  8/4/2023 1004 by Radha Moore RN  Outcome: Progressing     Problem: ABCDS Injury Assessment  Goal: Absence of physical injury  8/4/2023 2314 by Karen Degroot RN  Outcome: Progressing  8/4/2023 1004 by Radha Moore RN  Outcome: Progressing  Flowsheets (Taken 8/4/2023 0830)  Absence of Physical Injury: Implement safety measures based on patient assessment     Problem: Pain  Goal: Verbalizes/displays adequate comfort level or baseline comfort level  8/4/2023 2314 by Karen Degroot RN  Outcome: Progressing  8/4/2023 1004 by Radha Moore RN  Outcome: Progressing     Problem: Discharge Planning  Goal: Discharge to home or other facility with appropriate resources  8/4/2023 2314 by Karen Degroot RN  Outcome: Progressing  8/4/2023 1004 by Radha Moore RN  Outcome: Progressing  Flowsheets (Taken 8/4/2023 0830)  Discharge to home or other facility with appropriate resources: Identify barriers to discharge with patient and caregiver     Problem: Cardiovascular - Adult  Goal: Maintains optimal cardiac output and hemodynamic stability  8/4/2023 2314 by Karen Degroot RN  Outcome: Progressing  8/4/2023 1004 by Radha Moore RN  Outcome: Progressing     Problem: Musculoskeletal - Adult  Goal: Return ADL status to a safe level of function  8/4/2023 2314 by Karen Degroot RN  Outcome: Progressing  8/4/2023 1004 by Radha Moore RN  Outcome: Progressing     Problem: Hematologic - Adult  Goal: Maintains hematologic stability  8/4/2023 2314 by Karen Degroot RN  Outcome: Progressing  8/4/2023 1004 by Radha Moore RN  Outcome: Progressing     Problem: Skin/Tissue Integrity - Adult  Goal: Skin integrity remains intact  8/4/2023 2314 by Karen Degroot RN  Outcome: Progressing  Flowsheets (Taken 8/4/2023 1944)  Skin Integrity Remains Intact: Monitor for areas of redness and/or skin breakdown  8/4/2023
Problem: Safety - Adult  Goal: Free from fall injury  Outcome: Progressing     Problem: ABCDS Injury Assessment  Goal: Absence of physical injury  Outcome: Progressing     Problem: Pain  Goal: Verbalizes/displays adequate comfort level or baseline comfort level  Outcome: Progressing     Problem: Discharge Planning  Goal: Discharge to home or other facility with appropriate resources  Outcome: Progressing     Problem: Cardiovascular - Adult  Goal: Maintains optimal cardiac output and hemodynamic stability  Outcome: Progressing     Problem: Musculoskeletal - Adult  Goal: Return ADL status to a safe level of function  Outcome: Progressing     Problem: Hematologic - Adult  Goal: Maintains hematologic stability  Outcome: Progressing     Problem: Skin/Tissue Integrity - Adult  Goal: Skin integrity remains intact  Outcome: Progressing  Goal: Incisions, wounds, or drain sites healing without S/S of infection  Outcome: Progressing     Problem: Metabolic/Fluid and Electrolytes - Adult  Goal: Electrolytes maintained within normal limits  Outcome: Progressing  Goal: Hemodynamic stability and optimal renal function maintained  Outcome: Progressing     Problem: Respiratory - Adult  Goal: Achieves optimal ventilation and oxygenation  Outcome: Progressing
Problem: Safety - Adult  Goal: Free from fall injury  Outcome: Progressing     Problem: ABCDS Injury Assessment  Goal: Absence of physical injury  Outcome: Progressing     Problem: Pain  Goal: Verbalizes/displays adequate comfort level or baseline comfort level  Outcome: Progressing     Problem: Discharge Planning  Goal: Discharge to home or other facility with appropriate resources  Outcome: Progressing  Flowsheets (Taken 8/2/2023 2023)  Discharge to home or other facility with appropriate resources: Identify barriers to discharge with patient and caregiver
stability  8/5/2023 2226 by Amparo Oates RN  Outcome: Progressing  Flowsheets (Taken 8/5/2023 1937)  Maintains hematologic stability:   Assess for signs and symptoms of bleeding or hemorrhage   Monitor labs for bleeding or clotting disorders  8/5/2023 1003 by Maria Amaya RN  Outcome: Progressing     Problem: Skin/Tissue Integrity - Adult  Goal: Skin integrity remains intact  8/5/2023 2226 by Amparo Oates RN  Outcome: Progressing  Flowsheets (Taken 8/5/2023 1937)  Skin Integrity Remains Intact: Monitor for areas of redness and/or skin breakdown  8/5/2023 1003 by Maria Amaya RN  Outcome: Progressing  Flowsheets (Taken 8/5/2023 3504)  Skin Integrity Remains Intact: Monitor for areas of redness and/or skin breakdown  Goal: Incisions, wounds, or drain sites healing without S/S of infection  8/5/2023 2226 by Amparo Oates RN  Outcome: Progressing  Flowsheets (Taken 8/5/2023 1937)  Incisions, Wounds, or Drain Sites Healing Without Sign and Symptoms of Infection: ADMISSION and DAILY: Assess and document risk factors for pressure ulcer development  8/5/2023 1003 by Maria Amaya RN  Outcome: Progressing     Problem: Metabolic/Fluid and Electrolytes - Adult  Goal: Electrolytes maintained within normal limits  8/5/2023 2226 by Amparo Oates RN  Outcome: Progressing  Flowsheets (Taken 8/5/2023 1937)  Electrolytes maintained within normal limits:   Monitor labs and assess patient for signs and symptoms of electrolyte imbalances   Administer electrolyte replacement as ordered  8/5/2023 1003 by Maria Amaya RN  Outcome: Progressing  Goal: Hemodynamic stability and optimal renal function maintained  8/5/2023 2226 by Amparo Oates RN  Outcome: Progressing  8/5/2023 1003 by Maria Amaya RN  Outcome: Progressing     Problem: Respiratory - Adult  Goal: Achieves optimal ventilation and oxygenation  8/5/2023 2226 by Amparo Oates RN  Outcome: Progressing  8/5/2023 1003 by Maria Amaya RN  Outcome: Progressing

## 2023-08-07 NOTE — PROGRESS NOTES
IR OCCUPATIONAL THERAPY INITIAL EVALUATION    Time In 1305      Time Out 1400        Precautions: Falls and WBAT BLEs    Pain: Pt has no complaints of pain. History of Presenting Illness (per previous reports): Sola Love is a 60 y.o. male with medical history of RICCI, on CPAP, mild intermittent asthma, hypercholesterolemia, anxiety and vitamin D deficiency presented to ED after a fall. Found to have closed comminuted fracture of right distal femur. Ortho consulted and s/p right femur retrograde nail on 7/31. Also appears to have a Briseno fracture of the left foot. Ortho recommend walker boot. Rapid response called 8/1 morning and patient found to be in supraventricular tachycardia, heart rate was in the 170s.  6 mg of adenosine and 5 mg of metoprolol given. Patient had another episode of SVT next day. Echocardiogram unremarkable. Cardiology consulted and patient started on low-dose metoprolol with improvement in heart rate. SVT likely secondary to acute anemia. Patient noted with acute blood loss anemia, hemoglobin dropped to 6.6 postoperatively. Received PRBC transfusion on 8/2. Iron panel suggestive of iron deficiency anemia, started on p.o. iron supplements. Hospital course complicated with ROE, creatinine increased to 2 from 1 postoperatively. Patient with some urinary retention as well and Scott catheter was placed. ROE resolved with IV fluids. Successful voiding trial on 8/3 and catheter removed. All other chronic conditions stable and we continued essential home meds. No new complaint on the day of discharge. PT/OT recommend [facility]. Patient is stable for discharge to [facility] today with close follow-up with PCP and Ortho. Past Medical History/ Co-morbidities:  Past Medical History:   Diagnosis Date    Anxiety     High cholesterol     Sleep apnea        Patient's Goal: \"I want to move my leg better. \"    Prior Level of Function: Pt reports previous independence with ADLs, IADLs

## 2023-08-07 NOTE — DISCHARGE SUMMARY
Hospitalist Discharge Summary   Admit Date:  2023 12:33 PM   DC Note date: 2023  Name:  Claudia Clarke   Age:  60 y.o. Sex:  male  :  1960   MRN:  [id number]   Room:  [facility]  PCP:  Severiano Kindler, DO    Presenting Complaint: Knee Injury     Initial Admission Diagnosis: Closed comminuted intra-articular fracture of distal femur, right, initial encounter [facility]) [S72.491A]  Closed fracture of distal end of right femur, unspecified fracture morphology, initial encounter ([address]) [S72.401A]     Problem List for this Hospitalization (present on admission):    Principal Problem:    Closed comminuted intra-articular fracture of distal femur, right, initial encounter ([address])  Active Problems:    Closed fracture of right distal femur ([address])    Sleep apnea    Mild intermittent asthma    Anxiety    High cholesterol    ROE (acute kidney injury) ([address])    Blood loss anemia    SVT (supraventricular tachycardia) ([address])    Urinary retention  Resolved Problems:    * No resolved hospital problems. *      Hospital Course:  Claudia Clarke is a 60 y.o. male with medical history of RICCI, on CPAP, mild intermittent asthma, hypercholesterolemia, anxiety and vitamin D deficiency presented to ED after a fall. Found to have closed comminuted fracture of right distal femur. Ortho consulted and s/p right femur retrograde nail on . Also appears to have a Briseno fracture of the left foot. Ortho recommend walker boot. Rapid response called  morning and patient found to be in supraventricular tachycardia, heart rate was in the 170s.  6 mg of adenosine and 5 mg of metoprolol given. Patient had another episode of SVT next day. Echocardiogram unremarkable. Cardiology consulted and patient started on low-dose metoprolol with improvement in heart rate. SVT likely secondary to acute anemia. Patient noted with acute blood loss anemia, hemoglobin dropped to 6.6 postoperatively. Received PRBC transfusion on .   Iron panel

## 2023-08-08 LAB
GLUCOSE BLD STRIP.AUTO-MCNC: 109 MG/DL (ref 65–100)
SERVICE CMNT-IMP: ABNORMAL

## 2023-08-08 PROCEDURE — 1180000000 HC REHAB R&B

## 2023-08-08 PROCEDURE — 6370000000 HC RX 637 (ALT 250 FOR IP): Performed by: PHYSICAL MEDICINE & REHABILITATION

## 2023-08-08 PROCEDURE — 82962 GLUCOSE BLOOD TEST: CPT

## 2023-08-08 PROCEDURE — 94640 AIRWAY INHALATION TREATMENT: CPT

## 2023-08-08 PROCEDURE — 94660 CPAP INITIATION&MGMT: CPT

## 2023-08-08 PROCEDURE — 94760 N-INVAS EAR/PLS OXIMETRY 1: CPT

## 2023-08-08 PROCEDURE — 2580000003 HC RX 258: Performed by: PHYSICAL MEDICINE & REHABILITATION

## 2023-08-08 PROCEDURE — 97110 THERAPEUTIC EXERCISES: CPT

## 2023-08-08 PROCEDURE — 97535 SELF CARE MNGMENT TRAINING: CPT

## 2023-08-08 PROCEDURE — 97530 THERAPEUTIC ACTIVITIES: CPT

## 2023-08-08 PROCEDURE — 6360000002 HC RX W HCPCS: Performed by: PHYSICAL MEDICINE & REHABILITATION

## 2023-08-08 PROCEDURE — 97542 WHEELCHAIR MNGMENT TRAINING: CPT

## 2023-08-08 RX ORDER — SODIUM CHLORIDE 0.9 % (FLUSH) 0.9 %
5-40 SYRINGE (ML) INJECTION 2 TIMES DAILY
Status: DISCONTINUED | OUTPATIENT
Start: 2023-08-08 | End: 2023-08-10

## 2023-08-08 RX ADMIN — ATORVASTATIN CALCIUM 40 MG: 40 TABLET, FILM COATED ORAL at 09:29

## 2023-08-08 RX ADMIN — METOPROLOL TARTRATE 25 MG: 25 TABLET, FILM COATED ORAL at 09:29

## 2023-08-08 RX ADMIN — ACETAMINOPHEN 650 MG: 325 TABLET ORAL at 12:20

## 2023-08-08 RX ADMIN — FLUTICASONE PROPIONATE 1 PUFF: 110 AEROSOL, METERED RESPIRATORY (INHALATION) at 05:38

## 2023-08-08 RX ADMIN — FERROUS SULFATE TAB 325 MG (65 MG ELEMENTAL FE) 325 MG: 325 (65 FE) TAB at 17:40

## 2023-08-08 RX ADMIN — ENOXAPARIN SODIUM 40 MG: 100 INJECTION SUBCUTANEOUS at 09:32

## 2023-08-08 RX ADMIN — PAROXETINE HYDROCHLORIDE 20 MG: 20 TABLET, FILM COATED ORAL at 09:28

## 2023-08-08 RX ADMIN — SODIUM CHLORIDE, PRESERVATIVE FREE 10 ML: 5 INJECTION INTRAVENOUS at 21:25

## 2023-08-08 RX ADMIN — FLUTICASONE PROPIONATE 1 PUFF: 110 AEROSOL, METERED RESPIRATORY (INHALATION) at 18:07

## 2023-08-08 RX ADMIN — CLONAZEPAM 0.5 MG: 0.5 TABLET ORAL at 21:20

## 2023-08-08 RX ADMIN — METOPROLOL TARTRATE 25 MG: 25 TABLET, FILM COATED ORAL at 21:20

## 2023-08-08 RX ADMIN — FERROUS SULFATE TAB 325 MG (65 MG ELEMENTAL FE) 325 MG: 325 (65 FE) TAB at 09:29

## 2023-08-08 ASSESSMENT — PAIN SCALES - GENERAL
PAINLEVEL_OUTOF10: 0
PAINLEVEL_OUTOF10: 1

## 2023-08-08 ASSESSMENT — PAIN DESCRIPTION - DESCRIPTORS: DESCRIPTORS: ACHING

## 2023-08-08 ASSESSMENT — PAIN DESCRIPTION - LOCATION: LOCATION: NECK

## 2023-08-08 NOTE — CARE COORDINATION
08/08/23 0946   Service Assessment   Patient Orientation Alert and Oriented;Person;Place;Situation;Self   Cognition Alert   History Provided By Patient;Medical Record   Primary Caregiver Self   Support Systems Spouse/Significant Other   Patient's Healthcare Decision Maker is: Legal Next of Kin   PCP Verified by CM Yes   Last Visit to PCP Within last 3 months   Prior Functional Level Independent in ADLs/IADLs   Current Functional Level Assistance with the following:;Bathing;Dressing; Toileting;Mobility   Can patient return to prior living arrangement Yes   Ability to make needs known: Good   Family able to assist with home care needs: Yes   Would you like for me to discuss the discharge plan with any other family members/significant others, and if so, who? Yes   Financial Resources Medicare   Community Resources ECF/Home Care   Social/Functional History   Lives With Spouse   Type of Home Apartment   Home Layout One level   Home Access Level entry   Bathroom Shower/Tub Tub/Shower unit   Bathroom Toilet Standard   Bathroom Accessibility Accessible   Home Equipment None   Receives Help From Family;Home health   ADL Assistance Needs assistance   Toileting Needs assistance   Homemaking Assistance Needs assistance   Homemaking Responsibilities Yes   Ambulation Assistance Needs assistance   Transfer Assistance Needs assistance   Active  Yes   Mode of Transportation Car   Occupation Retired   Discharge Planning   Type of [address] Prior To Admission None   Potential Assistance Needed N/A   DME Ordered? No   Potential Assistance Purchasing Medications No   Type of Home Care Services None   Patient expects to be discharged to: House   One/Two Story Residence One story   History of falls?  1   Services At/After Discharge   Transition of Care Consult (CM Consult) N/A   Services At/After Discharge None   [employer]

## 2023-08-08 NOTE — PROGRESS NOTES
OT Daily Note  Time In:    1114     Time Out: 1200        Subjective: \"I am a little light sensitive. \"  Pain: indicated at end of session, requested pain med from RN  Education: BUE strengthening and [facility]  Interdisciplinary Communication: with PT regarding pt's home support     Mobility   Score Comments   Sit to Stand CGA    Transfer Assist CGA SPT with RW     Activity tolerance & Strengthening    Pt completed 10 minutes on the ergometer frontwards and backwards with medium resistance to increase UB strength and activity tolerance for integration into functional transfers. FM and hand strengthening   Medium heavy resistance theraputty used to find 20 out of 20 beads in various methods to increase bilateral UE/ strength and activity tolerance. Pt then used rolling pin to flatten theraputty for additional BUE strengthening. Pt then replaced beads back into theraputty with appropriate finger strength and [facility] skills. Pt engaged in hand strengthening and reaching activity, manipulating light to heavy resistance large clothespins up a vertical ladder above shoulder height. Pt demonstrated appropriate strength and reaching skills throughout activity. Pt was additionally instructed to place clothespins in color pattern. Assessment: Pt presents with impairments in cognition, suspect this may be his baseline. Progressing in UB strength. Plan: Continue OT POC.      Loco Miller, LONG   8/8/2023

## 2023-08-08 NOTE — PROGRESS NOTES
08/07/23 2102   NIV Type   $NIV $Daily Charge   NIV Started/Stopped On   Equipment Type Resmed   Mode Auto-PAP   Mask Type Under the nose   Mask Size Medium   Assessment   Pulse 81   Respirations 18   SpO2 96 %   Comfort Level Good   Using Accessory Muscles No   Mask Compliance Good   Settings/Measurements   PIP Observed 8 cm H20   EPAP Min 6 cmH2O   EPAP Max 12 cmH2O   Mask Leak (lpm)   (mask fits good)   Patient's Home Machine No   Alarm Settings   Alarms On Y   Apnea (secs) 20 secs

## 2023-08-08 NOTE — PROGRESS NOTES
OT DAILY NOTE    Time In:    0071     Time Out: 0830       Functional Mobility   Score Comments   Supine to Sit 3: Partial/Moderate A Assist with RLE   Sit to Stand 3: Partial/Moderate A Alfredo   Transfer Assist 4: Supervision or touching A CGA with SPT using RW     Activities of Daily Living   Score Comments   Eating Independent     Oral Hygiene Independent     Bathing Supervision or touching assistance CGA in stance with RW while pt washed bottom and víctor area   Upper Body  Dressing Setup or clean-up assistance     Lower Body Dressing Substantial/maximal assistance assist to thread pants, pt able to pull over waist with cuing for safety and technique   [place] Footwear Dependent  Assist with socks, R knee immobilizer and L boot       Summary of Session: S: \"I like to meditate. It really helps me. \" Agreeable to therapy. Focus of session was on morning ADL routine. Patient was able to complete SPT using a RW with CGA. Pain tolerable in BLEs. Collaborated with PT and confirmed patient is on track to reach goals as documented in the care plan. Continue OT POC with focus on ADL/IADL skills, functional transfers, functional mobility, coordination, strength, static and dynamic balance, and activity tolerance to maximize safety and independence with ADLs and functional transfers. Patient ended session in wc with PT assuming care.       Loco Ash, OT  8/8/2023

## 2023-08-08 NOTE — PROGRESS NOTES
Inpatient Rehab Daily Progress Note    P      Admit Date: 8/7/2023      Chief Complaint : Impaired ability to ambulate, transfer, and carryout self care tasks d/t a right distal femur fracture post ORIF in a knee immobilizer and and left 5th metatarsal fracture, both injuries WBAT. Admitting Diagnosis:   History of femur fracture [Z87.81]    Secondary diagnosis:  Post op pain  Asthma  HLD  Anxiety  RICCI on a CPAP      Date of Evaluation: August 8, 2023    Daily/Subjective: Pt seen and evaluated today. NAEO and he reports sleeping fine. He is eating and drinking well, and continent of bowel and bladder. His pain is reasonably controlled on current medications and he has been educated on peritherapy dosing as needed. He is starting therapies today.          Current Facility-Administered Medications   Medication Dose Route Frequency    acetaminophen (TYLENOL) suppository 650 mg  650 mg Rectal Q6H PRN    atorvastatin (LIPITOR) tablet 40 mg  40 mg Oral Daily    Benzocaine-Menthol (CEPACOL SORE THROAT) 15-2.6 MG lozenge 1 lozenge  1 lozenge Oral Q2H PRN    calcium carbonate (TUMS) chewable tablet 500 mg  500 mg Oral TID PRN    clonazePAM (KLONOPIN) tablet 0.5 mg  0.5 mg Oral Nightly    dextrose 10 % infusion   IntraVENous Continuous PRN    dextrose bolus 10% 125 mL  125 mL IntraVENous PRN    Or    dextrose bolus 10% 250 mL  250 mL IntraVENous PRN    ferrous sulfate (IRON 325) tablet 325 mg  325 mg Oral BID WC    glucose chewable tablet 16 g  4 tablet Oral PRN    glucagon (rDNA) injection 1 mg  1 mg SubCUTAneous PRN    metoprolol tartrate (LOPRESSOR) tablet 50 mg  50 mg Oral Q6H PRN    metoprolol tartrate (LOPRESSOR) tablet 25 mg  25 mg Oral BID    ondansetron (ZOFRAN-ODT) disintegrating tablet 4 mg  4 mg Oral Q8H PRN    Or    ondansetron (ZOFRAN) injection 4 mg  4 mg IntraVENous Q6H PRN    polyethylene glycol (GLYCOLAX) packet 17 g  17 g Oral Daily PRN    PARoxetine (PAXIL) tablet 20 mg  20 mg Oral Daily admission without neurogenic bladder or OAB. Bowel program - at risk for constipation as a side effect of opioids, other medications, impaired mobility, etc. MiraLAX daily for regularity, Senokot-S for stool softener + laxative. PRN MOM, bisacodyl suppository or tablets for constipation. Pt reports normal bowel function on IPR admission without neurogenic bowel or constipation. Disposition: The patient's prognosis for significant practical improvement within a reasonable period of time appears good and the estimated length of stay is 10 days; patient is expected to return home with spouse / family supervision and continued rehabilitation with home health therapy. Given the patient's complex neurologic / medical condition, risks of further medical complications include but are not limited to: thromboembolism / pulmonary embolism, skin breakdown, pneumonia due to decreased mobility, CVA, MI, cardiac arrhythmias due to HTN, postural hypotension, infection, uncontrolled pain, respiratory compromise/failure. For these ongoing medical issues, rehabilitation services could not be safely provided at a lower level of care such as a skilled nursing facility or nursing home. Discharge: Home with wife, date TBD at team conference. Follow Up:  1. Dr. Marivel Loomis (PCP) 1-2 weeks post IPR  2.  Dr. Tanya Ovalles MD (Orthopedics) per his f/u scheduling    Signed By: Werner Healy MD     August 8, 2023

## 2023-08-09 PROBLEM — Z74.09 IMPAIRED FUNCTIONAL MOBILITY, BALANCE, GAIT, AND ENDURANCE: Status: ACTIVE | Noted: 2023-07-31

## 2023-08-09 PROBLEM — Z78.9 DECREASED INDEPENDENCE WITH ACTIVITIES OF DAILY LIVING: Status: ACTIVE | Noted: 2023-07-31

## 2023-08-09 PROBLEM — Z51.89 ENCOUNTER FOR REHABILITATION: Status: ACTIVE | Noted: 2023-08-07

## 2023-08-09 PROCEDURE — 97110 THERAPEUTIC EXERCISES: CPT

## 2023-08-09 PROCEDURE — 6370000000 HC RX 637 (ALT 250 FOR IP): Performed by: PHYSICAL MEDICINE & REHABILITATION

## 2023-08-09 PROCEDURE — 97530 THERAPEUTIC ACTIVITIES: CPT

## 2023-08-09 PROCEDURE — 94760 N-INVAS EAR/PLS OXIMETRY 1: CPT

## 2023-08-09 PROCEDURE — 97535 SELF CARE MNGMENT TRAINING: CPT

## 2023-08-09 PROCEDURE — 94640 AIRWAY INHALATION TREATMENT: CPT

## 2023-08-09 PROCEDURE — 97542 WHEELCHAIR MNGMENT TRAINING: CPT

## 2023-08-09 PROCEDURE — 1180000000 HC REHAB R&B

## 2023-08-09 PROCEDURE — 6360000002 HC RX W HCPCS: Performed by: PHYSICAL MEDICINE & REHABILITATION

## 2023-08-09 RX ADMIN — CLONAZEPAM 0.5 MG: 0.5 TABLET ORAL at 21:20

## 2023-08-09 RX ADMIN — FERROUS SULFATE TAB 325 MG (65 MG ELEMENTAL FE) 325 MG: 325 (65 FE) TAB at 17:48

## 2023-08-09 RX ADMIN — FERROUS SULFATE TAB 325 MG (65 MG ELEMENTAL FE) 325 MG: 325 (65 FE) TAB at 08:27

## 2023-08-09 RX ADMIN — FLUTICASONE PROPIONATE 1 PUFF: 110 AEROSOL, METERED RESPIRATORY (INHALATION) at 17:32

## 2023-08-09 RX ADMIN — FLUTICASONE PROPIONATE 1 PUFF: 110 AEROSOL, METERED RESPIRATORY (INHALATION) at 05:48

## 2023-08-09 RX ADMIN — METOPROLOL TARTRATE 25 MG: 25 TABLET, FILM COATED ORAL at 08:27

## 2023-08-09 RX ADMIN — ENOXAPARIN SODIUM 40 MG: 100 INJECTION SUBCUTANEOUS at 08:27

## 2023-08-09 RX ADMIN — ACETAMINOPHEN 650 MG: 325 TABLET ORAL at 22:11

## 2023-08-09 RX ADMIN — PAROXETINE HYDROCHLORIDE 20 MG: 20 TABLET, FILM COATED ORAL at 08:27

## 2023-08-09 RX ADMIN — ACETAMINOPHEN 650 MG: 325 TABLET ORAL at 10:04

## 2023-08-09 RX ADMIN — METOPROLOL TARTRATE 25 MG: 25 TABLET, FILM COATED ORAL at 21:19

## 2023-08-09 RX ADMIN — ATORVASTATIN CALCIUM 40 MG: 40 TABLET, FILM COATED ORAL at 08:27

## 2023-08-09 ASSESSMENT — PAIN - FUNCTIONAL ASSESSMENT: PAIN_FUNCTIONAL_ASSESSMENT: ACTIVITIES ARE NOT PREVENTED

## 2023-08-09 ASSESSMENT — PAIN DESCRIPTION - DESCRIPTORS
DESCRIPTORS: ACHING;THROBBING
DESCRIPTORS: ACHING

## 2023-08-09 ASSESSMENT — PAIN DESCRIPTION - ORIENTATION: ORIENTATION: RIGHT

## 2023-08-09 ASSESSMENT — PAIN DESCRIPTION - LOCATION: LOCATION: LEG

## 2023-08-09 ASSESSMENT — PAIN SCALES - GENERAL
PAINLEVEL_OUTOF10: 3
PAINLEVEL_OUTOF10: 0
PAINLEVEL_OUTOF10: 0
PAINLEVEL_OUTOF10: 2

## 2023-08-09 NOTE — PROGRESS NOTES
OT Daily Note  Time In:    1116   1300  Time Out: 1200   1344    Subjective: Pt agreeable to therapy  Pain: not expressed  Education: activity tolerance and UB strengthening   Interdisciplinary Communication: with IP team during team conference    Mobility   Score Comments   Sit to Stand Touching/supervision     Transfer Assist CGA SPT with RW     UB strengthening, reaching, Cognition   Pt completed a 55 piece jigsaw puzzle to increase UE strength, visual perceptual skills, coordination, and problem solving. Pt completed puzzle without assistance with 1 lb wrist weights donned. Strengthening   Pt completed x10 reps x2 sets x35 lbs using rickshaw for UB strengthening carryover into functional transfers. Pt completed 10 minutes on the ergometer frontwards and backwards with medium resistance to increase UB strength and activity tolerance for integration into functional transfers. Cognition   Pt completed visual perceptual, reaching, strengthening, activity tolerance, and bilateral hand coordination task utilizing Rubber Band Board while following visual design. Pt with appropriate coordination skills noted while placing rubber bands onto design at midline. Pt with functional reaching skills as well. Pt completed task independently with 0 errors. Pt engaged with pipetree to build UB activity tolerance and problem solving abilities for carryover into IADLs. Assessment: Progressing well in UB strength, endurance, and functional transfers. Plan: Continue OT POC.      Loco Bhandari, OT   8/9/2023

## 2023-08-09 NOTE — PROGRESS NOTES
PHYSICAL THERAPY DAILY NOTE  Time In:  7535  Time Out:  1120  Total Treatment Time:  48 Minutes  Pt. Seen for: AM, Transfer Training, and Wheelchair mobility     Subjective: Pt. Had pictures of his w/c & bed room for PT to assess. Discussed ways to get in/out of his bed assuming the w/c will fit in the doorway. Objective:  Precautions: Falls and WB: WBAT R LE w/ KI, WBAT L LE w/ walking boot    GROSS ASSESSMENT Daily Assessment    Pt's w/c @ home is a Drive w/c w/ elevating legrest.  Recommend pt's wife walk it through the apartment to ensure it will fit through doorways. COGNITION Daily Assessment    Pt. Presents w/ delayed processing & anxiety about d/c home       BED/MAT MOBILITY Daily Assessment   Worked on getting in.out of bed (mat) from the foot to simulate transfer required @ home. Utilized 6\" step stool @ foot of bed to improve mechanics of pushing on to bed w/ L LE Rolling Right: NT  Rolling Left: NT  Supine to Sit: Supervision/Standby Assist  Sit to Supine: Min A for R LE       TRANSFERS Daily Assessment   Supervision for VCs Sit to Stand: Supervision/Standby Assist  Stand to Sit: Supervision/Standby Assist  Transfer Type: Stand Pivot w/ RW  Transfer Assistance: Supervision/Standby Assist  Car Transfers: NT         GAIT Daily Assessment   NA        STEPS/STAIRS Daily Assessment   NA        BALANCE Daily Assessment    Static Sitting: Good:  Pt. able to maintain balance w/o UE support;  exhibits some postural sway  Dynamic Sitting: Good - accepts moderate challenge;  can maintain balance while picking object off the floor  Static Standing: Fair:  Pt. requires UE support;  may need occasional min A  Dynamic Standing: Fair - accepts minimal challenge;  can maintain balance while turning head/trunk       WHEELCHAIR MOBILITY Daily Assessment   Worked on w/c propulsion simulating turns from hallways to bedroom.   Pt. Performed w/ supervision for cues to avoid obstacles & to problem solve how to

## 2023-08-09 NOTE — PLAN OF CARE
Problem: Safety - Adult  Goal: Free from fall injury  8/9/2023 0712 by Joselyn Schroeder RN  Outcome: Progressing  8/8/2023 2204 by Alexis Love RN  Outcome: Progressing

## 2023-08-09 NOTE — PROGRESS NOTES
RT placed patient on CPAP. Patient is resting comfortably and in no distress on the below settings.  RT notified patients RN.         08/08/23 1180   NIV Type   $NIV $Daily Charge   NIV Started/Stopped On   Equipment Type Resmed   Mode Auto-PAP   Mask Type Under the nose   Mask Size Medium   Assessment   Pulse 79   Respirations 17   SpO2 97 %   Comfort Level Good   Using Accessory Muscles No   Mask Compliance Good   Skin Assessment Clean, dry, & intact   Skin Protection for O2 Device No   Settings/Measurements   PIP Observed 8 cm H20   FiO2  21 %   Patient's Home Machine No   Alarm Settings   Alarms On Y

## 2023-08-09 NOTE — PROGRESS NOTES
PHYSICAL THERAPY DAILY NOTE  Time In:  0393  Time Out:  1448  Total Treatment Time:  51 Minutes  Pt. Seen for: PM, Therapeutic Exercise, Transfer Training, and Wheelchair mobility     Subjective: Pt. Reports his wife was seen in the ED this morning due to difficulty breathing. States she is back home now. Objective:  Precautions: Falls and WB: WBAT w/ KI for transfers only R LE , WBAT w/ walking boot L LE    COGNITION Daily Assessment    Pt. Exhibits difficulty w/ problem solving & requires repetition to learn new tasks. BED/MAT MOBILITY Daily Assessment   Pt. Managing R LE holding on to KI Rolling Right: NT  Rolling Left: NT  Supine to Sit: Supervision/Standby Assist  Sit to Supine: Supervision/Standby Assist       TRANSFERS Daily Assessment    Sit to Stand: Supervision/Standby Assist  Stand to Sit: Supervision/Standby Assist  Transfer Type: Stand Pivot w/ RW  Transfer Assistance: Supervision/Standby Assist  Car Transfers: NT         GAIT Daily Assessment   NA        STEPS/STAIRS Daily Assessment   NA        BALANCE Daily Assessment    Static Sitting: Normal:  Pt. able to maintain balance w/o UE support  Dynamic Sitting: Good - accepts moderate challenge;  can maintain balance while picking object off the floor  Static Standing: Good:  Pt. able to maintain balance w/o UE support;  exhibits some postural sway  Dynamic Standing: Fair - accepts minimal challenge;  can maintain balance while turning head/trunk       WHEELCHAIR MOBILITY Daily Assessment    Able to Propel (ft): 150'  Assistance: Supervision/Standby Assist  Surface: Level Surface  Wheelchair Set-up: Manages All Parts, Manages R Brake, and Manages L Footrest       LOWER EXTREMITY EXERCISES Daily Assessment   Pt.  Performed supine LE exercises to increase strength & endurance SUPINE EXERCISES Sets Reps Comments   Ankle Pumps 1 15    Quad Sets 1 15    Glut Sets 1 15    Heel Slides 1 15 L LE only   Hip Abduction 1 15 AAROM R LE   Hip IR 1 15

## 2023-08-09 NOTE — PLAN OF CARE
Problem: Respiratory - Adult  Goal: Achieves optimal ventilation and oxygenation  Outcome: Progressing  Flowsheets (Taken 8/9/2023 2414)  Achieves optimal ventilation and oxygenation:   Assess for changes in respiratory status   Position to facilitate oxygenation and minimize respiratory effort   Respiratory therapy support as indicated   Assess for changes in mentation and behavior   Oxygen supplementation based on oxygen saturation or arterial blood gases   Encourage broncho-pulmonary hygiene including cough, deep breathe, incentive spirometry   Assess and instruct to report shortness of breath or any respiratory difficulty

## 2023-08-09 NOTE — PROGRESS NOTES
OT DAILY NOTE    Time In:    7798     Time Out: 0832       Functional Mobility   Score Comments   Supine to Sit 4: Supervision or touching A Cuing for technique managing RLE   Sit to Stand 4: Supervision or touching A    Transfer Assist 4: Supervision or touching A SPT, CGA with RW     Activities of Daily Living   Score Comments   Eating Independent     Oral Hygiene Independent     Bathing Supervision or touching assistance SBA in stance with RW while pt washed bottom, cuing for sequencing throughout   Upper Body  Dressing Setup or clean-up assistance     Lower Body Dressing Partial/moderate assistance educated on use of reacher for threading underwear and short. min A for pulling over velco on KI. SBA in stance while pt pulled clothing over waist   Donning/[place] Footwear Dependent     Toilet Transfer Partial/moderate assistance Vipul SPT   2 Heth [place]en[place] assistance Assist for balance at grab bar, cues for problem solving       Summary of Session: S: \"I wonder if I am going to be able to fit into my car. \" Agreeable to therapy. Focus of session was on morning ADL routine. Pt completed 10 minutes on the ergometer frontwards and backwards with medium resistance to increase UB strength and activity tolerance for integration into functional transfers. Pain not expressed. Collaborated with PT and confirmed patient is on track to reach goals as documented in the care plan. Continue OT POC with focus on ADL/IADL skills, functional transfers, functional mobility, coordination, strength, static and dynamic balance, and activity tolerance to maximize safety and independence with ADLs and functional transfers. Patient ended session in recliner with call remote and phone within reach.        Loco Davenport OT  8/9/2023

## 2023-08-10 PROCEDURE — 94760 N-INVAS EAR/PLS OXIMETRY 1: CPT

## 2023-08-10 PROCEDURE — 97542 WHEELCHAIR MNGMENT TRAINING: CPT

## 2023-08-10 PROCEDURE — 97110 THERAPEUTIC EXERCISES: CPT

## 2023-08-10 PROCEDURE — 6360000002 HC RX W HCPCS: Performed by: PHYSICAL MEDICINE & REHABILITATION

## 2023-08-10 PROCEDURE — 1180000000 HC REHAB R&B

## 2023-08-10 PROCEDURE — 97535 SELF CARE MNGMENT TRAINING: CPT

## 2023-08-10 PROCEDURE — 6370000000 HC RX 637 (ALT 250 FOR IP): Performed by: PHYSICAL MEDICINE & REHABILITATION

## 2023-08-10 PROCEDURE — 94660 CPAP INITIATION&MGMT: CPT

## 2023-08-10 PROCEDURE — 94640 AIRWAY INHALATION TREATMENT: CPT

## 2023-08-10 PROCEDURE — 97530 THERAPEUTIC ACTIVITIES: CPT

## 2023-08-10 RX ADMIN — METOPROLOL TARTRATE 25 MG: 25 TABLET, FILM COATED ORAL at 20:55

## 2023-08-10 RX ADMIN — ACETAMINOPHEN 650 MG: 325 TABLET ORAL at 06:51

## 2023-08-10 RX ADMIN — FERROUS SULFATE TAB 325 MG (65 MG ELEMENTAL FE) 325 MG: 325 (65 FE) TAB at 07:30

## 2023-08-10 RX ADMIN — ENOXAPARIN SODIUM 40 MG: 100 INJECTION SUBCUTANEOUS at 08:22

## 2023-08-10 RX ADMIN — PAROXETINE HYDROCHLORIDE 20 MG: 20 TABLET, FILM COATED ORAL at 07:30

## 2023-08-10 RX ADMIN — ACETAMINOPHEN 650 MG: 325 TABLET ORAL at 20:55

## 2023-08-10 RX ADMIN — FLUTICASONE PROPIONATE 1 PUFF: 110 AEROSOL, METERED RESPIRATORY (INHALATION) at 06:03

## 2023-08-10 RX ADMIN — METOPROLOL TARTRATE 25 MG: 25 TABLET, FILM COATED ORAL at 07:30

## 2023-08-10 RX ADMIN — FERROUS SULFATE TAB 325 MG (65 MG ELEMENTAL FE) 325 MG: 325 (65 FE) TAB at 17:05

## 2023-08-10 RX ADMIN — FLUTICASONE PROPIONATE 1 PUFF: 110 AEROSOL, METERED RESPIRATORY (INHALATION) at 16:14

## 2023-08-10 RX ADMIN — ATORVASTATIN CALCIUM 40 MG: 40 TABLET, FILM COATED ORAL at 07:30

## 2023-08-10 RX ADMIN — CLONAZEPAM 0.5 MG: 0.5 TABLET ORAL at 20:55

## 2023-08-10 ASSESSMENT — PAIN SCALES - GENERAL
PAINLEVEL_OUTOF10: 3
PAINLEVEL_OUTOF10: 3

## 2023-08-10 ASSESSMENT — PAIN DESCRIPTION - DESCRIPTORS
DESCRIPTORS: ACHING
DESCRIPTORS: ACHING

## 2023-08-10 ASSESSMENT — PAIN DESCRIPTION - LOCATION
LOCATION: LEG
LOCATION: LEG

## 2023-08-10 ASSESSMENT — PAIN DESCRIPTION - ORIENTATION
ORIENTATION: RIGHT
ORIENTATION: RIGHT;LEFT

## 2023-08-10 ASSESSMENT — PAIN - FUNCTIONAL ASSESSMENT: PAIN_FUNCTIONAL_ASSESSMENT: ACTIVITIES ARE NOT PREVENTED

## 2023-08-10 NOTE — PROGRESS NOTES
OT DAILY NOTE    Time In:    0830     Time Out: 0925       Functional Mobility   Score Comments   Supine to Sit 3: Partial/Moderate A Assist with RLE   Sit to Stand 4: Supervision or touching A    Transfer Assist 4: Supervision or touching A CGA with SPT using RW     Activities of Daily Living   Score Comments   Eating Independent     Oral Hygiene Independent     Bathing Supervision or touching assistance SBA in stance with RW while pt washed bottom, cuing for sequencing throughout   Upper Body  Dressing Setup or clean-up assistance     Lower Body Dressing Partial/moderate assistance educated on use of reacher for threading underwear and short. min A for pulling over velco on KI. SBA in stance while pt pulled clothing over waist   Donning/[place] Footwear Dependent secondary to time constraints       Summary of Session: S: \"I want to wash my right leg. \" Agreeable to therapy. Focus of session was on morning ADL routine. Patient was able to complete SPT using a RW with CGA. Pain not expressed. Collaborated with PT and confirmed patient is on track to reach goals as documented in the care plan. Continue OT POC with focus on ADL/IADL skills, functional transfers, functional mobility, coordination, strength, static and dynamic balance, and activity tolerance to maximize safety and independence with ADLs and functional transfers. Patient ended session in recliner with call remote and phone within reach.        Loco Persaud, OT  8/10/2023

## 2023-08-10 NOTE — PROGRESS NOTES
PHYSICAL THERAPY DAILY NOTE  Time In:  1350  Time Out:  1438  Total Treatment Time:  48 Minutes  Pt. Seen for: PM, Transfer Training, and Wheelchair mobility     Subjective: \"I don't think my wife could put the wheel chair into the back of her car. \"         Objective:  Precautions: Falls and WB: WBAT R LE w/ knee immobilizer on for transfers only, L LE WBAT with walking boot on    COGNITION Daily Assessment    Pt. Requires increased time to process, benefits from visual demonstration when providing new information       BED/MAT MOBILITY Daily Assessment    Rolling Right: NT  Rolling Left: NT  Supine to Sit: NT  Sit to Supine: Supervision/Standby Assist increased time requires, Vcs to problem solve how to manage R LE       TRANSFERS Daily Assessment   Worked on car transfers using rehab car, simulating a bench seat to get in the backseat of the car Sit to Stand: Supervision/Standby Assist  Stand to Sit: Supervision/Standby Assist  Transfer Type: Stand Pivot w/ RW  Transfer Assistance: Supervision/Standby Assist  Car Transfers: Supervision/Standby Assist         GAIT Daily Assessment   NA        STEPS/STAIRS Daily Assessment   NA        BALANCE Daily Assessment    Static Sitting: Normal:  Pt. able to maintain balance w/o UE support  Dynamic Sitting: Good - accepts moderate challenge;  can maintain balance while picking object off the floor  Static Standing: Fair:  Pt. requires UE support;  may need occasional min A  Dynamic Standing: Fair - accepts minimal challenge;  can maintain balance while turning head/trunk       WHEELCHAIR MOBILITY Daily Assessment   Pt. Worked on propulsion up/down ramp.   Pt. Propelled up 10' ramp using forwards technique w/ min-mod A & propelled up 10' ramp using backwards technique w/ CGA-S, sing B Ues & L LE Able to Propel (ft): 150'x2  Assistance: Supervision/Standby Assist  Surface: Level Surface  Wheelchair Set-up: Manages R Brake, Manages L Brake, Manages R Footrest, and Manages L

## 2023-08-10 NOTE — PROGRESS NOTES
PHYSICAL THERAPY DAILY NOTE  Time In:  1036  Time Out:  1116  Total Treatment Time:  40 Minutes  Pt. Seen for: AM, Therapeutic Exercise, Transfer Training, and Wheelchair mobility     Subjective: \"What do I do if I end up on the floor? \"         Objective:  Precautions: Falls and WB: WBAT R LE w/ KI for transfers only, WBAT L LE w/ fracture boot only    GROSS ASSESSMENT Daily Assessment    Pt. Sitting on [address] upon arrival       COGNITION Daily Assessment    Pt. Exhibits delayed processing & decreased problem solving ability       BED/MAT MOBILITY Daily Assessment    Rolling Right: NT  Rolling Left: NT  Supine to Sit: Supervision/Standby Assist  Sit to Supine: Supervision/Standby Assist       TRANSFERS Daily Assessment    Sit to Stand: Supervision/Standby Assist  Stand to Sit: Supervision/Standby Assist  Transfer Type: Stand Pivot w/ RW  Transfer Assistance: Supervision/Standby Assist  Car Transfers: NT         GAIT Daily Assessment   NA        STEPS/STAIRS Daily Assessment   NA        BALANCE Daily Assessment    Static Sitting: Normal:  Pt. able to maintain balance w/o UE support  Dynamic Sitting: Good - accepts moderate challenge;  can maintain balance while picking object off the floor  Static Standing: Fair:  Pt. requires UE support;  may need occasional min A  Dynamic Standing: Fair - accepts minimal challenge;  can maintain balance while turning head/trunk       WHEELCHAIR MOBILITY Daily Assessment    Able to Propel (ft): 150  Assistance: Supervision/Standby Assist  Surface: Level Surface  Wheelchair Set-up: Manages All Parts, Manages R Brake, and Manages L Footrest       LOWER EXTREMITY EXERCISES Daily Assessment   Pt.  Performed supine LE exercises to increase strength & endurance B LEs SUPINE EXERCISES Sets Reps Comments   Ankle Pumps 1 15     Quad Sets 1 15     Glut Sets 1 15     Ham sets 1 15    Heel Slides 1 15 L LE only   Hip Abduction 1 15 R LE w/ skate & board   Hip IR 1 15     Straight Leg Raise 1 15 L

## 2023-08-10 NOTE — PROGRESS NOTES
Patient is currently on CPAP. No respiratory distress is noted. Patient will be monitored .         08/09/23 3944   NIV Type   NIV Started/Stopped On   Equipment Type Res,ed   Mode Auto-PAP   Mask Type Under the nose   Mask Size Medium   Assessment   Pulse 87   Respirations 18   SpO2 96 %   Comfort Level Good   Using Accessory Muscles No   Mask Compliance Good   Skin Assessment Clean, dry, & intact   Skin Protection for O2 Device N/A   Settings/Measurements   EPAP Min 6 cmH2O   EPAP Max 12 cmH2O   FiO2  21 %   Mask Leak (lpm)   (mask fits well)   Patient's Home Machine No

## 2023-08-10 NOTE — CARE COORDINATION
Interdisciplinary Wednesday, Team Conference Meeting Notes    Interdisciplinary team conference meeting completed to discuss plan of care. Estimated D/C Date: 8/18/23    Recommended Follow-Up Therapy: Staff has recommended [facility]) PT/OT/Aide/SW / family training / DME's (?) wheelchair. Communication with family/caregivers: Patient needs are continue to be followed by Dr. Jeancarlos Curran. Patient has discharge date / plan at this time scheduled for 8/18/23. CM made patient aware of the recommendations that were discussed in the Team Conference Meeting. CM provided patient the discharge date / recommendations for [facility]) / patient was agreeable to [facility] and requested a referral be made to [facility]. Referral completed. spouse will need to do set a date for family training / TBA for DME's at this time. CM will continue to follow and remain available for any needs, concerns or questions that may arise. Name of [address] [place] [address] / [employer] PREFERRED PPO  LOS: 2 days  D/C Date: 8/18/23  Fax: No fax   Policy#: [id number]  Auth#:      CM met with patient and made patient aware of the recommendations that were discussed in the Team Conference. CM provide patient the discharge date 8/18/23. [facility]) recommended and patient was agreeable. Patient states he's still looking into agencies for assistance in the home with him / spouse. Patient has been provided Senior Booklet for resource and handouts for Alright at Home / [facility]. CM will continue to follow and remain available for any needs, concerns or questions that may arise.

## 2023-08-10 NOTE — PROGRESS NOTES
Inpatient Rehab Daily Progress Note    P      Admit Date: 8/7/2023      Chief Complaint : Impaired ability to ambulate, transfer, and carryout self care tasks d/t a right distal femur fracture post ORIF in a knee immobilizer and and left 5th metatarsal fracture, both injuries WBAT. Admitting Diagnosis:   History of femur fracture [Z87.81]    Secondary diagnosis:  Post op pain  Asthma  HLD  Anxiety  RICCI on a CPAP      Date of Evaluation: August 10, 2023    Daily/Subjective: Pt seen and evaluated today. JODY and he reports sleeping fine. He is eating and drinking well, and continent of bowel and bladder. His pain is reasonably controlled on current medications and he has been educated on peritherapy dosing as needed. He said therapies are going OK. OT doing seated bath d/t knee immobilizer.          Current Facility-Administered Medications   Medication Dose Route Frequency    acetaminophen (TYLENOL) suppository 650 mg  650 mg Rectal Q6H PRN    atorvastatin (LIPITOR) tablet 40 mg  40 mg Oral Daily    Benzocaine-Menthol (CEPACOL SORE THROAT) 15-2.6 MG lozenge 1 lozenge  1 lozenge Oral Q2H PRN    calcium carbonate (TUMS) chewable tablet 500 mg  500 mg Oral TID PRN    clonazePAM (KLONOPIN) tablet 0.5 mg  0.5 mg Oral Nightly    dextrose 10 % infusion   IntraVENous Continuous PRN    dextrose bolus 10% 125 mL  125 mL IntraVENous PRN    Or    dextrose bolus 10% 250 mL  250 mL IntraVENous PRN    ferrous sulfate (IRON 325) tablet 325 mg  325 mg Oral BID WC    glucose chewable tablet 16 g  4 tablet Oral PRN    glucagon (rDNA) injection 1 mg  1 mg SubCUTAneous PRN    metoprolol tartrate (LOPRESSOR) tablet 50 mg  50 mg Oral Q6H PRN    metoprolol tartrate (LOPRESSOR) tablet 25 mg  25 mg Oral BID    ondansetron (ZOFRAN-ODT) disintegrating tablet 4 mg  4 mg Oral Q8H PRN    Or    ondansetron (ZOFRAN) injection 4 mg  4 mg IntraVENous Q6H PRN    polyethylene glycol (GLYCOLAX) packet 17 g  17 g Oral Daily PRN    PARoxetine

## 2023-08-10 NOTE — PLAN OF CARE
Problem: Safety - Adult  Goal: Free from fall injury  8/10/2023 0713 by Funmilayo Mercado RN  Outcome: Progressing  8/9/2023 2011 by Gerard Dixon.  Ivan RN  Outcome: Progressing

## 2023-08-10 NOTE — PROGRESS NOTES
OT Daily Note  Time In:    1116     Time Out: 1157        Subjective: \"I wonder if my wife can help me put these leg braces on. \"  Pain: denied  Education: BUE strengthening   Interdisciplinary Communication: with PT during handoff     Mobility   Score Comments   Sit to Stand SBA    Transfer Assist CGA SPT with RW     Strengthening   Pt completed 12 minutes on the ergometer frontwards and backwards with medium resistance to increase UB strength and activity tolerance for integration into functional transfers. Pt completed BUE strengthening exercises using a 5 lb dowel bar while seated in wc x10 to 15 reps each: bicep curls, chest press, dead lift, rows, PNF D1 patterns, arm circles, shoulder IR and ER, and shoulder abduction/digs. Pt required short rest breaks throughout. Assessment: Progressing well, will schedule family training with pt's wife for assistance with ARMANI and L boot. Plan: Continue OT POC.      Loco Childress, LONG   8/10/2023

## 2023-08-11 PROCEDURE — 94761 N-INVAS EAR/PLS OXIMETRY MLT: CPT

## 2023-08-11 PROCEDURE — 97530 THERAPEUTIC ACTIVITIES: CPT

## 2023-08-11 PROCEDURE — 94640 AIRWAY INHALATION TREATMENT: CPT

## 2023-08-11 PROCEDURE — 97110 THERAPEUTIC EXERCISES: CPT

## 2023-08-11 PROCEDURE — 1180000000 HC REHAB R&B

## 2023-08-11 PROCEDURE — 6370000000 HC RX 637 (ALT 250 FOR IP): Performed by: PHYSICAL MEDICINE & REHABILITATION

## 2023-08-11 PROCEDURE — 94760 N-INVAS EAR/PLS OXIMETRY 1: CPT

## 2023-08-11 PROCEDURE — 6360000002 HC RX W HCPCS: Performed by: PHYSICAL MEDICINE & REHABILITATION

## 2023-08-11 PROCEDURE — 97535 SELF CARE MNGMENT TRAINING: CPT

## 2023-08-11 RX ADMIN — CLONAZEPAM 0.5 MG: 0.5 TABLET ORAL at 21:16

## 2023-08-11 RX ADMIN — ACETAMINOPHEN 650 MG: 325 TABLET ORAL at 14:03

## 2023-08-11 RX ADMIN — METOPROLOL TARTRATE 25 MG: 25 TABLET, FILM COATED ORAL at 08:30

## 2023-08-11 RX ADMIN — PAROXETINE HYDROCHLORIDE 20 MG: 20 TABLET, FILM COATED ORAL at 08:30

## 2023-08-11 RX ADMIN — FERROUS SULFATE TAB 325 MG (65 MG ELEMENTAL FE) 325 MG: 325 (65 FE) TAB at 08:30

## 2023-08-11 RX ADMIN — METOPROLOL TARTRATE 25 MG: 25 TABLET, FILM COATED ORAL at 21:16

## 2023-08-11 RX ADMIN — ENOXAPARIN SODIUM 40 MG: 100 INJECTION SUBCUTANEOUS at 08:32

## 2023-08-11 RX ADMIN — FLUTICASONE PROPIONATE 1 PUFF: 110 AEROSOL, METERED RESPIRATORY (INHALATION) at 06:06

## 2023-08-11 RX ADMIN — FLUTICASONE PROPIONATE 1 PUFF: 110 AEROSOL, METERED RESPIRATORY (INHALATION) at 16:51

## 2023-08-11 RX ADMIN — ATORVASTATIN CALCIUM 40 MG: 40 TABLET, FILM COATED ORAL at 08:30

## 2023-08-11 RX ADMIN — FERROUS SULFATE TAB 325 MG (65 MG ELEMENTAL FE) 325 MG: 325 (65 FE) TAB at 18:00

## 2023-08-11 RX ADMIN — ACETAMINOPHEN 650 MG: 325 TABLET ORAL at 21:17

## 2023-08-11 ASSESSMENT — PAIN SCALES - GENERAL
PAINLEVEL_OUTOF10: 4
PAINLEVEL_OUTOF10: 2

## 2023-08-11 ASSESSMENT — PAIN DESCRIPTION - ORIENTATION: ORIENTATION: LEFT

## 2023-08-11 ASSESSMENT — PAIN DESCRIPTION - DESCRIPTORS: DESCRIPTORS: ACHING

## 2023-08-11 ASSESSMENT — PAIN DESCRIPTION - LOCATION: LOCATION: HIP

## 2023-08-11 NOTE — PROGRESS NOTES
OT DAILY NOTE    Time In:    0700     Time Out: 0744       Functional Mobility   Score Comments   Supine to Sit 4: Supervision or touching A    Sit to Stand 4: Supervision or touching A CGA to SBA   Transfer Assist 4: Supervision or touching A CGA to SBA with RW     Activities of Daily Living   Score Comments   Eating Independent     Oral Hygiene Independent     Bathing Supervision or touching assistance S with RW   Upper Body  Dressing Setup or clean-up assistance     Lower Body Dressing Supervision or touching assistance pt was able to thread underwear and pants using reacher, SBA in stance with RW   Donning/[place] Footwear Dependent A donning R boot   Toilet Transfer Supervision or touching assistance CGA with Mallie or touching assistance SBA with RW       Summary of Session: S: \"I can do this with the reacher on my own now. \" Agreeable to therapy. Focus of session was on morning ADL routine. Patient was able to complete SPT using RW with L knee immobilizer and R boot donned. Pain not expressed. Collaborated with PT and confirmed patient is on track to reach goals as documented in the care plan. Continue OT POC with focus on ADL/IADL skills, functional transfers, functional mobility, coordination, strength, static and dynamic balance, and activity tolerance to maximize safety and independence with ADLs and functional transfers. Patient ended session in recliner with call remote and phone within reach.        Loco Childers, OT  8/11/2023

## 2023-08-11 NOTE — PROGRESS NOTES
Inpatient Rehab Daily Progress Note    P      Admit Date: 8/7/2023      Chief Complaint : Impaired ability to ambulate, transfer, and carryout self care tasks d/t a right distal femur fracture post ORIF in a knee immobilizer and and left 5th metatarsal fracture, both injuries WBAT. Admitting Diagnosis:   History of femur fracture [Z87.81]    Secondary diagnosis:  Post op pain  Asthma  HLD  Anxiety  RICCI on a CPAP      Date of Evaluation: August 11, 2023    Daily/Subjective: Pt seen and evaluated today and his wife was present observing therapies and supporting her Meche VERA and he reports sleeping fine. He is eating and drinking well, and continent of bowel and bladder. His pain is reasonably controlled on current medications and he has been educated on peritherapy dosing as needed. He said therapies are going fine.           Current Facility-Administered Medications   Medication Dose Route Frequency    acetaminophen (TYLENOL) suppository 650 mg  650 mg Rectal Q6H PRN    atorvastatin (LIPITOR) tablet 40 mg  40 mg Oral Daily    Benzocaine-Menthol (CEPACOL SORE THROAT) 15-2.6 MG lozenge 1 lozenge  1 lozenge Oral Q2H PRN    calcium carbonate (TUMS) chewable tablet 500 mg  500 mg Oral TID PRN    clonazePAM (KLONOPIN) tablet 0.5 mg  0.5 mg Oral Nightly    dextrose 10 % infusion   IntraVENous Continuous PRN    dextrose bolus 10% 125 mL  125 mL IntraVENous PRN    Or    dextrose bolus 10% 250 mL  250 mL IntraVENous PRN    ferrous sulfate (IRON 325) tablet 325 mg  325 mg Oral BID WC    glucose chewable tablet 16 g  4 tablet Oral PRN    glucagon (rDNA) injection 1 mg  1 mg SubCUTAneous PRN    metoprolol tartrate (LOPRESSOR) tablet 50 mg  50 mg Oral Q6H PRN    metoprolol tartrate (LOPRESSOR) tablet 25 mg  25 mg Oral BID    ondansetron (ZOFRAN-ODT) disintegrating tablet 4 mg  4 mg Oral Q8H PRN    Or    ondansetron (ZOFRAN) injection 4 mg  4 mg IntraVENous Q6H PRN    polyethylene glycol (GLYCOLAX) packet 17 g  17 g

## 2023-08-11 NOTE — CARE COORDINATION
Patient needs are continue to be followed by Dr. Opal Perez. Patient has discharge date / plan at this time scheduled for 8/18/23. CM made patient aware of the recommendations that were discussed in the Team Conference Meeting. CM provided patient the discharge date / recommendations for [facility] ACUTE [facility] CARE AT [facility]) / patient was agreeable to [address] [place] [address],[address] and requested a referral be made to [facility]. Referral completed. spouse will need to do set a date for family training / TBA for DME's at this time. CM will continue to follow and remain available for any needs, concerns or questions that may arise.

## 2023-08-11 NOTE — PROGRESS NOTES
OT Daily Note  Time In:    1122     Time Out: 1205        Subjective: \"My wife is here. Maybe we can see if she can manage my brace. \"  Pain: not expressed  Education: donning and doffing KI  Interdisciplinary Communication: with PT during handoff     Mobility   Score Comments   Sit to Stand CGA/SBA    Transfer Assist CGA  RW     Education   Educated pt and his wife on doffing and donning KI, simulated on the mat for carryover on pt's bed or couch. Pt's wife attempted to follow instructions, however, reported too much pain in her neck and back when setup next to her . Discussed having home health staff assist with this as it is too difficult for pt's wife. Additionally, discussed hired care for family to assist them both upon initial transition home. Assessment: Will educate and practice pt on donning socks and R boot next week. Additional family training scheduled for Monday at 9:15 with OT and PT. Plan: Continue OT POC.      Loco Davenport OT   8/11/2023

## 2023-08-11 NOTE — PROGRESS NOTES
PHYSICAL THERAPY DAILY NOTE  Time In:  8482  Time Out:  1115  Total Treatment Time:  40 Minutes  Pt.  Seen for: AM, Family Training, Patient Education, Therapeutic Exercise, Transfer Training, and Wheelchair mobility     Subjective: Pt has no complaints; wife at bedside and present during session; has concerns about patient coming home due to her own mobility deficits         Objective:  Precautions: WB: WBAT R LE w/ knee immobilizer on for transfers only, L LE WBAT with walking boot on    GROSS ASSESSMENT Daily Assessment           COGNITION Daily Assessment    Alert, pleasant, and cooperative       BED/MAT MOBILITY Daily Assessment    Rolling Right: NT  Rolling Left: NT  Supine to Sit: Supervision/Standby Assist  Sit to Supine: Supervision/Standby Assist       TRANSFERS Daily Assessment   Pt needs cueing to position wheelchair close to mat during stand pivot transfer Sit to Stand: Supervision/Standby Assist  Stand to Sit: Supervision/Standby Assist  Transfer Type: Stand Pivot  Transfer Assistance: Supervision/Standby Assist  Car Transfers: NT         GAIT Daily Assessment    NA       STEPS/STAIRS Daily Assessment    NA       BALANCE Daily Assessment    Static Sitting: Normal:  Pt. able to maintain balance w/o UE support  Dynamic Sitting: Good - accepts moderate challenge;  can maintain balance while picking object off the floor  Static Standing: Fair:  Pt. requires UE support;  may need occasional min A  Dynamic Standing: Fair - accepts minimal challenge;  can maintain balance while turning head/trunk       WHEELCHAIR MOBILITY Daily Assessment    Able to Propel (ft): 150 ft  Assistance: Supervision/Standby Assist  Surface: Level Surface  Wheelchair Set-up: Manages R Brake, Manages L Brake, Manages R Footrest, and Manages L Footrest       LOWER EXTREMITY EXERCISES Daily Assessment    SUPINE EXERCISES Sets Reps Comments   Ankle Pumps 2 10    Quad Sets 2 10    Glut Sets 2 10    Heel Slides L LE 2 10    Hip Abduction L

## 2023-08-11 NOTE — PROGRESS NOTES
If we can be of any service to you during your stay please let us know      Nate [facility]     [phone]

## 2023-08-12 PROCEDURE — 97535 SELF CARE MNGMENT TRAINING: CPT

## 2023-08-12 PROCEDURE — 6370000000 HC RX 637 (ALT 250 FOR IP): Performed by: PHYSICAL MEDICINE & REHABILITATION

## 2023-08-12 PROCEDURE — 94640 AIRWAY INHALATION TREATMENT: CPT

## 2023-08-12 PROCEDURE — 94760 N-INVAS EAR/PLS OXIMETRY 1: CPT

## 2023-08-12 PROCEDURE — 1180000000 HC REHAB R&B

## 2023-08-12 PROCEDURE — 94660 CPAP INITIATION&MGMT: CPT

## 2023-08-12 PROCEDURE — 97530 THERAPEUTIC ACTIVITIES: CPT

## 2023-08-12 PROCEDURE — 97110 THERAPEUTIC EXERCISES: CPT

## 2023-08-12 PROCEDURE — 6360000002 HC RX W HCPCS: Performed by: PHYSICAL MEDICINE & REHABILITATION

## 2023-08-12 RX ADMIN — CLONAZEPAM 0.5 MG: 0.5 TABLET ORAL at 20:52

## 2023-08-12 RX ADMIN — ATORVASTATIN CALCIUM 40 MG: 40 TABLET, FILM COATED ORAL at 08:37

## 2023-08-12 RX ADMIN — ACETAMINOPHEN 650 MG: 325 TABLET ORAL at 14:09

## 2023-08-12 RX ADMIN — FERROUS SULFATE TAB 325 MG (65 MG ELEMENTAL FE) 325 MG: 325 (65 FE) TAB at 17:31

## 2023-08-12 RX ADMIN — FERROUS SULFATE TAB 325 MG (65 MG ELEMENTAL FE) 325 MG: 325 (65 FE) TAB at 08:37

## 2023-08-12 RX ADMIN — ACETAMINOPHEN 650 MG: 325 TABLET ORAL at 20:53

## 2023-08-12 RX ADMIN — PAROXETINE HYDROCHLORIDE 20 MG: 20 TABLET, FILM COATED ORAL at 08:37

## 2023-08-12 RX ADMIN — FLUTICASONE PROPIONATE 1 PUFF: 110 AEROSOL, METERED RESPIRATORY (INHALATION) at 05:54

## 2023-08-12 RX ADMIN — ENOXAPARIN SODIUM 40 MG: 100 INJECTION SUBCUTANEOUS at 08:37

## 2023-08-12 RX ADMIN — FLUTICASONE PROPIONATE 1 PUFF: 110 AEROSOL, METERED RESPIRATORY (INHALATION) at 17:10

## 2023-08-12 RX ADMIN — METOPROLOL TARTRATE 25 MG: 25 TABLET, FILM COATED ORAL at 20:52

## 2023-08-12 RX ADMIN — METOPROLOL TARTRATE 25 MG: 25 TABLET, FILM COATED ORAL at 08:37

## 2023-08-12 ASSESSMENT — PAIN DESCRIPTION - ORIENTATION
ORIENTATION: RIGHT
ORIENTATION: LEFT

## 2023-08-12 ASSESSMENT — PAIN SCALES - GENERAL
PAINLEVEL_OUTOF10: 2
PAINLEVEL_OUTOF10: 4
PAINLEVEL_OUTOF10: 2

## 2023-08-12 ASSESSMENT — PAIN DESCRIPTION - DESCRIPTORS
DESCRIPTORS: ACHING
DESCRIPTORS: ACHING

## 2023-08-12 ASSESSMENT — PAIN DESCRIPTION - LOCATION
LOCATION: LEG
LOCATION: KNEE

## 2023-08-12 NOTE — PROGRESS NOTES
OT DAILY NOTE    Time In: 906  Time Out: 1002      Eating Current Functional Level   Score (P) Setup or clean-up assistance   Comments       Oral Hygiene  Current Functional Level   Score (P) Setup or clean-up assistance   Comments       Bathing Current Functional Level   Score (P) Supervision or touching assistance   Comments       Upper Body   Dressing Current Functional Level   Score (P) Setup or clean-up assistance   Comments       Lower Body   Dressing Current Functional Level     Functional Level (P) Substantial/maximal assistance   Comments (P) due to boot and leg brace     Donning/[place]  Footwear Current Functional Level     Functional Level (P) Substantial/maximal assistance   Comments (P) able to don left sock by crossing knee     Toilet transfer Current Functional Level   Functional Level (P) Supervision or touching assistance   Comments       Toilet Hygiene   Current Functional Level   Score (P) Substantial/maximal assistance   Comments (P) attempted to clean self after bowel movement - completed with max A     Summary of Session: Pt agreeable to wash up at sink. See above for ADL. Plan: Continue OT POC with focus on ADL/IADL skills, functional transfers, functional mobility, coordination, strength, static and dynamic balance, and activity tolerance to maximize safety and independence with ADLs and functional transfers.       Margi Aviles, HAILEY  8/12/2023

## 2023-08-12 NOTE — PROGRESS NOTES
Inpatient Rehab Daily Progress Note    P      Admit Date: 8/7/2023      Chief Complaint : Impaired ability to ambulate, transfer, and carryout self care tasks d/t a right distal femur fracture post ORIF in a knee immobilizer and and left 5th metatarsal fracture, both injuries WBAT. Admitting Diagnosis:   History of femur fracture [Z87.81]    Secondary diagnosis:  Post op pain  Asthma  HLD  Anxiety  RICCI on a CPAP      Date of Evaluation: August 12, 2023    Daily/Subjective: Pt seen and evaluated today. He was in a good mood and smiling some, he usually has a flat affect. NAEO and he reports sleeping fine. He is eating and drinking well, and continent of bowel and bladder. His pain is reasonably controlled on current medications and he has been educated on peritherapy dosing as needed. He said therapies are going fine and had no questions.           Current Facility-Administered Medications   Medication Dose Route Frequency    acetaminophen (TYLENOL) suppository 650 mg  650 mg Rectal Q6H PRN    atorvastatin (LIPITOR) tablet 40 mg  40 mg Oral Daily    Benzocaine-Menthol (CEPACOL SORE THROAT) 15-2.6 MG lozenge 1 lozenge  1 lozenge Oral Q2H PRN    calcium carbonate (TUMS) chewable tablet 500 mg  500 mg Oral TID PRN    clonazePAM (KLONOPIN) tablet 0.5 mg  0.5 mg Oral Nightly    dextrose 10 % infusion   IntraVENous Continuous PRN    dextrose bolus 10% 125 mL  125 mL IntraVENous PRN    Or    dextrose bolus 10% 250 mL  250 mL IntraVENous PRN    ferrous sulfate (IRON 325) tablet 325 mg  325 mg Oral BID WC    glucose chewable tablet 16 g  4 tablet Oral PRN    glucagon (rDNA) injection 1 mg  1 mg SubCUTAneous PRN    metoprolol tartrate (LOPRESSOR) tablet 50 mg  50 mg Oral Q6H PRN    metoprolol tartrate (LOPRESSOR) tablet 25 mg  25 mg Oral BID    ondansetron (ZOFRAN-ODT) disintegrating tablet 4 mg  4 mg Oral Q8H PRN    Or    ondansetron (ZOFRAN) injection 4 mg  4 mg IntraVENous Q6H PRN    polyethylene glycol (GLYCOLAX) Without hallucinations, flat affect, but smiled this am.      No results found for this or any previous visit (from the past 72 hour(s)). Assessment:   Rehabilitation Plan  The patient has shown the ability to tolerate and benefit from 3 hours of therapy daily and is being admitted to a comprehensive acute inpatient rehabilitation program consisting of at least 3 hours of combined physical and occupational therapies. - Begin intensive Physical Therapy for a minimum of 1.5 hours a day, at least 5 out of 7 days per week to address bed mobility, transfers, ambulation, strengthening, balance, and endurance. - Begin intensive Occupational Therapy for a minimum of 1.5 hours a day, at least 5 out of 7 days per week to address ADLs (bathing, LE dressing, toileting) and adaptive equipment as needed. - Speech Therapy prn for: dysarthria, impaired communication skills; therapy schedule may be adjusted by MD based on patient's needs. Each of these therapies will be continued as above for the duration of the inpatient rehab stay. The patient will also require 24-hour skilled rehabilitation nursing for bowel and bladder management, skin care for decubitus ulcer prevention, pain management and ongoing medication administration. Plan / Recommendations / Medical Decision Making:     Daily physician / PA medical management: 58yo M post mechanical fall resulting in a right distal femur fracture post ORIF in a knee immobilizer and and left 5th metatarsal fracture, both injuries WBAT. History of femur fracture [Z87.81] - RLE distal femur fracture post ORIF and retrorade nail and left 5th metatarsal fracture non-op management. Start interdisciplinary approach to rehabilitation including PT, OT, SLP prn, nursing and physiatry and disease specific education.   -Pain control with Tylenol 650mg q4h prn. Pt doesn't want narcotics ordered on IPR admission.     Post op SVT - Managed medically on Metoprolol Tartrate 25mg

## 2023-08-12 NOTE — PROGRESS NOTES
Physical Therapy  Facility/Department: [facility] 9 INPATIENT REHAB UNIT  Daily Treatment Note  NAME: Claudine Gay  : 1960  MRN: [id number]    Date of Service: 2023    Discharge Recommendations:           Patient Diagnosis(es): There were no encounter diagnoses. Assessment         Plan          Restrictions        Subjective          Objective   Vitals                         Goals       Education       Therapy Time   Individual Concurrent Group Co-treatment   Time In   1125       Time Out   1202       Minutes   37       Timed Code Treatment Minutes: 40 Minutes       Margaret Cornejo PTA         PHYSICAL THERAPY DAILY NOTE  Time In:  1125  Time Out:  1202  Total Treatment Time:  40 Minutes  Pt.  Seen for: AM, Patient Education, and Transfer Training     Subjective: \"I'm doing okay\"         Objective:  Precautions: Falls and WB: NWB with gait , KI RLE, =boot donne LLE  GROSS ASSESSMENT Daily Assessment           COGNITION Daily Assessment           BED/MAT MOBILITY Daily Assessment    Rolling Right: CGA  Rolling Left: CGA  Supine to Sit: CGA  Sit to Supine: CGA       TRANSFERS Daily Assessment    Sit to Stand: Min A  Stand to Sit: Min A  Transfer Type: Stand Pivot  Transfer Assistance: FWW min a- CGA  Car Transfers: NT         GAIT Daily Assessment   Non ambulatory at this time Amount of Assistance: NT  Distance (ft): 0  Assistive Device:  NT  Surface: NT       STEPS/STAIRS Daily Assessment    Steps Ambulated:  0  Level of Assistance:  NT  Railing: NT  Assistive Device:  NT       BALANCE Daily Assessment    Static Sitting: Normal:  Pt. able to maintain balance w/o UE support  Dynamic Sitting: Fair - accepts minimal challenge;  can maintain balance while turning head/trunk  Static Standing: NT  Dynamic Standing: NT       WHEELCHAIR MOBILITY Daily Assessment    Able to Propel (ft): 0  Assistance: NT  Surface: NT  Wheelchair Set-up: NT       LOWER EXTREMITY EXERCISES Daily Assessment   Supine:  QS 3x10 3 sec

## 2023-08-13 PROCEDURE — 94760 N-INVAS EAR/PLS OXIMETRY 1: CPT

## 2023-08-13 PROCEDURE — 94640 AIRWAY INHALATION TREATMENT: CPT

## 2023-08-13 PROCEDURE — 94660 CPAP INITIATION&MGMT: CPT

## 2023-08-13 PROCEDURE — 6370000000 HC RX 637 (ALT 250 FOR IP): Performed by: PHYSICAL MEDICINE & REHABILITATION

## 2023-08-13 PROCEDURE — 1180000000 HC REHAB R&B

## 2023-08-13 PROCEDURE — 94761 N-INVAS EAR/PLS OXIMETRY MLT: CPT

## 2023-08-13 PROCEDURE — 6360000002 HC RX W HCPCS: Performed by: PHYSICAL MEDICINE & REHABILITATION

## 2023-08-13 RX ADMIN — PAROXETINE HYDROCHLORIDE 20 MG: 20 TABLET, FILM COATED ORAL at 08:27

## 2023-08-13 RX ADMIN — ENOXAPARIN SODIUM 40 MG: 100 INJECTION SUBCUTANEOUS at 08:27

## 2023-08-13 RX ADMIN — FLUTICASONE PROPIONATE 1 PUFF: 110 AEROSOL, METERED RESPIRATORY (INHALATION) at 16:59

## 2023-08-13 RX ADMIN — ACETAMINOPHEN 650 MG: 325 TABLET ORAL at 20:36

## 2023-08-13 RX ADMIN — METOPROLOL TARTRATE 25 MG: 25 TABLET, FILM COATED ORAL at 20:36

## 2023-08-13 RX ADMIN — FERROUS SULFATE TAB 325 MG (65 MG ELEMENTAL FE) 325 MG: 325 (65 FE) TAB at 08:27

## 2023-08-13 RX ADMIN — FERROUS SULFATE TAB 325 MG (65 MG ELEMENTAL FE) 325 MG: 325 (65 FE) TAB at 17:32

## 2023-08-13 RX ADMIN — ATORVASTATIN CALCIUM 40 MG: 40 TABLET, FILM COATED ORAL at 08:27

## 2023-08-13 RX ADMIN — CLONAZEPAM 0.5 MG: 0.5 TABLET ORAL at 20:36

## 2023-08-13 RX ADMIN — FLUTICASONE PROPIONATE 1 PUFF: 110 AEROSOL, METERED RESPIRATORY (INHALATION) at 05:20

## 2023-08-13 RX ADMIN — METOPROLOL TARTRATE 25 MG: 25 TABLET, FILM COATED ORAL at 08:27

## 2023-08-13 ASSESSMENT — PAIN DESCRIPTION - ORIENTATION: ORIENTATION: RIGHT

## 2023-08-13 ASSESSMENT — PAIN DESCRIPTION - LOCATION: LOCATION: LEG

## 2023-08-13 ASSESSMENT — PAIN DESCRIPTION - DESCRIPTORS: DESCRIPTORS: ACHING

## 2023-08-13 ASSESSMENT — PAIN SCALES - GENERAL
PAINLEVEL_OUTOF10: 0
PAINLEVEL_OUTOF10: 2

## 2023-08-14 ENCOUNTER — TELEPHONE (OUTPATIENT)
Dept: ORTHOPEDIC SURGERY | Age: 63
End: 2023-08-14

## 2023-08-14 PROCEDURE — 94760 N-INVAS EAR/PLS OXIMETRY 1: CPT

## 2023-08-14 PROCEDURE — 97150 GROUP THERAPEUTIC PROCEDURES: CPT

## 2023-08-14 PROCEDURE — 97535 SELF CARE MNGMENT TRAINING: CPT

## 2023-08-14 PROCEDURE — 97110 THERAPEUTIC EXERCISES: CPT

## 2023-08-14 PROCEDURE — 6360000002 HC RX W HCPCS: Performed by: PHYSICAL MEDICINE & REHABILITATION

## 2023-08-14 PROCEDURE — 6370000000 HC RX 637 (ALT 250 FOR IP): Performed by: PHYSICAL MEDICINE & REHABILITATION

## 2023-08-14 PROCEDURE — 1180000000 HC REHAB R&B

## 2023-08-14 PROCEDURE — 97530 THERAPEUTIC ACTIVITIES: CPT

## 2023-08-14 PROCEDURE — 94640 AIRWAY INHALATION TREATMENT: CPT

## 2023-08-14 PROCEDURE — 97542 WHEELCHAIR MNGMENT TRAINING: CPT

## 2023-08-14 RX ADMIN — METOPROLOL TARTRATE 25 MG: 25 TABLET, FILM COATED ORAL at 08:08

## 2023-08-14 RX ADMIN — ENOXAPARIN SODIUM 40 MG: 100 INJECTION SUBCUTANEOUS at 08:08

## 2023-08-14 RX ADMIN — FLUTICASONE PROPIONATE 1 PUFF: 110 AEROSOL, METERED RESPIRATORY (INHALATION) at 16:18

## 2023-08-14 RX ADMIN — CLONAZEPAM 0.5 MG: 0.5 TABLET ORAL at 20:38

## 2023-08-14 RX ADMIN — ATORVASTATIN CALCIUM 40 MG: 40 TABLET, FILM COATED ORAL at 08:08

## 2023-08-14 RX ADMIN — METOPROLOL TARTRATE 25 MG: 25 TABLET, FILM COATED ORAL at 20:37

## 2023-08-14 RX ADMIN — PAROXETINE HYDROCHLORIDE 20 MG: 20 TABLET, FILM COATED ORAL at 08:08

## 2023-08-14 RX ADMIN — FERROUS SULFATE TAB 325 MG (65 MG ELEMENTAL FE) 325 MG: 325 (65 FE) TAB at 16:52

## 2023-08-14 RX ADMIN — FERROUS SULFATE TAB 325 MG (65 MG ELEMENTAL FE) 325 MG: 325 (65 FE) TAB at 08:08

## 2023-08-14 RX ADMIN — FLUTICASONE PROPIONATE 1 PUFF: 110 AEROSOL, METERED RESPIRATORY (INHALATION) at 05:56

## 2023-08-14 RX ADMIN — ACETAMINOPHEN 650 MG: 325 TABLET ORAL at 15:00

## 2023-08-14 ASSESSMENT — PAIN DESCRIPTION - DESCRIPTORS: DESCRIPTORS: SORE;TIGHTNESS

## 2023-08-14 ASSESSMENT — PAIN DESCRIPTION - LOCATION: LOCATION: LEG

## 2023-08-14 ASSESSMENT — PAIN - FUNCTIONAL ASSESSMENT: PAIN_FUNCTIONAL_ASSESSMENT: ACTIVITIES ARE NOT PREVENTED

## 2023-08-14 ASSESSMENT — PAIN SCALES - GENERAL
PAINLEVEL_OUTOF10: 0
PAINLEVEL_OUTOF10: 6
PAINLEVEL_OUTOF10: 0
PAINLEVEL_OUTOF10: 3

## 2023-08-14 ASSESSMENT — PAIN DESCRIPTION - ORIENTATION: ORIENTATION: RIGHT;LEFT

## 2023-08-14 NOTE — CARE COORDINATION
Patient needs are continue to be followed by Dr. Mariaa Styles. Patient has discharge date / plan at this time scheduled for 8/18/23. Spouse has been attending family training. PT Pieter Rodriguez) has requested DME's (RW/BSC). Referral made to [facility] for DME's. CM made been made aware that patient will need wheelchair transport home. CM will arrange patient to have transport home. CM received a call from [facility] requesting updated clinicals. Updated Clinicals faxed to [phone]. Syd Partha provided her contact information [phone] ext. [phone]. CM will continue to follow / monitor for any needs, concerns or questions that may arise.

## 2023-08-14 NOTE — PROGRESS NOTES
OT WEEKLY PROGRESS NOTE    Time In 0920   Time Out 1016     GOALS:  LTG 1: Patient will complete UB dressing with independence using AE/DME PRN within 10 days. Goal progressing 8/14/23     LTG 2: Patient will complete LB dressing with supervision using AE/DME PRN within 10 days. Goal progressing 8/14/23     LTG 3: Patient will don footwear with setup assist using AE/DME PRN within 10 days. Goal progressing 8/14/23     LTG 4: Patient will complete bathing with supervision using AE/DME PRN within 10 days. Goal progressing 8/14/23     LTG 5: Patient will complete toileting with supervision using AE/DME PRN within 10 days. Goal progressing 8/14/23    Subjective: Patient agreeable to therapy. Pain: No pain expressed.   Precautions: Falls and WBAT BLE    MOBILITY:   Score Comments   Rolling Independent I   Supine to Sit Independent I   Sit to Supine Independent I   Sit to Stand Supervision or touching assistance S   Transfer Assist Supervision or touching assistance S        ACTIVITIES OF DAILY LIVING:    Initial Score Initial Comments Current Score Current Comments   Eating Independent         Oral Hyigene Setup or clean-up assistance s/u A Setup or clean-up assistance s/u A   Bathing Substantial/maximal assistance CGA in stance with RW while pt washed bottom and víctor area Setup or clean-up assistance S/U A seated sink side   Upper Body  Dressing Setup or clean-up assistance S/U A Setup or clean-up assistance S/U A   Lower Body Dressing Dependent assist to thread pants, pt able to pull over waist with cuing for safety and technique  Partial/moderate assistance Mod A to don over feet, Min A in standing while donning to waist   Donning/[place] Footwear Dependent  secondary to time constraints Dependent D this day     Toilet Transfer Partial/moderate assistance Alfredo with RW Partial/moderate assistance Min A w/ RW and grab bars   Toileting Hygiene Substantial/maximal assistance Assist for balance at grab bar, cues for

## 2023-08-14 NOTE — PLAN OF CARE
Problem: Safety - Adult  Goal: Free from fall injury  Outcome: Progressing     Problem: ABCDS Injury Assessment  Goal: Absence of physical injury  Outcome: Progressing     Problem: Pain  Goal: Verbalizes/displays adequate comfort level or baseline comfort level  Outcome: Progressing     Problem: Skin/Tissue Integrity  Goal: Absence of new skin breakdown  Description: 1. Monitor for areas of redness and/or skin breakdown  2. Assess vascular access sites hourly  3. Every 4-6 hours minimum:  Change oxygen saturation probe site  4. Every 4-6 hours:  If on nasal continuous positive airway pressure, respiratory therapy assess nares and determine need for appliance change or resting period.   Outcome: Progressing     Problem: Respiratory - Adult  Goal: Achieves optimal ventilation and oxygenation  Outcome: Progressing  Flowsheets (Taken 8/14/2023 0391)  Achieves optimal ventilation and oxygenation:   Assess for changes in respiratory status   Assess for changes in mentation and behavior

## 2023-08-14 NOTE — PROGRESS NOTES
Inpatient Rehab Daily Progress Note    P      Admit Date: 8/7/2023      Chief Complaint : Impaired ability to ambulate, transfer, and carryout self care tasks d/t a right distal femur fracture post ORIF in a knee immobilizer and and left 5th metatarsal fracture, both injuries WBAT. Admitting Diagnosis:   History of femur fracture [Z87.81]    Secondary diagnosis:  Post op pain  Asthma  HLD  Anxiety  RICCI on a CPAP      Date of Evaluation: August 14, 2023    Daily/Subjective: Pt seen and evaluated today. He was in a good mood and smiling some again. He usually has a flat affect. NAEO and he reports sleeping fine. He is eating and drinking well, and continent of bowel and bladder. His pain is reasonably controlled on current medications and he has been educated on peritherapy dosing as needed. Pt said he didn't have any pain when I saw him. He said therapies are going well.           Current Facility-Administered Medications   Medication Dose Route Frequency    acetaminophen (TYLENOL) suppository 650 mg  650 mg Rectal Q6H PRN    atorvastatin (LIPITOR) tablet 40 mg  40 mg Oral Daily    Benzocaine-Menthol (CEPACOL SORE THROAT) 15-2.6 MG lozenge 1 lozenge  1 lozenge Oral Q2H PRN    calcium carbonate (TUMS) chewable tablet 500 mg  500 mg Oral TID PRN    clonazePAM (KLONOPIN) tablet 0.5 mg  0.5 mg Oral Nightly    dextrose 10 % infusion   IntraVENous Continuous PRN    dextrose bolus 10% 125 mL  125 mL IntraVENous PRN    Or    dextrose bolus 10% 250 mL  250 mL IntraVENous PRN    ferrous sulfate (IRON 325) tablet 325 mg  325 mg Oral BID WC    glucose chewable tablet 16 g  4 tablet Oral PRN    glucagon (rDNA) injection 1 mg  1 mg SubCUTAneous PRN    metoprolol tartrate (LOPRESSOR) tablet 50 mg  50 mg Oral Q6H PRN    metoprolol tartrate (LOPRESSOR) tablet 25 mg  25 mg Oral BID    ondansetron (ZOFRAN-ODT) disintegrating tablet 4 mg  4 mg Oral Q8H PRN    Or    ondansetron (ZOFRAN) injection 4 mg  4 mg IntraVENous Q6H PRN medically on Metoprolol Tartrate 25mg BID and 50mg prn     Post op Anemia - Most recent H/H 8/7/23 was 8.2/27.9 up from 6.6 on 8/2/23 and post transfusion. Follow with surveillance labs and treat as indicated.  -FeSO4 325mg BID and Vitamin C 250mg BID    HLD - continue Lipitor 40mg daily    Asthma - Continue Fluticasone HFA BID    Pneumonia prophylaxis - incentive spirometer 10x every hour while awake. DVT risk / DVT prophylaxis - mobilize as tolerated. SCDs when in bed; ÁLVARO hose when out of bed. Lovenox 40mg daily subcutaneously daily. ROE - resolved    Anxiety- continue Paxil 20mg daily and Diazepam 0.5mg qhs. General skin care / wound prevention - monitor right femur incision and general skin wound status daily. At risk for failure due to impaired mobility. keep wound clean and dry, reinforce dressing PRN, ice to area for comfort, and as directed    Bladder program / urinary retention / neurogenic bladder - schedule voids q6-8h prn. Check post-void residual as needed; in-and-out catheter if post-void residual is more than 400ml. Pt reports normal bladder function on IPR admission without neurogenic bladder or OAB. Bowel program - at risk for constipation as a side effect of opioids, other medications, impaired mobility, etc. MiraLAX daily for regularity, Senokot-S for stool softener + laxative. PRN MOM, bisacodyl suppository or tablets for constipation. Pt reports normal bowel function on IPR admission without neurogenic bowel or constipation. Discharge: Home with wife, tentatively planned for 8/18/23. Follow Up:  1. Dr. Papa Berry (PCP) 1-2 weeks post IPR  2. Dr. Noam Butler MD (Orthopedics) per his f/u scheduling    Time spent was 26 minutes with over 1/2 in direct patient care/examination, consultation with nursing, therapist and coordination of care.       Signed By: Carrie Goodwin MD     August 14, 2023

## 2023-08-14 NOTE — PROGRESS NOTES
PHYSICAL THERAPY DAILY NOTE  Time In:  8235  Time Out:  1437  Total Treatment Time:  46 Minutes  Pt. Seen for: PM, Therapeutic Exercise, Transfer Training, and Wheelchair mobility     Subjective: Pt. Reports he has figured out a way to push his RW while in w/c to bring it from room to room in the house. Objective:  Precautions: Falls and WB: WBAT R LE w/ KI for transfers only, WBAT L LE w/ walking boot    COGNITION Daily Assessment    Pt. Requires increased time for processing, decreased short term recall       BED/MAT MOBILITY Daily Assessment    Rolling Right: NT  Rolling Left: NT  Supine to Sit: Modified Independent  Sit to Supine: Modified Independent       TRANSFERS Daily Assessment   Required Vcs to problem solve obstacles getting from w/c to bed Sit to Stand: Supervision/Standby Assist  Stand to Sit: Supervision/Standby Assist  Transfer Type: Stand Pivot w/ RW  Transfer Assistance: Supervision/Standby Assist  Car Transfers: NT         GAIT Daily Assessment   NA        STEPS/STAIRS Daily Assessment   NA        BALANCE Daily Assessment    Static Sitting: Normal:  Pt. able to maintain balance w/o UE support  Dynamic Sitting: Good - accepts moderate challenge;  can maintain balance while picking object off the floor  Static Standing: Good:  Pt. able to maintain balance w/o UE support;  exhibits some postural sway  Dynamic Standing: Fair - accepts minimal challenge;  can maintain balance while turning head/trunk       WHEELCHAIR MOBILITY Daily Assessment   Requires occasional assistance for R footrest Able to Propel (ft): 150'x2  Assistance: Modified Independent  Surface: Level Surface  Wheelchair Set-up: Manages R Brake and Manages L Brake       LOWER EXTREMITY EXERCISES Daily Assessment   Pt.  Performed supine LE exercises to increase strength B LEs & ROM L LE SUPINE EXERCISES Sets Reps Comments   Ankle Pumps 1 15    Quad Sets 1 15    Glut Sets 1 15    Heel Slides 1 15 L LE    Hip Abduction 1 15 Board &

## 2023-08-15 ENCOUNTER — TELEPHONE (OUTPATIENT)
Dept: SLEEP MEDICINE | Age: 63
End: 2023-08-15

## 2023-08-15 LAB
ANION GAP SERPL CALC-SCNC: 2 MMOL/L (ref 2–11)
BUN SERPL-MCNC: 16 MG/DL (ref 8–23)
CALCIUM SERPL-MCNC: 9.1 MG/DL (ref 8.3–10.4)
CHLORIDE SERPL-SCNC: 109 MMOL/L (ref 101–110)
CO2 SERPL-SCNC: 30 MMOL/L (ref 21–32)
CREAT SERPL-MCNC: 1 MG/DL (ref 0.8–1.5)
ERYTHROCYTE [DISTWIDTH] IN BLOOD BY AUTOMATED COUNT: 20.1 % (ref 11.9–14.6)
GLUCOSE SERPL-MCNC: 94 MG/DL (ref 65–100)
HCT VFR BLD AUTO: 33.3 % (ref 41.1–50.3)
HGB BLD-MCNC: 9.9 G/DL (ref 13.6–17.2)
MCH RBC QN AUTO: 30.1 PG (ref 26.1–32.9)
MCHC RBC AUTO-ENTMCNC: 29.7 G/DL (ref 31.4–35)
MCV RBC AUTO: 101.2 FL (ref 82–102)
NRBC # BLD: 0 K/UL (ref 0–0.2)
PLATELET # BLD AUTO: 422 K/UL (ref 150–450)
PMV BLD AUTO: 10 FL (ref 9.4–12.3)
POTASSIUM SERPL-SCNC: 4.5 MMOL/L (ref 3.5–5.1)
RBC # BLD AUTO: 3.29 M/UL (ref 4.23–5.6)
SODIUM SERPL-SCNC: 141 MMOL/L (ref 133–143)
WBC # BLD AUTO: 6.7 K/UL (ref 4.3–11.1)

## 2023-08-15 PROCEDURE — 94640 AIRWAY INHALATION TREATMENT: CPT

## 2023-08-15 PROCEDURE — 97530 THERAPEUTIC ACTIVITIES: CPT

## 2023-08-15 PROCEDURE — 36415 COLL VENOUS BLD VENIPUNCTURE: CPT

## 2023-08-15 PROCEDURE — 97542 WHEELCHAIR MNGMENT TRAINING: CPT

## 2023-08-15 PROCEDURE — 6360000002 HC RX W HCPCS: Performed by: PHYSICAL MEDICINE & REHABILITATION

## 2023-08-15 PROCEDURE — 85027 COMPLETE CBC AUTOMATED: CPT

## 2023-08-15 PROCEDURE — 97110 THERAPEUTIC EXERCISES: CPT

## 2023-08-15 PROCEDURE — 1180000000 HC REHAB R&B

## 2023-08-15 PROCEDURE — 94760 N-INVAS EAR/PLS OXIMETRY 1: CPT

## 2023-08-15 PROCEDURE — 80048 BASIC METABOLIC PNL TOTAL CA: CPT

## 2023-08-15 PROCEDURE — 6370000000 HC RX 637 (ALT 250 FOR IP): Performed by: PHYSICAL MEDICINE & REHABILITATION

## 2023-08-15 PROCEDURE — 97535 SELF CARE MNGMENT TRAINING: CPT

## 2023-08-15 RX ADMIN — FLUTICASONE PROPIONATE 1 PUFF: 110 AEROSOL, METERED RESPIRATORY (INHALATION) at 17:58

## 2023-08-15 RX ADMIN — ATORVASTATIN CALCIUM 40 MG: 40 TABLET, FILM COATED ORAL at 08:42

## 2023-08-15 RX ADMIN — ENOXAPARIN SODIUM 40 MG: 100 INJECTION SUBCUTANEOUS at 08:43

## 2023-08-15 RX ADMIN — FERROUS SULFATE TAB 325 MG (65 MG ELEMENTAL FE) 325 MG: 325 (65 FE) TAB at 16:38

## 2023-08-15 RX ADMIN — CLONAZEPAM 0.5 MG: 0.5 TABLET ORAL at 20:38

## 2023-08-15 RX ADMIN — PAROXETINE HYDROCHLORIDE 20 MG: 20 TABLET, FILM COATED ORAL at 08:42

## 2023-08-15 RX ADMIN — FLUTICASONE PROPIONATE 1 PUFF: 110 AEROSOL, METERED RESPIRATORY (INHALATION) at 05:45

## 2023-08-15 RX ADMIN — METOPROLOL TARTRATE 25 MG: 25 TABLET, FILM COATED ORAL at 20:39

## 2023-08-15 RX ADMIN — FERROUS SULFATE TAB 325 MG (65 MG ELEMENTAL FE) 325 MG: 325 (65 FE) TAB at 08:42

## 2023-08-15 RX ADMIN — METOPROLOL TARTRATE 25 MG: 25 TABLET, FILM COATED ORAL at 08:42

## 2023-08-15 NOTE — PROGRESS NOTES
Inpatient Rehab Program  [address] [place] [address]  Tel: [phone]     8/15/2023    Brina Parker  Admit date: 8/7/2023  Admit diagnosis:   History of femur fracture [Z87.81]  Not bearing weight on lower extremity [R26.89]  Encounter for rehabilitation [Z51.89]  Impaired functional mobility, balance, gait, and endurance [Z74.09]  Decreased independence with activities of daily living [Z78.9]    Statement of Medical Necessity - Bedside Commode  Brina Parker has been evaluated face-to-face on a daily basis by the Physiatry MD / PA for the above-noted diagnoses since admission to our rehabilitation program. It is medically necessary for this patient to have a bedside commode at discharge from inpatient rehab. He sustained a significant distal right femur fracture that required a complex open repair with subsequent post-operative range of motion and weight-bearing restrictions. He is unable to ambulate due to the R LE non-weightbearing status and must wear a knee immobilizer. He is essentially room-confined, as these restrictions make it impossible for him to get into his bathroom and access the toilet.        Noemí Cobb PA-C  Physician Assistant with [facility]

## 2023-08-15 NOTE — PROGRESS NOTES
Inpatient Rehab Daily Progress Note    P      Admit Date: 8/7/2023      Chief Complaint : Impaired ability to ambulate, transfer, and carryout self care tasks d/t a right distal femur fracture post ORIF in a knee immobilizer and and left 5th metatarsal fracture, both injuries WBAT. Admitting Diagnosis:   History of femur fracture [Z87.81]    Secondary diagnosis:  Post op pain  Asthma  HLD  Anxiety  RICCI on a CPAP      Date of Evaluation: August 15, 2023    Daily/Subjective: Pt seen and evaluated today. He was in a good mood again and smiling some. He often has a flat affect. NAEO and he reports sleeping fine. He is eating and drinking well, and continent of bowel and bladder. His pain is reasonably controlled on current medications and he has been educated on peritherapy dosing as needed. Pt said he didn't have any pain again when I saw him. He said therapies are going well.           Current Facility-Administered Medications   Medication Dose Route Frequency    acetaminophen (TYLENOL) suppository 650 mg  650 mg Rectal Q6H PRN    atorvastatin (LIPITOR) tablet 40 mg  40 mg Oral Daily    Benzocaine-Menthol (CEPACOL SORE THROAT) 15-2.6 MG lozenge 1 lozenge  1 lozenge Oral Q2H PRN    calcium carbonate (TUMS) chewable tablet 500 mg  500 mg Oral TID PRN    clonazePAM (KLONOPIN) tablet 0.5 mg  0.5 mg Oral Nightly    dextrose 10 % infusion   IntraVENous Continuous PRN    dextrose bolus 10% 125 mL  125 mL IntraVENous PRN    Or    dextrose bolus 10% 250 mL  250 mL IntraVENous PRN    ferrous sulfate (IRON 325) tablet 325 mg  325 mg Oral BID WC    glucose chewable tablet 16 g  4 tablet Oral PRN    glucagon (rDNA) injection 1 mg  1 mg SubCUTAneous PRN    metoprolol tartrate (LOPRESSOR) tablet 50 mg  50 mg Oral Q6H PRN    metoprolol tartrate (LOPRESSOR) tablet 25 mg  25 mg Oral BID    ondansetron (ZOFRAN-ODT) disintegrating tablet 4 mg  4 mg Oral Q8H PRN    Or    ondansetron (ZOFRAN) injection 4 mg  4 mg IntraVENous Q6H laxative. PRN MOM, bisacodyl suppository or tablets for constipation. Pt reports normal bowel function on IPR admission without neurogenic bowel or constipation. Functional Measurements: WC mobility 150' indep, Bed mobility and Transfers SV, UBD/LBD/bathing SV/SBA, Feeding/Hygiene Indep    Discharge: Home with wife, tentatively planned for 8/18/23. Follow Up:  1. Dr. Rocky Cedillo (PCP) 1-2 weeks post IPR  2. Dr. Keyur Negro MD (Orthopedics) per his f/u scheduling    Time spent was 25 minutes with over 1/2 in direct patient care/examination, consultation with nursing, therapist and coordination of care.       Signed By: Avila Ellington MD     August 15, 2023

## 2023-08-15 NOTE — PROGRESS NOTES
OT DAILY NOTE    Time In:    0740     Time Out: 0824       Functional Mobility   Score Comments   Supine to Sit 4: Supervision or touching A S   Sit to Stand 4: Supervision or touching A SBA   Transfer Assist 4: Supervision or touching A SBA with RW     Activities of Daily Living   Score Comments   Eating Independent     Oral Hygiene Independent s/u A   Bathing Supervision or touching assistance S with RW   Upper Body  Dressing Setup or clean-up assistance S/U A   Lower Body Dressing Supervision or touching assistance pt threaded clothing using reacher, SBA with RW in stance. cuing for donning and doffing KI   Donning/[place] Footwear Supervision or touching assistance cuing for use of sock aide and technique to don L boot       Summary of Session: S: \"The leg  really helps. \" Agreeable to therapy. Focus of session was on morning ADL routine. Patient was able to complete SPT using a RW with SBA. Pain not expressed. Collaborated with PT and confirmed patient is on track to reach goals as documented in the care plan. Continue OT POC with focus on ADL/IADL skills, functional transfers, functional mobility, coordination, strength, static and dynamic balance, and activity tolerance to maximize safety and independence with ADLs and functional transfers. Patient ended session in wc with call remote and phone within reach.        Loco Childress, OT  8/15/2023

## 2023-08-15 NOTE — PROGRESS NOTES
OT Daily Note  Time In:    1115     Time Out: 1201        Subjective: \"I think my hands are maybe sore from my fall. \"  Pain: not expressed  Education: BUE strengthening exercises   Interdisciplinary Communication: with PT regarding DME needs    Mobility   Score Comments   Transfer Assist S Propelling self in wc      Strengthening   Pt completed 12 minutes on the ergometer frontwards and backwards with medium resistance to increase UB strength and activity tolerance for integration into functional transfers. Pt completed x10 reps x3 sets x 36 lbs on RecommendiScayl working on UB strengthening for carryover into sit > stand transfers. [facility]    Pt completed the following exercises [id number] each using the heavy resistance therasponge in bilateral hands in order to improve hand strength: full fist grasp, pincer grasp, three jaw connor, thumb flex, digital flex, and lateral key pinch. Pt with appropriate motor planning and grasping patterns noted with minimal cues for setup of exercises. Assessment: Progressing well. Plan: Continue OT POC.      Loco Childers, LONG   8/15/2023

## 2023-08-15 NOTE — PROGRESS NOTES
PHYSICAL THERAPY DAILY NOTE  Time In:  1030  Time Out:  1113  Total Treatment Time:  43 Minutes  Pt. Seen for: AM, Therapeutic Exercise, Transfer Training, and Wheelchair mobility     Subjective: Pt. Reports he does not know if his wife has found someone to put his w/c in her car? Objective:  Precautions: Falls and WB: WBAT R LE w/ KI for transfers only, WBAT L LE for ankle boot    COGNITION Daily Assessment    Pt. Exhibits decreased short term recall & delayed processing       BED/MAT MOBILITY Daily Assessment    Rolling Right: NT  Rolling Left: NT  Supine to Sit: Modified Independent  Sit to Supine: Modified Independent       TRANSFERS Daily Assessment    Sit to Stand: Supervision/Standby Assist  Stand to Sit: Supervision/Standby Assist  Transfer Type: Stand Pivot  Transfer Assistance: Supervision/Standby Assist  Car Transfers: NT         GAIT Daily Assessment   NA        STEPS/STAIRS Daily Assessment   NA        BALANCE Daily Assessment    Static Sitting: Normal:  Pt. able to maintain balance w/o UE support  Dynamic Sitting: Good - accepts moderate challenge;  can maintain balance while picking object off the floor  Static Standing: Fair:  Pt. requires UE support;  may need occasional min A  Dynamic Standing: Fair - accepts minimal challenge;  can maintain balance while turning head/trunk       WHEELCHAIR MOBILITY Daily Assessment    Able to Propel (ft): 150  Assistance: Independent  Surface: Level Surface  Wheelchair Set-up: Manages R Brake, Manages Liz Payor, and Manages R Footrest       LOWER EXTREMITY EXERCISES Daily Assessment   Pt.  Performed supine LE exercises to increase strength & ROM B LEs SUPINE EXERCISES Sets Reps Comments   Ankle Pumps 1 15    Quad Sets 1 15    Glut Sets 1 15    Isometric hip adduction 1 15    Ham sets 1 15    Heel Slides 1 15 L LE   Hip Abduction 1 15 R LE w/ skate & board   Hip IR 1 15    Straight Leg Raise 1 15 L LE   Ankle circles 2 15    Ankle alphabet 1 15          Pain

## 2023-08-16 ENCOUNTER — HOME HEALTH ADMISSION (OUTPATIENT)
Dept: HOME HEALTH SERVICES | Facility: HOME HEALTH | Age: 63
End: 2023-08-16
Payer: COMMERCIAL

## 2023-08-16 PROCEDURE — 94760 N-INVAS EAR/PLS OXIMETRY 1: CPT

## 2023-08-16 PROCEDURE — 97110 THERAPEUTIC EXERCISES: CPT

## 2023-08-16 PROCEDURE — 97530 THERAPEUTIC ACTIVITIES: CPT

## 2023-08-16 PROCEDURE — 94761 N-INVAS EAR/PLS OXIMETRY MLT: CPT

## 2023-08-16 PROCEDURE — 6360000002 HC RX W HCPCS: Performed by: PHYSICAL MEDICINE & REHABILITATION

## 2023-08-16 PROCEDURE — 94640 AIRWAY INHALATION TREATMENT: CPT

## 2023-08-16 PROCEDURE — 1180000000 HC REHAB R&B

## 2023-08-16 PROCEDURE — 94660 CPAP INITIATION&MGMT: CPT

## 2023-08-16 PROCEDURE — 97535 SELF CARE MNGMENT TRAINING: CPT

## 2023-08-16 PROCEDURE — 97542 WHEELCHAIR MNGMENT TRAINING: CPT

## 2023-08-16 PROCEDURE — 6370000000 HC RX 637 (ALT 250 FOR IP): Performed by: PHYSICAL MEDICINE & REHABILITATION

## 2023-08-16 RX ADMIN — FERROUS SULFATE TAB 325 MG (65 MG ELEMENTAL FE) 325 MG: 325 (65 FE) TAB at 17:49

## 2023-08-16 RX ADMIN — CLONAZEPAM 0.5 MG: 0.5 TABLET ORAL at 21:30

## 2023-08-16 RX ADMIN — FLUTICASONE PROPIONATE 1 PUFF: 110 AEROSOL, METERED RESPIRATORY (INHALATION) at 06:06

## 2023-08-16 RX ADMIN — FERROUS SULFATE TAB 325 MG (65 MG ELEMENTAL FE) 325 MG: 325 (65 FE) TAB at 08:13

## 2023-08-16 RX ADMIN — ENOXAPARIN SODIUM 40 MG: 100 INJECTION SUBCUTANEOUS at 08:14

## 2023-08-16 RX ADMIN — ATORVASTATIN CALCIUM 40 MG: 40 TABLET, FILM COATED ORAL at 08:13

## 2023-08-16 RX ADMIN — METOPROLOL TARTRATE 25 MG: 25 TABLET, FILM COATED ORAL at 08:13

## 2023-08-16 RX ADMIN — METOPROLOL TARTRATE 25 MG: 25 TABLET, FILM COATED ORAL at 21:30

## 2023-08-16 RX ADMIN — PAROXETINE HYDROCHLORIDE 20 MG: 20 TABLET, FILM COATED ORAL at 08:13

## 2023-08-16 RX ADMIN — FLUTICASONE PROPIONATE 1 PUFF: 110 AEROSOL, METERED RESPIRATORY (INHALATION) at 17:42

## 2023-08-16 ASSESSMENT — PAIN SCALES - GENERAL: PAINLEVEL_OUTOF10: 0

## 2023-08-16 NOTE — PROGRESS NOTES
OT Daily Note  Time In:    1115     Time Out: 1202        Subjective: \"I'm doing fine. \"  Pain: not expressed  Education: BUE strengthening   Interdisciplinary Communication: with PT regarding discharge    Mobility   Score Comments   Transfer Assist independent Wc propulsion     Strengthening   Pt completed 10 minutes on the ergometer frontwards and backwards with medium resistance to increase UB strength and activity tolerance for integration into functional transfers. Cognition   Pt engaged with electrical snap circuit board completing project #5 with min to mod cuing throughout for problem solving in copying visual model. Assessment: Progressing well, pt is nearing readiness for discharge Friday. Plan: Continue OT POC.      Addison Jerrold Lundborg, OT   8/16/2023

## 2023-08-16 NOTE — PROGRESS NOTES
PHYSICAL THERAPY DAILY NOTE  Time In:  1030  Time Out:  1114  Total Treatment Time:  40 Minutes  Pt. Seen for: AM, Therapeutic Exercise, Transfer Training, and Wheelchair mobility     Subjective: Pt. Brought pictures of his bathroom, living room & kitchen from his apartment. Also reports someone was timur to put the w/c in his wife's car & she will be visiting again tomorrow. Objective:  Precautions: Falls and WB: WBAT R LE in KI for transfers only, WBAT L LE w/ walking boot    COGNITION Daily Assessment    Pt. Less anxious regarding managing w/c in the home       BED/MAT MOBILITY Daily Assessment    Rolling Right: NT  Rolling Left: NT  Supine to Sit: Modified Independent  Sit to Supine: Modified Independent       TRANSFERS Daily Assessment    Sit to Stand: Modified Independent  Stand to Sit: Modified Independent  Transfer Type: Stand Pivot w/ RW  Transfer Assistance: Supervision/Standby Assist  Car Transfers: NT         GAIT Daily Assessment   NA        STEPS/STAIRS Daily Assessment   NA        BALANCE Daily Assessment    Static Sitting: Normal:  Pt. able to maintain balance w/o UE support  Dynamic Sitting: Good - accepts moderate challenge;  can maintain balance while picking object off the floor  Static Standing: Good:  Pt. able to maintain balance w/o UE support;  exhibits some postural sway  Dynamic Standing: Fair - accepts minimal challenge;  can maintain balance while turning head/trunk       WHEELCHAIR MOBILITY Daily Assessment    Able to Propel (ft): 150  Assistance: Independent  Surface: Level Surface  Wheelchair Set-up: Manages R Brake, Manages Salma Youssef, and Manages R Footrest       LOWER EXTREMITY EXERCISES Daily Assessment   Pt.  Performed supine LE exercises to increase strength & ROM L LE SUPINE EXERCISES Sets Reps Comments   Ankle Pumps 1 15     Quad Sets 1 15     Glut Sets 1 15     Isometric hip adduction 1 15     Ham sets 1 15     Heel Slides 1 15 L LE   Hip Abduction 1 15 R LE w/ skate &

## 2023-08-16 NOTE — CARE COORDINATION
Interdisciplinary Wednesday, Team Conference Meeting Notes    Interdisciplinary team conference meeting completed to discuss plan of care. Estimated D/C Date: 8/1/23    Recommended Follow-Up Therapy: Staff has recommended HH (PT/OT/RN/Aide. DME's (/BSC) referral completed with [facility]  Family training with spouse has been completed. Patient will need transport home by [employer].      Communication with family/caregivers: Patient needs are continue to be followed by Dr. Britni Salcedo / Dr. Freya Joshi. Patient has discharge date / plan at this time scheduled for 8/18/23. CM received an update back from [employer]. Patient has been approved to 8/18/23. Patient has a referral completed with [facility] [place]. Placed on follow-up. DME's referral completed with [facility] and DME's has been delivered to patient room. Transpor has been arranged with [employer] @ 11 am.  CM will continue to follow patient:    Name of Ins: [employer]/[facility] [employer]  LOS:9 days    D/C Date: 8/18/23  Fax: [phone]  Policy#: [id number]  Auth#:      CM met with patient and made him aware of the recommendations that were made at the Team Conference. Patient was agreeable with [facility] and requested referral be made to [facility] [place] referral completed. DME's delivered to patient room from [facility] has been arranged with [employer] @ 11 am.  CM will continue to follow and remain available for any needs, concerns or questions that may arise.

## 2023-08-16 NOTE — PROGRESS NOTES
OT DAILY NOTE    Time In:    0700     Time Out: 0739       Functional Mobility   Score Comments   Supine to Sit 6: Independent    Sit to Stand 6: Independent    Transfer Assist 4: Supervision or touching A S with RW     Activities of Daily Living   Score Comments   Eating Independent     Oral Hygiene Independent s/u A   Bathing Independent Joy with sit > stand with RW to wash bottom and víctor area   Upper Body  Dressing Independent Pt setup clothing for himself   Lower Body Dressing Independent pt threaded clothing using reacher, SBA with RW in stance. cuing for donning and doffing KI   Donning/[place] Footwear Supervision or touching assistance assist helping sock over heel due to RLE positioning       Summary of Session: S: \"I'm doing well this morning. \" Agreeable to therapy. Focus of session was on morning ADL routine. Patient was able to complete SPT using RW with SBA. Pain not expressed. Collaborated with PT and confirmed patient is on track to reach goals as documented in the care plan. Continue OT POC with focus on ADL/IADL skills, functional transfers, functional mobility, coordination, strength, static and dynamic balance, and activity tolerance to maximize safety and independence with ADLs and functional transfers. Patient ended session in wc with call remote and phone within reach.        Loco Ellis, OT  8/16/2023

## 2023-08-16 NOTE — PROGRESS NOTES
Patient is currently on CPAP . No respiratory distress is noted.     08/15/23 8291   NIV Type   NIV Started/Stopped On   Equipment Type Resmed CPAP   Mode Auto-PAP   Mask Type Under the nose   Mask Size Medium   Assessment   Pulse 72   Respirations 17   SpO2 95 %   Comfort Level Good   Using Accessory Muscles No   Mask Compliance Good   Skin Assessment Clean, dry, & intact   Settings/Measurements   CPAP/EPAP   (Autoset)   FiO2  21 %   Mask Leak (lpm)   (mask fits well)

## 2023-08-16 NOTE — PROGRESS NOTES
Without hallucinations; often flat affect, but smiling more     Recent Results (from the past 72 hour(s))   Basic Metabolic Panel    Collection Time: 08/15/23  5:35 AM   Result Value Ref Range    Sodium 141 133 - 143 mmol/L    Potassium 4.5 3.5 - 5.1 mmol/L    Chloride 109 101 - 110 mmol/L    CO2 30 21 - 32 mmol/L    Anion Gap 2 2 - 11 mmol/L    Glucose 94 65 - 100 mg/dL    BUN 16 8 - 23 MG/DL    Creatinine 1.00 0.8 - 1.5 MG/DL    Est, Glom Filt Rate >60 >60 ml/min/1.73m2    Calcium 9.1 8.3 - 10.4 MG/DL   CBC    Collection Time: 08/15/23  5:35 AM   Result Value Ref Range    WBC 6.7 4.3 - 11.1 K/uL    RBC 3.29 (L) 4.23 - 5.6 M/uL    Hemoglobin 9.9 (L) 13.6 - 17.2 g/dL    Hematocrit 33.3 (L) 41.1 - 50.3 %    .2 82 - 102 FL    MCH 30.1 26.1 - 32.9 PG    MCHC 29.7 (L) 31.4 - 35.0 g/dL    RDW 20.1 (H) 11.9 - 14.6 %    Platelets 379 206 - 673 K/uL    MPV 10.0 9.4 - 12.3 FL    nRBC 0.00 0.0 - 0.2 K/uL       Assessment:   Rehabilitation Plan  The patient has shown the ability to tolerate and benefit from 3 hours of therapy daily and is being admitted to a comprehensive acute inpatient rehabilitation program consisting of at least 3 hours of combined physical and occupational therapies. - Begin intensive Physical Therapy for a minimum of 1.5 hours a day, at least 5 out of 7 days per week to address bed mobility, transfers, ambulation, strengthening, balance, and endurance. - Begin intensive Occupational Therapy for a minimum of 1.5 hours a day, at least 5 out of 7 days per week to address ADLs (bathing, LE dressing, toileting) and adaptive equipment as needed. - Speech Therapy prn for: dysarthria, impaired communication skills; therapy schedule may be adjusted by MD based on patient's needs. Each of these therapies will be continued as above for the duration of the inpatient rehab stay.     The patient will also require 24-hour skilled rehabilitation nursing for bowel and bladder management, skin care for

## 2023-08-17 PROCEDURE — 97150 GROUP THERAPEUTIC PROCEDURES: CPT

## 2023-08-17 PROCEDURE — 6360000002 HC RX W HCPCS: Performed by: PHYSICAL MEDICINE & REHABILITATION

## 2023-08-17 PROCEDURE — 6370000000 HC RX 637 (ALT 250 FOR IP): Performed by: PHYSICAL MEDICINE & REHABILITATION

## 2023-08-17 PROCEDURE — 97542 WHEELCHAIR MNGMENT TRAINING: CPT

## 2023-08-17 PROCEDURE — 97110 THERAPEUTIC EXERCISES: CPT

## 2023-08-17 PROCEDURE — 94760 N-INVAS EAR/PLS OXIMETRY 1: CPT

## 2023-08-17 PROCEDURE — 1180000000 HC REHAB R&B

## 2023-08-17 PROCEDURE — 97530 THERAPEUTIC ACTIVITIES: CPT

## 2023-08-17 PROCEDURE — 94640 AIRWAY INHALATION TREATMENT: CPT

## 2023-08-17 RX ADMIN — PAROXETINE HYDROCHLORIDE 20 MG: 20 TABLET, FILM COATED ORAL at 07:45

## 2023-08-17 RX ADMIN — FLUTICASONE PROPIONATE 1 PUFF: 110 AEROSOL, METERED RESPIRATORY (INHALATION) at 05:58

## 2023-08-17 RX ADMIN — FERROUS SULFATE TAB 325 MG (65 MG ELEMENTAL FE) 325 MG: 325 (65 FE) TAB at 16:44

## 2023-08-17 RX ADMIN — ATORVASTATIN CALCIUM 40 MG: 40 TABLET, FILM COATED ORAL at 07:45

## 2023-08-17 RX ADMIN — METOPROLOL TARTRATE 25 MG: 25 TABLET, FILM COATED ORAL at 07:45

## 2023-08-17 RX ADMIN — FLUTICASONE PROPIONATE 1 PUFF: 110 AEROSOL, METERED RESPIRATORY (INHALATION) at 17:57

## 2023-08-17 RX ADMIN — FERROUS SULFATE TAB 325 MG (65 MG ELEMENTAL FE) 325 MG: 325 (65 FE) TAB at 07:45

## 2023-08-17 RX ADMIN — METOPROLOL TARTRATE 25 MG: 25 TABLET, FILM COATED ORAL at 21:46

## 2023-08-17 RX ADMIN — CLONAZEPAM 0.5 MG: 0.5 TABLET ORAL at 21:45

## 2023-08-17 RX ADMIN — ENOXAPARIN SODIUM 40 MG: 100 INJECTION SUBCUTANEOUS at 07:44

## 2023-08-17 NOTE — PROGRESS NOTES
OT DISCHARGE NOTE    Time In:      5145   Time Out:   0740      Goals:  GOALS:  LTG 1: Patient will complete UB dressing with independence using AE/DME PRN within 10 days. Goal progressing 8/14/23,   GOAL MET 8/17/2023     LTG 2: Patient will complete LB dressing with supervision using AE/DME PRN within 10 days. Goal progressing 8/14/23, upgraded to [address] MET 8/17/2023     LTG 3: Patient will don footwear with setup assist using AE/DME PRN within 10 days. Goal progressing 8/14/23,   GOAL MET 8/17/2023     LTG 4: Patient will complete bathing with supervision using AE/DME PRN within 10 days. Goal progressing 8/14/23, upgraded to Joy  GOAL MET 8/17/2023     LTG 5: Patient will complete toileting with supervision using AE/DME PRN within 10 days. Goal progressing 8/14/23, upgraded to Joy  GOAL MET 8/17/2023      *Initial Assessment is the worst a patient did on that activity in the first 72 hrs of being admitted as gathered by the OT   *Discharge Assessment is an average of the patient's performance over the last 48 hrs   Initial Assessment Discharge Assessment 8/17/2023   Eating Independent:    Independent:      Oral Hygiene Setup or clean-up assistance:   s/u A Independent:     Bathing Substantial/maximal assistance:  CGA in stance with RW while pt washed bottom and víctor area Independent:  Joy with sit > stand with RW to wash bottom and víctor area   Upper Body Dressing Setup or clean-up assistance:  S/U A Independent:  Pt setup clothing for himself   Lower Body Dressing Dependent:  assist to thread pants, pt able to pull over waist with cuing for safety and technique Independent:     Donning/[place] Footwear Dependent:  secondary to time constraints Independent: Toilet Transfer Partial/moderate assistance:  Alfredo with RW Independent:      Toilet Hygiene Substantial/maximal assistance:  Assist for balance at grab bar, cues for problem solving Independent:     BIMS: 14/15    Precautions at Discharge: Falls and

## 2023-08-17 NOTE — PROGRESS NOTES
08/16/23 2149   NIV Type   $NIV $Daily Charge   NIV Started/Stopped On   Equipment Type Resmed   Mode Auto-PAP   Mask Type Under the nose   Mask Size Medium   Assessment   Pulse 74   Respirations 16   SpO2 98 %   Comfort Level Good   Using Accessory Muscles No   Mask Compliance Good   Settings/Measurements   PIP Observed 10 cm H20   EPAP Min 6 cmH2O   EPAP Max 12 cmH2O   Mask Leak (lpm)   (no leak noted)   Patient's Home Machine No   Alarm Settings   Alarms On Y   Apnea (secs) 20 secs

## 2023-08-17 NOTE — PROGRESS NOTES
Inpatient Rehab Daily Progress Note    P      Admit Date: 8/7/2023      Chief Complaint : Impaired ability to ambulate, transfer, and carryout self care tasks d/t a right distal femur fracture post ORIF in a knee immobilizer and and left 5th metatarsal fracture, both injuries WBAT. Admitting Diagnosis:   History of femur fracture [Z87.81]    Secondary diagnosis:  Post op pain  Asthma  HLD  Anxiety  RICCI on a CPAP      Date of Evaluation: August 17, 2023    Daily/Subjective: Pt seen and evaluated today. He was in a better mood again and smiling some, and he has affirmed this observation. He had a flat affect for the first several days he was here. NAEO and he reports sleeping fine. He is eating and drinking well, and continent of bowel and bladder. His pain is controlled on current medications and he has been educated on peritherapy dosing as needed. Pt said he didn't have any pain again when I saw him today. He said therapies are going well and he reported doing better today. He will DC home tomorrow with his wife.          Current Facility-Administered Medications   Medication Dose Route Frequency    acetaminophen (TYLENOL) suppository 650 mg  650 mg Rectal Q6H PRN    atorvastatin (LIPITOR) tablet 40 mg  40 mg Oral Daily    Benzocaine-Menthol (CEPACOL SORE THROAT) 15-2.6 MG lozenge 1 lozenge  1 lozenge Oral Q2H PRN    calcium carbonate (TUMS) chewable tablet 500 mg  500 mg Oral TID PRN    clonazePAM (KLONOPIN) tablet 0.5 mg  0.5 mg Oral Nightly    dextrose 10 % infusion   IntraVENous Continuous PRN    dextrose bolus 10% 125 mL  125 mL IntraVENous PRN    Or    dextrose bolus 10% 250 mL  250 mL IntraVENous PRN    ferrous sulfate (IRON 325) tablet 325 mg  325 mg Oral BID WC    glucose chewable tablet 16 g  4 tablet Oral PRN    glucagon (rDNA) injection 1 mg  1 mg SubCUTAneous PRN    metoprolol tartrate (LOPRESSOR) tablet 50 mg  50 mg Oral Q6H PRN    metoprolol tartrate (LOPRESSOR) tablet 25 mg  25 mg Oral BID

## 2023-08-18 VITALS
RESPIRATION RATE: 18 BRPM | TEMPERATURE: 98.4 F | OXYGEN SATURATION: 97 % | DIASTOLIC BLOOD PRESSURE: 66 MMHG | BODY MASS INDEX: 25.96 KG/M2 | HEART RATE: 76 BPM | HEIGHT: 68 IN | WEIGHT: 171.3 LBS | SYSTOLIC BLOOD PRESSURE: 111 MMHG

## 2023-08-18 PROCEDURE — 6360000002 HC RX W HCPCS: Performed by: PHYSICAL MEDICINE & REHABILITATION

## 2023-08-18 PROCEDURE — 6370000000 HC RX 637 (ALT 250 FOR IP): Performed by: PHYSICAL MEDICINE & REHABILITATION

## 2023-08-18 RX ORDER — FERROUS SULFATE 325(65) MG
325 TABLET ORAL 2 TIMES DAILY WITH MEALS
Qty: 60 TABLET | Refills: 0 | Status: SHIPPED | OUTPATIENT
Start: 2023-08-18 | End: 2023-09-17

## 2023-08-18 RX ORDER — ASCORBIC ACID 250 MG
250 TABLET ORAL 2 TIMES DAILY
Qty: 60 TABLET | Refills: 0 | Status: SHIPPED | OUTPATIENT
Start: 2023-08-18 | End: 2023-09-17

## 2023-08-18 RX ORDER — ASPIRIN 325 MG
325 TABLET ORAL DAILY
Qty: 30 TABLET | Refills: 0 | Status: SHIPPED | OUTPATIENT
Start: 2023-08-18 | End: 2023-09-17

## 2023-08-18 RX ADMIN — PAROXETINE HYDROCHLORIDE 20 MG: 20 TABLET, FILM COATED ORAL at 08:28

## 2023-08-18 RX ADMIN — METOPROLOL TARTRATE 25 MG: 25 TABLET, FILM COATED ORAL at 08:28

## 2023-08-18 RX ADMIN — ATORVASTATIN CALCIUM 40 MG: 40 TABLET, FILM COATED ORAL at 08:28

## 2023-08-18 RX ADMIN — ENOXAPARIN SODIUM 40 MG: 100 INJECTION SUBCUTANEOUS at 08:28

## 2023-08-18 RX ADMIN — FERROUS SULFATE TAB 325 MG (65 MG ELEMENTAL FE) 325 MG: 325 (65 FE) TAB at 08:28

## 2023-08-18 NOTE — DISCHARGE SUMMARY
[facility]  Inpatient Rehab Program      PHYSICAL MEDICINE & REHABILITATION DISCHARGE SUMMARY     Date: 8/18/2023  Admission Date: 8/7/2023  Discharge Date: 8/18/2023    Surgeon: Dr. Rusty Reynoso MD (Orthopedics)   Primary Care Provider: Xavier Romero DO    Admission Condition: stable  Discharged Condition: stable    Principal Problem:    History of femur fracture  Active Problems:    Sleep apnea    Bilateral hearing loss    Chronic fatigue    Mild intermittent asthma    Anxiety    High cholesterol    ROE (acute kidney injury) ([address])    Blood loss anemia    SVT (supraventricular tachycardia) (HCC)    Urinary retention    Encounter for rehabilitation    Impaired functional mobility, balance, gait, and endurance    Decreased independence with activities of daily living  Resolved Problems:    * No resolved hospital problems. *    Eri Garrison is a 60 y.o. male patient at [facility] who was admitted to [address] [place] [address] on 8/7/2023 by Patricio Rdz MD of Physical Medicine and Rehab service with below-mentioned medical history    HPI: Per Pt and chart documents. Eri Garrison is a 60 y.o. RHD male with medical history of RICCI, on CPAP, mild intermittent asthma, hypercholesterolemia, anxiety, and vitamin D deficiency presented to the [facility] ED after a fall 7/31/23. He was found to have closed comminuted right distal femur fracture. Ortho was consulted and he is s/p right femur retrograde nail on 7/31/23 by Rusty Reynoso MD (Ortho). He also appears to have a Briseno fracture (5th metatarsal fracture) of the left foot and Ortho recommend conservative management with a walking boot. Rapid response was called 8/1 morning and patient was found to be in supraventricular tachycardia, heart rate was in the 170s. 6 mg of adenosine and 5 mg of metoprolol were given. Patient had another episode of SVT the next day. Echocardiogram was unremarkable.  Cardiology was consulted and organomegaly palpated. Neuromuscular:  BUEs: 4+ to 5/5 throughout all major muscle groups. LLE: 5/5 hip flexors and knee f/e; 4/5 ankle PF/DF limited by pain wearing a walking boot. RLE: 5/5 ankle PF/DF, knee immobilizer covering proximal to ankle. Sensation intact to light touch globally without reported paresthesias. Skin:  Intact, dry, good skin turgor, age related changes present   Edema: Minimal/none distal RLE (improved) and trace/none distal LLE. R thigh covered with a large dressing covered with an ACE wrap and a knee immobilizer. Incision:  covered, clean, dry, and intact      Psych: Patient was oriented to person, place, and time. Without hallucinations; often flat affect, but smiling more     Incision(s): covered with cdi dressing, none/minimal pain,  no drainage    DISCHARGE INSTRUCTIONS:   1. Diet: regular diet with thin liquids  2. Activity: activity as tolerated and no driving until cleared by PCP or Orthopedics. RLE in a knee immobilizer (WBAT) and and left 5th metatarsal fracture in a walking boot (WBAT for transfers)  3. Incision / wound care: keep wound clean and dry, wash with mild soap and water, pat dry. No submersion for 2 more weeks.     Current Discharge Medication List        START taking these medications    Details   ferrous sulfate (IRON 325) 325 (65 Fe) MG tablet Take 1 tablet by mouth 2 times daily (with meals)  Qty: 60 tablet, Refills: 0      ascorbic acid (V-R VITAMIN C) 250 MG tablet Take 1 tablet by mouth 2 times daily Take with iron pill  Qty: 60 tablet, Refills: 0      aspirin (AMELIA ASPIRIN) 325 MG tablet Take 1 tablet by mouth daily  Qty: 30 tablet, Refills: 0           CONTINUE these medications which have NOT CHANGED    Details   metoprolol tartrate (LOPRESSOR) 25 MG tablet Take 1 tablet by mouth 2 times daily  Qty: 60 tablet, Refills: 3      PARoxetine (PAXIL) 40 MG tablet Take 1 tablet by mouth daily  Qty: 60 tablet, Refills: 1    Associated Diagnoses: Anxiety

## 2023-08-18 NOTE — PLAN OF CARE
Problem: Safety - Adult  Goal: Free from fall injury  Outcome: Progressing     Problem: ABCDS Injury Assessment  Goal: Absence of physical injury  Outcome: Progressing     Problem: Pain  Goal: Verbalizes/displays adequate comfort level or baseline comfort level  Outcome: Progressing  Flowsheets (Taken 8/18/2023 4755)  Verbalizes/displays adequate comfort level or baseline comfort level: Encourage patient to monitor pain and request assistance     Problem: Skin/Tissue Integrity  Goal: Absence of new skin breakdown  Description: 1. Monitor for areas of redness and/or skin breakdown  2. Assess vascular access sites hourly  3. Every 4-6 hours minimum:  Change oxygen saturation probe site  4. Every 4-6 hours:  If on nasal continuous positive airway pressure, respiratory therapy assess nares and determine need for appliance change or resting period.   Outcome: Progressing     Problem: Respiratory - Adult  Goal: Achieves optimal ventilation and oxygenation  Outcome: Progressing  Flowsheets (Taken 8/18/2023 0640)  Achieves optimal ventilation and oxygenation: Assess for changes in respiratory status

## 2023-08-18 NOTE — CARE COORDINATION
Patient has discharge orders for today. Referral has been completed with [facility]. Patient has been accepted and approved. DME's (CANDY/NANI) delivered to patient room from [facility] .  Patient has requested transport home. Transport has been arranged with [facility] @ 11 am.  Patient has been made aware. Patient has met all treatment goals / milestones. CM will continue to follow and remain available for any needs, concerns or questions that may arise. 08/08/23 0990   Service Assessment   Patient Orientation Alert and Oriented;Person;Place;Situation;Self   Cognition Alert   History Provided By Patient;Medical Record   Primary Caregiver Self   Support Systems Spouse/Significant Other   Patient's Healthcare Decision Maker is: Legal Next of Kin   PCP Verified by CM Yes   Last Visit to PCP Within last 3 months   Prior Functional Level Independent in ADLs/IADLs   Current Functional Level Assistance with the following:;Bathing;Dressing; Toileting;Mobility   Can patient return to prior living arrangement Yes   Ability to make needs known: Good   Family able to assist with home care needs: Yes   Would you like for me to discuss the discharge plan with any other family members/significant others, and if so, who?  Yes   Financial Resources Medicare   Community Resources ECF/Home Care   Social/Functional History   Lives With Spouse   Type of Home Apartment   Home Layout One level   Home Access Level entry   Bathroom Shower/Tub Tub/Shower unit   Bathroom Toilet Standard   Bathroom Accessibility Accessible   Home Equipment None   Receives Help From Family;Home health   ADL Assistance Needs assistance   Toileting Needs assistance   Homemaking Assistance Needs assistance   Homemaking Responsibilities Yes   Ambulation Assistance Needs assistance   Transfer Assistance Needs assistance   Active  Yes   Mode of Transportation Car   Occupation Retired   Discharge Planning   Type of [facility]

## 2023-08-19 ENCOUNTER — HOME CARE VISIT (OUTPATIENT)
Dept: SCHEDULING | Facility: HOME HEALTH | Age: 63
End: 2023-08-19
Payer: COMMERCIAL

## 2023-08-19 VITALS
TEMPERATURE: 97.1 F | OXYGEN SATURATION: 98 % | HEART RATE: 80 BPM | RESPIRATION RATE: 16 BRPM | SYSTOLIC BLOOD PRESSURE: 104 MMHG | DIASTOLIC BLOOD PRESSURE: 68 MMHG

## 2023-08-19 PROCEDURE — G0151 HHCP-SERV OF PT,EA 15 MIN: HCPCS

## 2023-08-19 ASSESSMENT — ENCOUNTER SYMPTOMS
PAIN LOCATION - PAIN QUALITY: ACHE
DYSPNEA ACTIVITY LEVEL: AFTER AMBULATING LESS THAN 10 FT

## 2023-08-21 ENCOUNTER — HOME CARE VISIT (OUTPATIENT)
Dept: SCHEDULING | Facility: HOME HEALTH | Age: 63
End: 2023-08-21
Payer: COMMERCIAL

## 2023-08-21 ENCOUNTER — TELEPHONE (OUTPATIENT)
Dept: ORTHOPEDIC SURGERY | Age: 63
End: 2023-08-21

## 2023-08-21 ENCOUNTER — TELEPHONE (OUTPATIENT)
Dept: INTERNAL MEDICINE CLINIC | Facility: CLINIC | Age: 63
End: 2023-08-21

## 2023-08-21 VITALS
OXYGEN SATURATION: 98 % | DIASTOLIC BLOOD PRESSURE: 68 MMHG | RESPIRATION RATE: 16 BRPM | TEMPERATURE: 97.1 F | HEART RATE: 80 BPM | SYSTOLIC BLOOD PRESSURE: 114 MMHG

## 2023-08-21 VITALS
OXYGEN SATURATION: 99 % | SYSTOLIC BLOOD PRESSURE: 104 MMHG | DIASTOLIC BLOOD PRESSURE: 60 MMHG | TEMPERATURE: 97.6 F | HEART RATE: 66 BPM | RESPIRATION RATE: 16 BRPM

## 2023-08-21 PROCEDURE — G0151 HHCP-SERV OF PT,EA 15 MIN: HCPCS

## 2023-08-21 PROCEDURE — G0152 HHCP-SERV OF OT,EA 15 MIN: HCPCS

## 2023-08-21 ASSESSMENT — ENCOUNTER SYMPTOMS: PAIN LOCATION - PAIN QUALITY: ACHE

## 2023-08-21 NOTE — TELEPHONE ENCOUNTER
Adithya Almaraz called back with HH. Will removed fabio today, Will place steri strips over incision. Advised not to clean with hydrogen peroxide.  She will upload picture of site after removal.

## 2023-08-21 NOTE — TELEPHONE ENCOUNTER
Care Transitions Initial Follow Up Call    Outreach made within 2 business days of discharge: Yes    Patient: Mandeep Hennessy Patient : 1960   MRN: [id number]  Reason for Admission: There are no discharge diagnoses documented for the most recent discharge.   Discharge Date: 23       Spoke with: stewart     Discharge department/facility:  [address] [place] [address]

## 2023-08-22 ENCOUNTER — HOME CARE VISIT (OUTPATIENT)
Dept: SCHEDULING | Facility: HOME HEALTH | Age: 63
End: 2023-08-22
Payer: COMMERCIAL

## 2023-08-22 PROCEDURE — G0155 HHCP-SVS OF CSW,EA 15 MIN: HCPCS

## 2023-08-23 ENCOUNTER — HOME CARE VISIT (OUTPATIENT)
Dept: SCHEDULING | Facility: HOME HEALTH | Age: 63
End: 2023-08-23
Payer: COMMERCIAL

## 2023-08-23 VITALS
HEART RATE: 68 BPM | SYSTOLIC BLOOD PRESSURE: 110 MMHG | TEMPERATURE: 98.1 F | RESPIRATION RATE: 17 BRPM | DIASTOLIC BLOOD PRESSURE: 72 MMHG | OXYGEN SATURATION: 98 %

## 2023-08-23 PROCEDURE — G0157 HHC PT ASSISTANT EA 15: HCPCS

## 2023-08-23 ASSESSMENT — ENCOUNTER SYMPTOMS: PAIN LOCATION - PAIN QUALITY: ACHING

## 2023-08-25 ENCOUNTER — HOME CARE VISIT (OUTPATIENT)
Dept: SCHEDULING | Facility: HOME HEALTH | Age: 63
End: 2023-08-25
Payer: COMMERCIAL

## 2023-08-25 PROCEDURE — G0158 HHC OT ASSISTANT EA 15: HCPCS

## 2023-08-26 VITALS
HEART RATE: 60 BPM | SYSTOLIC BLOOD PRESSURE: 120 MMHG | OXYGEN SATURATION: 95 % | DIASTOLIC BLOOD PRESSURE: 78 MMHG | RESPIRATION RATE: 16 BRPM | TEMPERATURE: 98.1 F

## 2023-08-30 ENCOUNTER — HOME CARE VISIT (OUTPATIENT)
Dept: SCHEDULING | Facility: HOME HEALTH | Age: 63
End: 2023-08-30
Payer: COMMERCIAL

## 2023-08-30 VITALS
DIASTOLIC BLOOD PRESSURE: 78 MMHG | RESPIRATION RATE: 16 BRPM | TEMPERATURE: 97.7 F | OXYGEN SATURATION: 100 % | HEART RATE: 82 BPM | SYSTOLIC BLOOD PRESSURE: 108 MMHG

## 2023-08-30 VITALS
RESPIRATION RATE: 17 BRPM | OXYGEN SATURATION: 98 % | TEMPERATURE: 98.1 F | DIASTOLIC BLOOD PRESSURE: 72 MMHG | SYSTOLIC BLOOD PRESSURE: 118 MMHG | HEART RATE: 68 BPM

## 2023-08-30 PROCEDURE — G0157 HHC PT ASSISTANT EA 15: HCPCS

## 2023-08-30 PROCEDURE — G0158 HHC OT ASSISTANT EA 15: HCPCS

## 2023-09-01 ENCOUNTER — HOME CARE VISIT (OUTPATIENT)
Dept: SCHEDULING | Facility: HOME HEALTH | Age: 63
End: 2023-09-01
Payer: COMMERCIAL

## 2023-09-01 VITALS
DIASTOLIC BLOOD PRESSURE: 80 MMHG | RESPIRATION RATE: 16 BRPM | TEMPERATURE: 98.5 F | SYSTOLIC BLOOD PRESSURE: 120 MMHG | OXYGEN SATURATION: 97 % | HEART RATE: 72 BPM

## 2023-09-01 PROCEDURE — G0158 HHC OT ASSISTANT EA 15: HCPCS

## 2023-09-06 ENCOUNTER — HOME CARE VISIT (OUTPATIENT)
Dept: SCHEDULING | Facility: HOME HEALTH | Age: 63
End: 2023-09-06
Payer: COMMERCIAL

## 2023-09-06 VITALS
TEMPERATURE: 98 F | DIASTOLIC BLOOD PRESSURE: 68 MMHG | RESPIRATION RATE: 16 BRPM | SYSTOLIC BLOOD PRESSURE: 100 MMHG | HEART RATE: 66 BPM | OXYGEN SATURATION: 98 %

## 2023-09-06 PROCEDURE — G0158 HHC OT ASSISTANT EA 15: HCPCS

## 2023-09-07 ENCOUNTER — HOME CARE VISIT (OUTPATIENT)
Dept: SCHEDULING | Facility: HOME HEALTH | Age: 63
End: 2023-09-07
Payer: COMMERCIAL

## 2023-09-07 VITALS
OXYGEN SATURATION: 98 % | HEART RATE: 72 BPM | SYSTOLIC BLOOD PRESSURE: 112 MMHG | DIASTOLIC BLOOD PRESSURE: 70 MMHG | TEMPERATURE: 98.2 F | RESPIRATION RATE: 16 BRPM

## 2023-09-07 PROCEDURE — G0157 HHC PT ASSISTANT EA 15: HCPCS

## 2023-09-08 ENCOUNTER — HOME CARE VISIT (OUTPATIENT)
Dept: SCHEDULING | Facility: HOME HEALTH | Age: 63
End: 2023-09-08
Payer: COMMERCIAL

## 2023-09-08 VITALS
SYSTOLIC BLOOD PRESSURE: 100 MMHG | HEART RATE: 65 BPM | RESPIRATION RATE: 16 BRPM | OXYGEN SATURATION: 98 % | TEMPERATURE: 98.2 F | DIASTOLIC BLOOD PRESSURE: 62 MMHG

## 2023-09-08 PROCEDURE — G0158 HHC OT ASSISTANT EA 15: HCPCS

## 2023-09-11 ENCOUNTER — HOME CARE VISIT (OUTPATIENT)
Dept: SCHEDULING | Facility: HOME HEALTH | Age: 63
End: 2023-09-11
Payer: COMMERCIAL

## 2023-09-11 PROCEDURE — G0152 HHCP-SERV OF OT,EA 15 MIN: HCPCS

## 2023-09-12 ENCOUNTER — OFFICE VISIT (OUTPATIENT)
Dept: ORTHOPEDIC SURGERY | Age: 63
End: 2023-09-12

## 2023-09-12 VITALS
DIASTOLIC BLOOD PRESSURE: 78 MMHG | HEART RATE: 57 BPM | SYSTOLIC BLOOD PRESSURE: 122 MMHG | RESPIRATION RATE: 16 BRPM | OXYGEN SATURATION: 98 % | TEMPERATURE: 97.4 F

## 2023-09-12 DIAGNOSIS — S72.491A CLOSED COMMINUTED INTRA-ARTICULAR FRACTURE OF DISTAL FEMUR, RIGHT, INITIAL ENCOUNTER (HCC): Primary | ICD-10-CM

## 2023-09-12 PROCEDURE — 99024 POSTOP FOLLOW-UP VISIT: CPT | Performed by: ORTHOPAEDIC SURGERY

## 2023-09-12 NOTE — PROGRESS NOTES
Progress Note    Patient: Micaela Mares MRN: [id number]  SSN: [id number]    YOB: 1960  Age: 60 y.o. Sex: male        9/12/2023      Subjective:     Patient is now about 6 weeks out from retrograde intramedullary nail fixation of a rather complex distal femur fracture. This was an intercondylar supracondylar distal femur fracture did have some comminution. He also has a left base of the fifth metatarsal avulsion fracture that was treated nonoperatively. He was getting home health therapy for a little while he says but then they sort of held off just because he could not really do a lot as far as his weightbearing. He feels like he is feeling better than he was at his last visit    Objective: There were no vitals filed for this visit. Physical Exam:     Skin - incision is well healed with no redness or drainage  Motor and sensory function intact in RIGHT LOWER extremity  Pulses palpable in RIGHT LOWER extremity     XRAY FINDINGS:  Ioigimapqhq-[id number]-id right distal femur fracture, findings-true AP and lateral views of the right knee and femur shows that the right distal femur fracture shows early evidence of callus formation and is primary fracture lines. The overall alignment is satisfactory on both the AP and lateral projection. The hardware is intact with no evidence of loosening or failure. Impression #healing well aligned right distal femur fracture    Assessment:     #1-healing well aligned right intra-articular supracondylar distal femur fracture, #2-left fifth metatarsal avulsion fracture    Plan: At this point we can really sort of change his restrictions and basically remove them. He now can be full weightbearing as tolerated on the left lower extremity. He can use the boot if this helps him but he does not have to use the boot if he is comfortable weightbearing without it he does not have to use this.   On his right lower extremity he can be full active and

## 2023-09-13 ENCOUNTER — HOME CARE VISIT (OUTPATIENT)
Dept: SCHEDULING | Facility: HOME HEALTH | Age: 63
End: 2023-09-13
Payer: COMMERCIAL

## 2023-09-13 VITALS
DIASTOLIC BLOOD PRESSURE: 72 MMHG | HEART RATE: 72 BPM | RESPIRATION RATE: 18 BRPM | TEMPERATURE: 97.4 F | SYSTOLIC BLOOD PRESSURE: 124 MMHG | OXYGEN SATURATION: 98 %

## 2023-09-13 PROCEDURE — G0151 HHCP-SERV OF PT,EA 15 MIN: HCPCS

## 2023-09-13 ASSESSMENT — ENCOUNTER SYMPTOMS: PAIN LOCATION - PAIN QUALITY: STRONG ACHE

## 2023-09-18 ENCOUNTER — HOME CARE VISIT (OUTPATIENT)
Dept: SCHEDULING | Facility: HOME HEALTH | Age: 63
End: 2023-09-18
Payer: COMMERCIAL

## 2023-09-18 VITALS
TEMPERATURE: 98.2 F | RESPIRATION RATE: 17 BRPM | DIASTOLIC BLOOD PRESSURE: 80 MMHG | OXYGEN SATURATION: 98 % | SYSTOLIC BLOOD PRESSURE: 120 MMHG | HEART RATE: 78 BPM

## 2023-09-18 PROCEDURE — G0157 HHC PT ASSISTANT EA 15: HCPCS

## 2023-09-18 ASSESSMENT — ENCOUNTER SYMPTOMS: PAIN LOCATION - PAIN QUALITY: ACHING

## 2023-09-20 ENCOUNTER — HOME CARE VISIT (OUTPATIENT)
Dept: SCHEDULING | Facility: HOME HEALTH | Age: 63
End: 2023-09-20
Payer: COMMERCIAL

## 2023-09-20 VITALS
DIASTOLIC BLOOD PRESSURE: 68 MMHG | HEART RATE: 74 BPM | OXYGEN SATURATION: 97 % | RESPIRATION RATE: 17 BRPM | SYSTOLIC BLOOD PRESSURE: 112 MMHG | TEMPERATURE: 98.2 F

## 2023-09-20 PROCEDURE — G0157 HHC PT ASSISTANT EA 15: HCPCS

## 2023-09-20 ASSESSMENT — ENCOUNTER SYMPTOMS: PAIN LOCATION - PAIN QUALITY: ACHING

## 2023-09-25 ENCOUNTER — HOME CARE VISIT (OUTPATIENT)
Dept: SCHEDULING | Facility: HOME HEALTH | Age: 63
End: 2023-09-25
Payer: COMMERCIAL

## 2023-09-28 ENCOUNTER — HOME CARE VISIT (OUTPATIENT)
Dept: SCHEDULING | Facility: HOME HEALTH | Age: 63
End: 2023-09-28
Payer: COMMERCIAL

## 2023-09-28 VITALS
HEART RATE: 76 BPM | TEMPERATURE: 97.3 F | RESPIRATION RATE: 19 BRPM | SYSTOLIC BLOOD PRESSURE: 126 MMHG | DIASTOLIC BLOOD PRESSURE: 68 MMHG

## 2023-09-28 PROCEDURE — G0157 HHC PT ASSISTANT EA 15: HCPCS

## 2023-09-28 ASSESSMENT — ENCOUNTER SYMPTOMS: PAIN LOCATION - PAIN QUALITY: ACHE

## 2023-10-02 ENCOUNTER — TELEPHONE (OUTPATIENT)
Dept: ORTHOPEDIC SURGERY | Age: 63
End: 2023-10-02

## 2023-10-02 ENCOUNTER — HOME CARE VISIT (OUTPATIENT)
Dept: SCHEDULING | Facility: HOME HEALTH | Age: 63
End: 2023-10-02
Payer: COMMERCIAL

## 2023-10-02 VITALS
DIASTOLIC BLOOD PRESSURE: 70 MMHG | OXYGEN SATURATION: 98 % | SYSTOLIC BLOOD PRESSURE: 112 MMHG | HEART RATE: 78 BPM | TEMPERATURE: 98.1 F | RESPIRATION RATE: 17 BRPM

## 2023-10-02 DIAGNOSIS — S72.491A CLOSED COMMINUTED INTRA-ARTICULAR FRACTURE OF DISTAL FEMUR, RIGHT, INITIAL ENCOUNTER (HCC): Primary | ICD-10-CM

## 2023-10-02 PROCEDURE — G0157 HHC PT ASSISTANT EA 15: HCPCS

## 2023-10-02 ASSESSMENT — ENCOUNTER SYMPTOMS: PAIN LOCATION - PAIN QUALITY: ACHING

## 2023-10-02 NOTE — TELEPHONE ENCOUNTER
Laura Mendosa is calling to get a referral for outpatient PT sent to [facility] rehab.  Please put an order in the system

## 2023-10-03 ENCOUNTER — OFFICE VISIT (OUTPATIENT)
Dept: INTERNAL MEDICINE CLINIC | Facility: CLINIC | Age: 63
End: 2023-10-03
Payer: COMMERCIAL

## 2023-10-03 VITALS
SYSTOLIC BLOOD PRESSURE: 98 MMHG | TEMPERATURE: 97.8 F | DIASTOLIC BLOOD PRESSURE: 85 MMHG | HEART RATE: 65 BPM | WEIGHT: 164 LBS | HEIGHT: 68 IN | OXYGEN SATURATION: 95 % | BODY MASS INDEX: 24.86 KG/M2

## 2023-10-03 DIAGNOSIS — D50.0 BLOOD LOSS ANEMIA: ICD-10-CM

## 2023-10-03 DIAGNOSIS — S72.401D CLOSED FRACTURE OF DISTAL END OF RIGHT FEMUR WITH ROUTINE HEALING, UNSPECIFIED FRACTURE MORPHOLOGY, SUBSEQUENT ENCOUNTER: Primary | ICD-10-CM

## 2023-10-03 DIAGNOSIS — S72.491A CLOSED COMMINUTED INTRA-ARTICULAR FRACTURE OF DISTAL FEMUR, RIGHT, INITIAL ENCOUNTER (HCC): ICD-10-CM

## 2023-10-03 PROCEDURE — 99213 OFFICE O/P EST LOW 20 MIN: CPT | Performed by: INTERNAL MEDICINE

## 2023-10-03 RX ORDER — FERROUS SULFATE 325(65) MG
325 TABLET ORAL 2 TIMES DAILY WITH MEALS
Qty: 60 TABLET | Refills: 0 | Status: SHIPPED | OUTPATIENT
Start: 2023-10-03 | End: 2023-11-02

## 2023-10-03 RX ORDER — ASCORBIC ACID 250 MG
250 TABLET ORAL 2 TIMES DAILY
Qty: 60 TABLET | Refills: 0 | Status: SHIPPED | OUTPATIENT
Start: 2023-10-03 | End: 2023-11-02

## 2023-10-03 NOTE — PROGRESS NOTES
Gilberto Diaz (: 1960) is a 60 y.o. male, here for evaluation of the following chief complaint(s):  Knee Pain (Had surgery 2 months ago / having some pain on the right side of the knee / patient has a follow up with the surgeon on the 24th of Oct . )       ASSESSMENT/PLAN:  1. Closed fracture of distal end of right femur with routine healing, unspecified fracture morphology, subsequent encounter  2. Closed comminuted intra-articular fracture of distal femur, right, initial encounter [facility])  Assessment & Plan:   Patient has recovered from initial injury and surgical correction. He was at long-term rehab and is now at home. Recent communication from home health to Orthopedics for continued home health. This office concurs. 3. Blood loss anemia  -     ferrous sulfate (IRON 325) 325 (65 Fe) MG tablet; Take 1 tablet by mouth 2 times daily (with meals), Disp-60 tablet, R-0Normal  -     ascorbic acid (V-R VITAMIN C) 250 MG tablet; Take 1 tablet by mouth 2 times daily Take with iron pill, Disp-60 tablet, R-0Normal             SUBJECTIVE/OBJECTIVE:  HPI: Patient is a pleasant 79-year-old man with medical history significant for femur fracture status post surgical repair, mild intermittent asthma, sleep apnea presenting for routine follow-up visit. Patient recently discharged from long-term rehab facility where he was recovering following repair of right femur fracture. Patient doing well currently. He is able to drive without concern. Utilizing a walker for ambulatory support. Working with home physical therapy. Recent communication from physical therapy team recommending continued home physical therapy. Return to clinic in 3 months time. Recent labs reviewed without significant concern.   Social History     Tobacco Use    Smoking status: Never    Smokeless tobacco: Never   Vaping Use    Vaping Use: Never used   Substance Use Topics    Alcohol use: Yes    Drug use: Never     Vitals:    10/03/23 1034

## 2023-10-03 NOTE — ASSESSMENT & PLAN NOTE
Patient has recovered from initial injury and surgical correction. He was at long-term rehab and is now at home. Recent communication from home health to Orthopedics for continued home health. This office concurs.

## 2023-10-04 ENCOUNTER — HOME CARE VISIT (OUTPATIENT)
Dept: SCHEDULING | Facility: HOME HEALTH | Age: 63
End: 2023-10-04
Payer: COMMERCIAL

## 2023-10-04 VITALS
SYSTOLIC BLOOD PRESSURE: 122 MMHG | HEART RATE: 72 BPM | RESPIRATION RATE: 18 BRPM | DIASTOLIC BLOOD PRESSURE: 72 MMHG | OXYGEN SATURATION: 98 % | TEMPERATURE: 97.2 F

## 2023-10-04 PROCEDURE — G0151 HHCP-SERV OF PT,EA 15 MIN: HCPCS

## 2023-10-04 ASSESSMENT — ENCOUNTER SYMPTOMS: PAIN LOCATION - PAIN QUALITY: SORE

## 2023-10-11 ENCOUNTER — HOSPITAL ENCOUNTER (OUTPATIENT)
Dept: PHYSICAL THERAPY | Age: 63
Setting detail: RECURRING SERIES
Discharge: HOME | End: 2023-10-14
Attending: ORTHOPAEDIC SURGERY
Payer: COMMERCIAL

## 2023-10-11 DIAGNOSIS — M62.81 MUSCLE WEAKNESS (GENERALIZED): ICD-10-CM

## 2023-10-11 DIAGNOSIS — R26.89 OTHER ABNORMALITIES OF GAIT AND MOBILITY: ICD-10-CM

## 2023-10-11 DIAGNOSIS — M79.604 PAIN IN RIGHT LEG: Primary | ICD-10-CM

## 2023-10-11 PROCEDURE — 97161 PT EVAL LOW COMPLEX 20 MIN: CPT

## 2023-10-11 PROCEDURE — 97116 GAIT TRAINING THERAPY: CPT

## 2023-10-11 PROCEDURE — 97530 THERAPEUTIC ACTIVITIES: CPT

## 2023-10-11 NOTE — THERAPY EVALUATION
Strengthening; ROM; Balance training; Functional mobility training; Transfer training; Endurance training; Gait training; Stair training; Neuromuscular re-education; Manual; Home exercise program; Modalities; Therapeutic activities       GOALS: (Goals have been discussed and agreed upon with patient.)  Short-Term Functional Goals: Time Frame: 30 days  Patient will demonstrate understanding of a home exercise program independently. Patient will demonstrate > 95 degrees of right knee flexion to improve gait mechanics and functional mobility. Patient will increase his score on the PT questionnaires: LEFS to > 65/80 points from 52/80 initial score to demonstrate gains in function. Patient will be able to ambulate at least 150 ft within clinic without AD for progression within physical therapy and return to PLOF. Short-Term Functional Goals: Time Frame: 60 days  Patient will improve TUG score to < 12 seconds for improvement in functional mobility as he returns to PLOF and RLE pain improves. Patient will improve 5xSTS score to < 15 seconds for improvement in functional LE strength in relationship to RLE as he progresses to PLOF. Discharge Goals: Time Frame: 90 days    Patient will demonstrate > 115 degrees of right knee flexion to improve gait mechanics and functional mobility. Patient will increase his score on the PT questionnaires: LEFS to > 75/80 points from 52/80 initial score to demonstrate gains in function. Patient will navigate up/down 12 stairs with reciprocal gait pattern using no AD to demonstrate improved mobility. Patient will improve MMT BLE to >4/5 to improve current level of independence and community reintegration. Patient will improve R SLS to > 30 seconds to demonstrate gains in static balance for improvement in functional balance and reduced fall risk. Outcome Measure:    Tool Used: Five Times Sit to Stand (FTSST or 5xSST)   Score:  Initial: 37.72 seconds  Standard chair,

## 2023-10-11 NOTE — PROGRESS NOTES
Claudia Clarke  : 1960  Primary: [address] ([address])  Secondary:  [facility] [place] [address]  Phone: [phone]  Fax: [phone] Plan Frequency: 2x/week for 12 weeks    Plan of Care/Certification Expiration Date: 24            >PT Visit Info:  Plan Frequency: 2x/week for 12 weeks  Plan of Care/Certification Expiration Date: 24  Total # of Visits Approved: 40  Total # of Visits to Date: 1  Progress Note Counter: 1  Progress Note Due Date: 12/10/23 (or 10th visit)      Visit Count: 1    OUTPATIENT PHYSICAL THERAPY:  Treatment Note 10/11/2023       Episode (s/p right closed comminuted intra-articular fx of femur) Appt Desk             Treatment Diagnosis:    Visit Diagnoses         Codes    Pain in right leg    -  Primary M79.604    Muscle weakness (generalized)     M62.81    Other abnormalities of gait and mobility     R26.89            Medical/Referring Diagnosis:   Closed comminuted intra-articular fracture of distal femur, right, initial encounter [facility]) [H10.968Q]  Referring Physician: Carine Barber MD MD Orders: PT Eval and Treat  Date of Onset: Onset Date: 23       Allergies: Cat hair extract  Restrictions/Precautions:  Restrictions/Precautions: Other (comment) (WBAT)  Right Lower Extremity Weight Bearing: Weight Bearing As Tolerated  Left Lower Extremity Weight Bearing: Weight Bearing As Tolerated (For transfers only)       Interventions Planned (Treatment may consist of any combination of the following):    Current Treatment Recommendations: Strengthening; ROM; Balance training; Functional mobility training; Transfer training; Endurance training; Gait training; Stair training; Neuromuscular re-education; Manual; Home exercise program; Modalities;  Therapeutic activities       >Subjective Comments: See PT evaluation from today     >Initial:      (-2)/10       >Post Session:

## 2023-10-12 ENCOUNTER — HOSPITAL ENCOUNTER (OUTPATIENT)
Dept: PHYSICAL THERAPY | Age: 63
Setting detail: RECURRING SERIES
Discharge: HOME | End: 2023-10-15
Attending: ORTHOPAEDIC SURGERY
Payer: COMMERCIAL

## 2023-10-12 DIAGNOSIS — R26.89 OTHER ABNORMALITIES OF GAIT AND MOBILITY: ICD-10-CM

## 2023-10-12 DIAGNOSIS — M79.604 PAIN IN RIGHT LEG: Primary | ICD-10-CM

## 2023-10-12 DIAGNOSIS — M62.81 MUSCLE WEAKNESS (GENERALIZED): ICD-10-CM

## 2023-10-12 PROCEDURE — 97116 GAIT TRAINING THERAPY: CPT

## 2023-10-12 PROCEDURE — 97530 THERAPEUTIC ACTIVITIES: CPT

## 2023-10-12 ASSESSMENT — PAIN SCALES - GENERAL: PAINLEVEL_OUTOF10: 0

## 2023-10-12 NOTE — PROGRESS NOTES
Vladislav Lee  : 1960  Primary: [address] ([address])  Secondary:   [place] [address]  Phone: [phone]  Fax: [phone] Plan Frequency: 2x/week for 12 weeks    Plan of Care/Certification Expiration Date: 24            >PT Visit Info:  Plan Frequency: 2x/week for 12 weeks  Plan of Care/Certification Expiration Date: 24  Total # of Visits Approved: 40  Total # of Visits to Date: 2  Progress Note Counter: 2  Progress Note Due Date: 12/10/23 (or 10th visit)      Visit Count: 2    OUTPATIENT PHYSICAL THERAPY:  Treatment Note 10/12/2023       Episode (s/p right closed comminuted intra-articular fx of femur) Appt Desk             Treatment Diagnosis:    Visit Diagnoses         Codes    Pain in right leg    -  Primary M79.604    Muscle weakness (generalized)     M62.81    Other abnormalities of gait and mobility     R26.89          Medical/Referring Diagnosis:   Closed comminuted intra-articular fracture of distal femur, right, initial encounter [facility]) [N09.145Z]  Referring Physician: Wandy Kearney MD MD Orders: PT Eval and Treat  Date of Onset: Onset Date: 23       Allergies: Cat hair extract  Restrictions/Precautions:  Restrictions/Precautions: Other (comment) (WBAT)  Right Lower Extremity Weight Bearing: Weight Bearing As Tolerated  Left Lower Extremity Weight Bearing: Weight Bearing As Tolerated (For transfers only)       Interventions Planned (Treatment may consist of any combination of the following):    Current Treatment Recommendations: Strengthening; ROM; Balance training; Functional mobility training; Transfer training; Endurance training; Gait training; Stair training; Neuromuscular re-education; Manual; Home exercise program; Modalities; Therapeutic activities       >Subjective Comments: noting no discomfort from yesterdays evaluation.  he does have a gym within his complex and has access to

## 2023-10-19 ENCOUNTER — OFFICE VISIT (OUTPATIENT)
Dept: SLEEP MEDICINE | Age: 63
End: 2023-10-19
Payer: COMMERCIAL

## 2023-10-19 ENCOUNTER — HOSPITAL ENCOUNTER (OUTPATIENT)
Dept: PHYSICAL THERAPY | Age: 63
Setting detail: RECURRING SERIES
Discharge: HOME | End: 2023-10-22
Attending: ORTHOPAEDIC SURGERY
Payer: COMMERCIAL

## 2023-10-19 VITALS
RESPIRATION RATE: 13 BRPM | SYSTOLIC BLOOD PRESSURE: 128 MMHG | BODY MASS INDEX: 25.01 KG/M2 | WEIGHT: 165 LBS | DIASTOLIC BLOOD PRESSURE: 72 MMHG | HEIGHT: 68 IN | HEART RATE: 71 BPM | OXYGEN SATURATION: 96 %

## 2023-10-19 DIAGNOSIS — G47.34 NOCTURNAL HYPOXEMIA: ICD-10-CM

## 2023-10-19 DIAGNOSIS — G47.33 OSA (OBSTRUCTIVE SLEEP APNEA): Primary | ICD-10-CM

## 2023-10-19 DIAGNOSIS — G25.81 RLS (RESTLESS LEGS SYNDROME): ICD-10-CM

## 2023-10-19 DIAGNOSIS — M79.604 PAIN IN RIGHT LEG: Primary | ICD-10-CM

## 2023-10-19 DIAGNOSIS — M62.81 MUSCLE WEAKNESS (GENERALIZED): ICD-10-CM

## 2023-10-19 DIAGNOSIS — G47.52 REM BEHAVIORAL DISORDER: ICD-10-CM

## 2023-10-19 DIAGNOSIS — R26.89 OTHER ABNORMALITIES OF GAIT AND MOBILITY: ICD-10-CM

## 2023-10-19 PROCEDURE — 97530 THERAPEUTIC ACTIVITIES: CPT

## 2023-10-19 PROCEDURE — 99213 OFFICE O/P EST LOW 20 MIN: CPT | Performed by: STUDENT IN AN ORGANIZED HEALTH CARE EDUCATION/TRAINING PROGRAM

## 2023-10-19 RX ORDER — CLONAZEPAM 0.5 MG/1
0.5 TABLET ORAL NIGHTLY
Qty: 30 TABLET | Refills: 3 | Status: SHIPPED | OUTPATIENT
Start: 2023-10-19 | End: 2024-02-16

## 2023-10-19 ASSESSMENT — SLEEP AND FATIGUE QUESTIONNAIRES
HOW LIKELY ARE YOU TO NOD OFF OR FALL ASLEEP WHILE SITTING AND TALKING TO SOMEONE: 0
HOW LIKELY ARE YOU TO NOD OFF OR FALL ASLEEP WHILE SITTING QUIETLY AFTER LUNCH WITHOUT ALCOHOL: 1
HOW LIKELY ARE YOU TO NOD OFF OR FALL ASLEEP WHEN YOU ARE A PASSENGER IN A CAR FOR AN HOUR WITHOUT A BREAK: 1
HOW LIKELY ARE YOU TO NOD OFF OR FALL ASLEEP WHILE WATCHING TV: 1
HOW LIKELY ARE YOU TO NOD OFF OR FALL ASLEEP IN A CAR, WHILE STOPPED FOR A FEW MINUTES IN TRAFFIC: 0
HOW LIKELY ARE YOU TO NOD OFF OR FALL ASLEEP WHILE SITTING AND READING: 2
HOW LIKELY ARE YOU TO NOD OFF OR FALL ASLEEP WHILE LYING DOWN TO REST IN THE AFTERNOON WHEN CIRCUMSTANCES PERMIT: 3
ESS TOTAL SCORE: 9
HOW LIKELY ARE YOU TO NOD OFF OR FALL ASLEEP WHILE SITTING INACTIVE IN A PUBLIC PLACE: 1

## 2023-10-19 ASSESSMENT — PAIN SCALES - GENERAL: PAINLEVEL_OUTOF10: 0

## 2023-10-19 NOTE — PROGRESS NOTES
Claudine Gay  : 1960  Primary: [address] ([address])  Secondary:  [facility] [place] [address]  Phone: [phone]  Fax: [phone] Plan Frequency: 2x/week for 12 weeks    Plan of Care/Certification Expiration Date: 24            >PT Visit Info:  Plan Frequency: 2x/week for 12 weeks  Plan of Care/Certification Expiration Date: 24  Total # of Visits Approved: 40  Total # of Visits to Date: 3  Progress Note Counter: 3  Progress Note Due Date: 12/10/23 (or 10th visit)      Visit Count: 3    OUTPATIENT PHYSICAL THERAPY:  Treatment Note 10/19/2023       Episode (s/p right closed comminuted intra-articular fx of femur) Appt Desk             Treatment Diagnosis:    Visit Diagnoses         Codes    Pain in right leg    -  Primary M79.604    Muscle weakness (generalized)     M62.81    Other abnormalities of gait and mobility     R26.89          Medical/Referring Diagnosis:   Closed comminuted intra-articular fracture of distal femur, right, initial encounter [facility]) [Y54.165I]  Referring Physician: Juan North MD MD Orders: PT Eval and Treat  Date of Onset: Onset Date: 23       Allergies: Cat hair extract  Restrictions/Precautions:  Restrictions/Precautions: Other (comment) (WBAT)  Right Lower Extremity Weight Bearing: Weight Bearing As Tolerated  Left Lower Extremity Weight Bearing: Weight Bearing As Tolerated (For transfers only)       Interventions Planned (Treatment may consist of any combination of the following):    Current Treatment Recommendations: Strengthening; ROM; Balance training; Functional mobility training; Transfer training; Endurance training; Gait training; Stair training; Neuromuscular re-education; Manual; Home exercise program; Modalities; Therapeutic activities       >Subjective Comments: went out to [place] over the weekend.  did not get a chance to use the stepper at his

## 2023-10-20 ENCOUNTER — HOSPITAL ENCOUNTER (OUTPATIENT)
Dept: PHYSICAL THERAPY | Age: 63
Setting detail: RECURRING SERIES
Discharge: HOME | End: 2023-10-23
Attending: ORTHOPAEDIC SURGERY
Payer: COMMERCIAL

## 2023-10-20 PROCEDURE — 97110 THERAPEUTIC EXERCISES: CPT

## 2023-10-20 ASSESSMENT — PAIN SCALES - GENERAL: PAINLEVEL_OUTOF10: 0

## 2023-10-20 NOTE — PROGRESS NOTES
hip in neutral    LAQ >>> 2x20 R X 20 R   Clamshells >>> >>> X 12 B- cues to not roll    Hip Ext Prone: 2x10 B Prone: 2x10 B -  With towel roll under L pelvis to dec pelvic rot during RLE lift    HSC Prone: minimal range achieved  Hip IR - 2x10 R  Hip ER - 2x10 R Prone: minimal range achieved  Hip IR - 1x20 R  Hip ER - 1x20 R    Heel Slide with knee flexor stretch 1x10 10\"H R  Seated - with patient scooting forward and backward    R Knee flexion stretch at step 2x30\"H in // bars     Hip hiking    L foot on 2 inch step - hiking R hip up  x 12 - some cueing for technique    Hip Abd >>> Std: 1x15 B, fingertips   No trendelenburg sign noted Standing   X 15 B - needing a tad more support when on RLE   L Fwd Stepping with return to neutral stance 1x10 with RUE at // bar  1x10 no UE     R Bwd Stepping with return to neutral stance 1x10 with RUE at // bar  1x10 no UE     Step through's with LLE, R hand at // bar 1x15 1x15 ea way, LUE at // bar X 15 each way with L UE    STS from elevated mat table Cues to shift weight into RLE for equal WB into LEs     Fwd Step Ups  2\" 1x10 B, LUE  4\" 1x10 B, LUE 4\" x 15 B, L UE    Step Downs  2\" 1x15 B, (no UE L, LUE RLE stance)    Lat Step Ups  2\" 2x10 B, LUE 4\"  x 10 B - L UE    Hamstring stretch  >>>    Incline board   3 x 20 sec hold with knees extended      HEP   [employer] Access Code: [address]  Exercises: 10/12: LLE step throughs, STS with neutral stance, SLR, knee flexion stretch with strap in long sitting and with step, use of stepper daily for 10 minutes if he can manage (will cont to use in PT to look at his progress)    Treatment/Session Summary:    >Treatment Assessment: Added clams and seated stretch to HEP. Added hip hiking for her. No trouble with any of the session today. States he has 2 visits here, 1 dental, and 1 orhto - All next week. Communication/Consultation: PT encouraged patient to performing HEP consistently.   Equipment provided: None  Recommendations/Intent

## 2023-10-23 ENCOUNTER — HOSPITAL ENCOUNTER (OUTPATIENT)
Dept: PHYSICAL THERAPY | Age: 63
Setting detail: RECURRING SERIES
Discharge: HOME | End: 2023-10-26
Attending: ORTHOPAEDIC SURGERY
Payer: COMMERCIAL

## 2023-10-23 DIAGNOSIS — M62.81 MUSCLE WEAKNESS (GENERALIZED): ICD-10-CM

## 2023-10-23 DIAGNOSIS — R26.89 OTHER ABNORMALITIES OF GAIT AND MOBILITY: ICD-10-CM

## 2023-10-23 DIAGNOSIS — M79.604 PAIN IN RIGHT LEG: Primary | ICD-10-CM

## 2023-10-23 PROCEDURE — 97530 THERAPEUTIC ACTIVITIES: CPT

## 2023-10-23 ASSESSMENT — PAIN SCALES - GENERAL: PAINLEVEL_OUTOF10: 0

## 2023-10-23 NOTE — PROGRESS NOTES
Noel [place]  : 1960  Primary: [address] (Com[address])  Secondary:  [facility] [place] [address]  Phone: [phone]  Fax: [phone] Plan Frequency: 2x/week for 12 weeks    Plan of Care/Certification Expiration Date: 24            >PT Visit Info:  Plan Frequency: 2x/week for 12 weeks  Plan of Care/Certification Expiration Date: 24  Total # of Visits Approved: 40  Total # of Visits to Date: 5  Progress Note Counter: 5  Progress Note Due Date: 12/10/23 (or 10th visit)      Visit Count: 5    OUTPATIENT PHYSICAL THERAPY:  Treatment Note 10/23/2023       Episode (s/p right closed comminuted intra-articular fx of femur) Appt Desk             Treatment Diagnosis:    Visit Diagnoses         Codes    Pain in right leg    -  Primary M79.604    Muscle weakness (generalized)     M62.81    Other abnormalities of gait and mobility     R26.89          Medical/Referring Diagnosis:   Closed comminuted intra-articular fracture of distal femur, right, initial encounter [facility]) [U90.542Y]  Referring Physician: Jazmin Mancilla MD MD Orders: PT Eval and Treat  Date of Onset: Onset Date: 23       Allergies: Cat hair extract  Restrictions/Precautions:  Restrictions/Precautions: Other (comment) (WBAT)  Right Lower Extremity Weight Bearing: Weight Bearing As Tolerated  Left Lower Extremity Weight Bearing: Weight Bearing As Tolerated (For transfers only)       Interventions Planned (Treatment may consist of any combination of the following):    Current Treatment Recommendations: Strengthening; ROM; Balance training; Functional mobility training; Transfer training; Endurance training; Gait training; Stair training; Neuromuscular re-education; Manual; Home exercise program; Modalities;  Therapeutic activities       >Subjective Comments: sees his MD tomorrow for follow up appt     >Initial:     0/10       >Post Session:

## 2023-10-24 ENCOUNTER — OFFICE VISIT (OUTPATIENT)
Dept: ORTHOPEDIC SURGERY | Age: 63
End: 2023-10-24

## 2023-10-24 DIAGNOSIS — S72.491A CLOSED COMMINUTED INTRA-ARTICULAR FRACTURE OF DISTAL FEMUR, RIGHT, INITIAL ENCOUNTER (HCC): Primary | ICD-10-CM

## 2023-10-24 DIAGNOSIS — S92.355D CLOSED NONDISPLACED FRACTURE OF FIFTH METATARSAL BONE OF LEFT FOOT WITH ROUTINE HEALING, SUBSEQUENT ENCOUNTER: ICD-10-CM

## 2023-10-24 NOTE — PROGRESS NOTES
fracture    Khzergtwk-[id number]-mw left base of the fifth metatarsal fracture, findings-3 views of the left foot shows a left fifth metatarsal avulsion fracture shows significant evidence of healing in reasonable alignment on all 3 views. Impression-healing left base of the fifth metatarsal avulsion fracture    Assessment:     Healing right distal femur and left fifth metatarsal avulsion fractures, prominent hardware right knee    Plan:     I have spoken with him a little bit about the screw that is over the lateral aspect of his right knee. This is something that I think that bothers him then we could easily take it out. I am encouraged with the way his x-rays look so I think his fracture is healing and healing in excellent position so I think that the fact that the screw is backing out really is more of a nuisance to him and really at any point we could remove this and I think it would make him much more comfortable. He seems to think does not really bother him too much he like to sort of just wait on this which I do not think is unreasonable. I have told him that I would continue with full active and passive range of motion of his right knee and entire right lower extremity and also active and passive range of motion of his left ankle. He can be full strengthening. He can be weightbearing as tolerated. In short he has no restrictions. He says that therapy is helping with his knee quite a bit. So I think he can continue doing range of motion exercises to help get most of his motion back. I will see him back for 1 final visit in 6 weeks with AP and lateral right knee and distal femur on return and if he would like for me to remove that screw either at that time or even in the interim I will be happy to do so.   Be about 4-1/2 months out at that time and I do not think we need x-rays of his left foot    Signed By: John Hanks MD     October 24, 2023

## 2023-10-25 ENCOUNTER — PATIENT MESSAGE (OUTPATIENT)
Dept: ORTHOPEDIC SURGERY | Age: 63
End: 2023-10-25

## 2023-10-26 NOTE — TELEPHONE ENCOUNTER
Phone call made to pt but had to LM on VM to return call to office to discuss MyChart message.  [address]

## 2023-10-26 NOTE — TELEPHONE ENCOUNTER
----- Message from Cipriano Walker sent at 10/25/2023  9:13 AM EDT -----  Regarding: The Screw/bolt that backed out  Contact: [phone]  1. Could the screw interfere with bending of knee? 2.  Laying on sides. Usually if any pain it's on the right side of knee. Could the screw be reason? Pain is when I turn to sides, than turn on back. Next appointment (Dec 5), if pain continues than removal maybe consider?       Parts notes:  (The rayne is intact and the only problem with his hardware is at his most distal lateral to medial interlock bolt has backed out somewhat)

## 2023-10-27 ENCOUNTER — TELEPHONE (OUTPATIENT)
Dept: ORTHOPEDIC SURGERY | Age: 63
End: 2023-10-27

## 2023-10-27 ENCOUNTER — HOSPITAL ENCOUNTER (OUTPATIENT)
Dept: PHYSICAL THERAPY | Age: 63
Setting detail: RECURRING SERIES
Discharge: HOME | End: 2023-10-30
Attending: ORTHOPAEDIC SURGERY
Payer: COMMERCIAL

## 2023-10-27 DIAGNOSIS — R26.89 OTHER ABNORMALITIES OF GAIT AND MOBILITY: ICD-10-CM

## 2023-10-27 DIAGNOSIS — M62.81 MUSCLE WEAKNESS (GENERALIZED): ICD-10-CM

## 2023-10-27 DIAGNOSIS — M79.604 PAIN IN RIGHT LEG: Primary | ICD-10-CM

## 2023-10-27 PROCEDURE — 97530 THERAPEUTIC ACTIVITIES: CPT

## 2023-10-27 NOTE — TELEPHONE ENCOUNTER
Returned call again to pt but had to LM on VM. Will send a [employer] message.  [facility]

## 2023-10-27 NOTE — PROGRESS NOTES
Noting that the screw can be left in if he wants or can come out. His follow up appt is 12/5/23.     >Initial:      0/10       >Post Session:        1/10     Medications Last Reviewed: 10/27/2023    Updated Objective Findings: N/A    Treatment     GAIT TRAINING: (0 minutes): Gait training to improve and/or restore physical functioning as related to mobility, strength, balance and coordination. Ambulated with minimal visual, verbal, and tactile cueing related to stance phase, stride length, push off and heel strike. THERAPEUTIC EXERCISE: (0 minutes): Exercises per grid below to improve mobility, strength, and balance. Required minimal visual, verbal and tactile cues to promote proper body alignment, posture and body mechanics. Progressed resistance, range and repetitions as indicated. THERAPEUTIC ACTIVITY: (40 minutes): Therapeutic activities per grid below to improve mobility, strength and balance. Required minimal visual, verbal and tactile cues to promote static and dynamic balance in sitting/standing, as well as coordination of bilateral extremities. NEUROMUSCULAR RE-EDUCATION: (0 minutes): Exercise/activities per grid below to improve balance, coordination, kinesthetic sense, posture and proprioception. Required minimal visual, verbal and tactile cues to promote static and dynamic balance in sitting/standing, as well as coordination of bilateral extremities. MANUAL THERAPY: (0 minutes): Joint mobilization, soft tissue mobilization and manipulation was utilized and necessary because of the patient's restricted joint motion, painful spasm, loss of articular motion and restricted motion of soft tissue.      MODALITIES: (0 minutes): Vasopneumatic compression at 34 degrees     Treatment Area: RLE weakness/pain Date:   10/19/23 Date:  10/20/23  Date:  10/23/23 Date:  10/27/23   Activity/Exercise       Patient asessment & education Discussed cont to ice knee to red swelling      Recumbent Stepper to

## 2023-10-27 NOTE — TELEPHONE ENCOUNTER
----- Message from Enriqueta Jovany sent at 10/25/2023  9:13 AM EDT -----  Regarding: The Screw/bolt that backed out  Contact: [phone]  1. Could the screw interfere with bending of knee? 2.  Laying on sides. Usually if any pain it's on the right side of knee. Could the screw be reason? Pain is when I turn to sides, than turn on back. Next appointment (Dec 5), if pain continues than removal maybe consider?       Parts notes:  (The rayne is intact and the only problem with his hardware is at his most distal lateral to medial interlock bolt has backed out somewhat) Quality 130: Documentation Of Current Medications In The Medical Record: Current Medications Documented Quality 110: Preventive Care And Screening: Influenza Immunization: Influenza Immunization previously received during influenza season Detail Level: Generalized

## 2023-10-28 DIAGNOSIS — D50.0 BLOOD LOSS ANEMIA: ICD-10-CM

## 2023-10-30 ENCOUNTER — HOSPITAL ENCOUNTER (OUTPATIENT)
Dept: PHYSICAL THERAPY | Age: 63
Setting detail: RECURRING SERIES
Discharge: HOME | End: 2023-11-02
Attending: ORTHOPAEDIC SURGERY
Payer: COMMERCIAL

## 2023-10-30 DIAGNOSIS — M79.604 PAIN IN RIGHT LEG: Primary | ICD-10-CM

## 2023-10-30 DIAGNOSIS — R26.89 OTHER ABNORMALITIES OF GAIT AND MOBILITY: ICD-10-CM

## 2023-10-30 DIAGNOSIS — M62.81 MUSCLE WEAKNESS (GENERALIZED): ICD-10-CM

## 2023-10-30 PROCEDURE — 97530 THERAPEUTIC ACTIVITIES: CPT

## 2023-10-30 PROCEDURE — 97112 NEUROMUSCULAR REEDUCATION: CPT

## 2023-10-30 RX ORDER — FERROUS SULFATE 325(65) MG
325 TABLET ORAL 2 TIMES DAILY WITH MEALS
Qty: 60 TABLET | Refills: 2 | Status: SHIPPED | OUTPATIENT
Start: 2023-10-30 | End: 2024-01-28

## 2023-10-30 ASSESSMENT — PAIN SCALES - GENERAL: PAINLEVEL_OUTOF10: 1

## 2023-10-31 ENCOUNTER — OFFICE VISIT (OUTPATIENT)
Dept: ORTHOPEDIC SURGERY | Age: 63
End: 2023-10-31

## 2023-10-31 DIAGNOSIS — S72.491A CLOSED COMMINUTED INTRA-ARTICULAR FRACTURE OF DISTAL FEMUR, RIGHT, INITIAL ENCOUNTER (HCC): Primary | ICD-10-CM

## 2023-10-31 NOTE — PROGRESS NOTES
Progress Note    Patient: Deon Mascorro MRN: [id number]  SSN: [id number]    YOB: 1960  Age: 58 y.o. Sex: male        10/31/2023      Subjective:     Patient is here today again in follow-up. I saw him last week. He is about 3 months out and his fracture was doing really well but one of his distal interlock bolts the back that was really bother him. He is back here today because he wants to talk little bit about getting this out it really is starting to aggravate him quite a bit. He thinks it is limiting his range of motion and it just hurts a lot    Objective: There were no vitals filed for this visit. Physical Exam:     Skin - incision is well healed with no redness or drainage  Motor and sensory function intact in RIGHT LOWER extremity  Pulses palpable in RIGHT LOWER extremity   He does have significant tenderness to palpation over the screw itself  XRAY FINDINGS:  Rbipfbvgru-xpfkez-bo right distal femur fracture, findings-true AP and lateral views of the right knee and distal femur shows that the fracture shows significant evidence of healing on both AP and lateral projection. Overall alignment is satisfactory but his most distal lateral medial interlock bolt is definitely backed out quite a bit. Is very stable in its appearance compared to his previous films. Impression #healing distal femur fracture with prominent screw    Assessment:     Painful hardware right knee    Plan:     Talked him a little bit about either trying to remove this in the office or in the operating room. I have gone through it I think at the advantages and disadvantages of each of these. After discussing this I have elected just to take it out in the office and it was a very simple. I prepped the skin and made a small incision just remove the screw very easily with a needle . He seems of tolerated this very well.   I think he can follow-up in 3 weeks with no new x-rays at that time just for

## 2023-11-03 ENCOUNTER — HOSPITAL ENCOUNTER (OUTPATIENT)
Dept: PHYSICAL THERAPY | Age: 63
Setting detail: RECURRING SERIES
Discharge: HOME | End: 2023-11-06
Attending: ORTHOPAEDIC SURGERY
Payer: COMMERCIAL

## 2023-11-03 DIAGNOSIS — R26.89 OTHER ABNORMALITIES OF GAIT AND MOBILITY: ICD-10-CM

## 2023-11-03 DIAGNOSIS — M62.81 MUSCLE WEAKNESS (GENERALIZED): ICD-10-CM

## 2023-11-03 DIAGNOSIS — M79.604 PAIN IN RIGHT LEG: Primary | ICD-10-CM

## 2023-11-03 PROCEDURE — 97530 THERAPEUTIC ACTIVITIES: CPT

## 2023-11-03 PROCEDURE — 97116 GAIT TRAINING THERAPY: CPT

## 2023-11-03 NOTE — PROGRESS NOTES
Claudia Clarke  : 1960  Primary: [address] ([address])  Secondary:  [facility] [place] [address]  Phone: [phone]  Fax: [phone] Plan Frequency: 2x/week for 12 weeks    Plan of Care/Certification Expiration Date: 24            PT Visit Info:  Plan Frequency: 2x/week for 12 weeks  Plan of Care/Certification Expiration Date: 24  Total # of Visits Approved: 40  Total # of Visits to Date: 8  Progress Note Counter: 0  Progress Note Due Date: 12/10/23 (or 10th visit)      Visit Count: 8                OUTPATIENT PHYSICAL THERAPY:  Progress Report 11/3/2023   Episode (s/p right closed comminuted intra-articular fx of femur)   Appt Desk         Treatment Diagnosis:    Visit Diagnoses         Codes    Pain in right leg    -  Primary M79.604    Muscle weakness (generalized)     M62.81    Other abnormalities of gait and mobility     R26.89          Medical/Referring Diagnosis:   Closed comminuted intra-articular fracture of distal femur, right, initial encounter [facility]) [O27.919F]  Surgeon: Carine Barber  Referring Provider: Carine Barber MD MD Orders: PT Eval and Treat   Return MD Appt: TBD    Date of Onset: Onset Date: 23       Allergies: Cat hair extract  Restrictions/Precautions:    Restrictions/Precautions: Other (comment) (WBAT)         Medications Last Reviewed:  11/3/2023       SUBJECTIVE     History of Injury/Illness (Reason for Referral):  10/11/23: Claudia Clarke relates he had fallen when walking his dog on 23 when the animal pulled. This resulted in a right intercondylar supracondylar distal femur fracture. He had surgery that same day - retrograde intramedullary nail fixation of a rather complex distal femur fracture (per MD note). Per chart review: He also has a left base of the fifth metatarsal avulsion fracture that was treated nonoperatively.  Yenny Garcia arrives today without restrictions
with LLE, R hand at // bar 1x15 ea way, LUE at // bar X 15 each way with L UE        STS from elevated mat table         Fwd Step Ups 2\" 1x10 B, LUE  4\" 1x10 B, LUE 4\" x 15 B, L UE  4\" 2x10 B, L UE 4\" 3x10 B, L UE 6\" 2x10 R, LUE    Step Downs 2\" 1x15 B, (no UE L, LUE RLE stance)  4\" 2x10 B, 1 HHA - next time R SLS only, no longer hesitant with RLE when accepting weight into limb during this activity 4\" 2x15 R SLS only, 1 HHA 4\" 2x15 R SLS only, 1 HHA    6\" attempted but unable to tolerate greater knee flexion ROM    Lat Step Ups 2\" 2x10 B, LUE 4\"  x 10 B - L UE  4\" 2x10 B, L UE 4\" 3x10 R, L UE 6\" 2x10 R, LUE    Hamstring stretch >>>        Gastroc Stretch - Incline board  3x20 sec hold 2x30\"H        HEP   Medbridge Access Code: [email or web address]  Exercises: 10/12: LLE step throughs, STS with neutral stance, SLR, knee flexion stretch with strap in long sitting and with step, use of stepper daily for 10 minutes if he can manage (will cont to use in PT to look at his progress)  10/20: clamshells s/l and seated stretch  10/27: seated HSC with RTB  10/30: std hip abd + ext RTB    Treatment/Session Summary:    >Treatment Assessment:  See PN from today. Ambulated within the clinic to work on improving gait quality and RLE stiffness / foot clearance. Does well with cueing, recommended that he cont to utilize the cane for now but with plans to progress away from this in the upcoming weeks. Communication/Consultation: PT encouraged patient to performing HEP consistently. Equipment provided: RTB  Recommendations/Intent for next treatment session: Next visit will focus on advancements to more challenging activities. Progress repetitions, weight, and complexity of functional movement per pt tolerance and as indicated.     >Total Treatment Billable Duration: 45 minutes  Time In: 1130  Time Out: 1217    Enoc Cullen PT, DPT    Charge Capture  Post Session Pain  PT Visit Info  [employer] Portal  MD Guidelines  Scanned Media  Benefits

## 2023-11-07 ENCOUNTER — HOSPITAL ENCOUNTER (OUTPATIENT)
Dept: PHYSICAL THERAPY | Age: 63
Setting detail: RECURRING SERIES
Discharge: HOME | End: 2023-11-10
Attending: ORTHOPAEDIC SURGERY
Payer: COMMERCIAL

## 2023-11-07 DIAGNOSIS — M62.81 MUSCLE WEAKNESS (GENERALIZED): ICD-10-CM

## 2023-11-07 DIAGNOSIS — R26.89 OTHER ABNORMALITIES OF GAIT AND MOBILITY: ICD-10-CM

## 2023-11-07 DIAGNOSIS — M79.604 PAIN IN RIGHT LEG: Primary | ICD-10-CM

## 2023-11-07 PROCEDURE — 97530 THERAPEUTIC ACTIVITIES: CPT

## 2023-11-07 NOTE — PROGRESS NOTES
Kinga Wade  : 1960  Primary: [address] ([address])  Secondary:   [place] [place] [address]  Phone: [phone]  Fax: [phone] Plan Frequency: 2x/week for 12 weeks    Plan of Care/Certification Expiration Date: 24            >PT Visit Info:  Plan Frequency: 2x/week for 12 weeks  Plan of Care/Certification Expiration Date: 24  Total # of Visits Approved: 40  Total # of Visits to Date: 9  Progress Note Counter: 1  Progress Note Due Date: 12/10/23 (or 10th visit)     Visit Count: 9    OUTPATIENT PHYSICAL THERAPY:  Treatment Note 2023       Episode (s/p right closed comminuted intra-articular fx of femur) Appt Desk             Treatment Diagnosis:    Visit Diagnoses         Codes    Pain in right leg    -  Primary M79.604    Muscle weakness (generalized)     M62.81    Other abnormalities of gait and mobility     R26.89          Medical/Referring Diagnosis:   Closed comminuted intra-articular fracture of distal femur, right, initial encounter [facility]) [K65.891R]  Referring Physician: Danielle Adorno MD MD Orders: PT Eval and Treat  Date of Onset: Onset Date: 23       Allergies: Cat hair extract  Restrictions/Precautions:  Restrictions/Precautions: Other (comment) (WBAT)  Right Lower Extremity Weight Bearing: Weight Bearing As Tolerated  Left Lower Extremity Weight Bearing: Weight Bearing As Tolerated (For transfers only)       Interventions Planned (Treatment may consist of any combination of the following):    Current Treatment Recommendations: Strengthening; ROM; Balance training; Functional mobility training; Transfer training; Endurance training; Gait training; Stair training; Neuromuscular re-education; Manual; Home exercise program; Modalities;  Therapeutic activities       >Subjective Comments:  cont to have no pain due since screw removal    His follow up appt is 23.     >Initial:      0/10

## 2023-11-07 NOTE — PROGRESS NOTES
I am accessing Mr. Emilee Vaughn chart as a part of our department's internal chart auditing process. I certify that Mr. Josephus Kehr is, or was, a patient in our department.   Thank you,  Carlota Arguelles, PT  11/7/2023

## 2023-11-09 ENCOUNTER — HOSPITAL ENCOUNTER (OUTPATIENT)
Dept: PHYSICAL THERAPY | Age: 63
Setting detail: RECURRING SERIES
Discharge: HOME | End: 2023-11-12
Attending: ORTHOPAEDIC SURGERY
Payer: COMMERCIAL

## 2023-11-09 DIAGNOSIS — R26.89 OTHER ABNORMALITIES OF GAIT AND MOBILITY: ICD-10-CM

## 2023-11-09 DIAGNOSIS — M79.604 PAIN IN RIGHT LEG: Primary | ICD-10-CM

## 2023-11-09 DIAGNOSIS — M62.81 MUSCLE WEAKNESS (GENERALIZED): ICD-10-CM

## 2023-11-09 PROCEDURE — 97116 GAIT TRAINING THERAPY: CPT

## 2023-11-09 PROCEDURE — 97530 THERAPEUTIC ACTIVITIES: CPT

## 2023-11-09 NOTE — PROGRESS NOTES
Claudine Gay  : 1960  Primary: [address] ([address])  Secondary:  [facility] [place] [address] [place] [address]  Phone: [phone]  Fax: [phone] Plan Frequency: 2x/week for 12 weeks    Plan of Care/Certification Expiration Date: 24            >PT Visit Info:  Plan Frequency: 2x/week for 12 weeks  Plan of Care/Certification Expiration Date: 24  Total # of Visits Approved: 40  Total # of Visits to Date: 10  Progress Note Counter: 2  Progress Note Due Date: 12/10/23 (or 10th visit)     Visit Count: 10    OUTPATIENT PHYSICAL THERAPY:  Treatment Note 2023       Episode (s/p right closed comminuted intra-articular fx of femur) Appt Desk             Treatment Diagnosis:    Visit Diagnoses         Codes    Pain in right leg    -  Primary M79.604    Muscle weakness (generalized)     M62.81    Other abnormalities of gait and mobility     R26.89          Medical/Referring Diagnosis:   Closed comminuted intra-articular fracture of distal femur, right, initial encounter [facility]) [Q71.011D]  Referring Physician: Juan North MD MD Orders: PT Eval and Treat  Date of Onset: Onset Date: 23       Allergies: Cat hair extract  Restrictions/Precautions:  Restrictions/Precautions: Other (comment) (WBAT)  Right Lower Extremity Weight Bearing: Weight Bearing As Tolerated  Left Lower Extremity Weight Bearing: Weight Bearing As Tolerated (For transfers only)       Interventions Planned (Treatment may consist of any combination of the following):    Current Treatment Recommendations: Strengthening; ROM; Balance training; Functional mobility training; Transfer training; Endurance training; Gait training; Stair training; Neuromuscular re-education; Manual; Home exercise program; Modalities; Therapeutic activities       >Subjective Comments:  he went sight seeing this past [facility], has not tried hiking yet.     His follow up appt

## 2023-11-14 ENCOUNTER — HOSPITAL ENCOUNTER (OUTPATIENT)
Dept: PHYSICAL THERAPY | Age: 63
Setting detail: RECURRING SERIES
Discharge: HOME | End: 2023-11-17
Attending: ORTHOPAEDIC SURGERY
Payer: COMMERCIAL

## 2023-11-14 DIAGNOSIS — R26.89 OTHER ABNORMALITIES OF GAIT AND MOBILITY: ICD-10-CM

## 2023-11-14 DIAGNOSIS — M62.81 MUSCLE WEAKNESS (GENERALIZED): ICD-10-CM

## 2023-11-14 DIAGNOSIS — M79.604 PAIN IN RIGHT LEG: Primary | ICD-10-CM

## 2023-11-14 PROCEDURE — 97530 THERAPEUTIC ACTIVITIES: CPT

## 2023-11-14 ASSESSMENT — PAIN SCALES - GENERAL: PAINLEVEL_OUTOF10: 0

## 2023-11-16 ENCOUNTER — HOSPITAL ENCOUNTER (OUTPATIENT)
Dept: PHYSICAL THERAPY | Age: 63
Setting detail: RECURRING SERIES
Discharge: HOME | End: 2023-11-19
Attending: ORTHOPAEDIC SURGERY
Payer: COMMERCIAL

## 2023-11-16 DIAGNOSIS — M79.604 PAIN IN RIGHT LEG: Primary | ICD-10-CM

## 2023-11-16 DIAGNOSIS — M62.81 MUSCLE WEAKNESS (GENERALIZED): ICD-10-CM

## 2023-11-16 DIAGNOSIS — R26.89 OTHER ABNORMALITIES OF GAIT AND MOBILITY: ICD-10-CM

## 2023-11-16 PROCEDURE — 97110 THERAPEUTIC EXERCISES: CPT

## 2023-11-16 PROCEDURE — 97530 THERAPEUTIC ACTIVITIES: CPT

## 2023-11-16 ASSESSMENT — PAIN SCALES - GENERAL: PAINLEVEL_OUTOF10: 0

## 2023-11-16 NOTE — PROGRESS NOTES
endurance gains Level 3  6 minutes  Seat: 12  Arms: 3 Physiostep to address R knee mobility:  Level 1  6 minutes  Seat 7  Full revolutions Physiostep to address R knee mobility:  Level 1  6 minutes  Seat 6  Full revolutions Physiostep to address R knee mobility:  Level 1  6 minutes  Seat 6  Full revolutions   Bridge    Figure 4: 3x10 B   SLR    2x15 R   Modified Supine Leg Extensions    3x10 R   LAQ   Next visit >>>> Band around ankles: 1x20 R, GTB   Mini fwd lunges in place 2x12 ea LE 3x10 ea LE    Walking forward lunges: 2x30' with verbal cues first set - 1 UE at half wall Walking forward lunges: 1x60' at half wall    Walking backward lunges: 1x60' at half wall - initially alternating to neutral stance but then cont to perform with LLE post to neutral to emphasize R toe off explosive movement Walking forward lunges: 1x60' at half wall           Lat Lunges  2x10 ea LE, BUE at half wall initially then intermittently with 2nd set - cues for form 2x10 ea LE, initial set keeping feet planted and focusing more on weight shift then with foot movement 1x10 then 1x15 ea LE, initial set keeping feet planted and focusing more on weight shift then with foot movement   Clamshells       Hip Ext       HSC       Squat in // bar with B HHA to emphasize knee flexion 2x15 >>> 2x15 2x15   R Knee flexion stretch at step       Hip hiking        Hip Abd       L Fwd Stepping with return to neutral stance 3 way skaters: R SLS 2x10 fwd, lat, bwd with B HHA - emphasis on hip hinge and keeping full weight into RLE throughout 3 way skaters: R SLS 2x10 fwd, lat, bwd with B HHA - emphasis on hip hinge and keeping full weight into RLE throughout     R Bwd Stepping with return to neutral stance       Step through's with LLE, R hand at // bar       STS from elevated mat table       Fwd Step Ups 8\" 1x20 R, LUE   8\" 1x30 R, LUE - kept RLE on step today to further target the right quads   Step Downs 6\" step down well tolerated now - 1x20 R foot

## 2023-11-21 ENCOUNTER — OFFICE VISIT (OUTPATIENT)
Dept: ORTHOPEDIC SURGERY | Age: 63
End: 2023-11-21

## 2023-11-21 ENCOUNTER — HOSPITAL ENCOUNTER (OUTPATIENT)
Dept: PHYSICAL THERAPY | Age: 63
Setting detail: RECURRING SERIES
Discharge: HOME | End: 2023-11-24
Attending: ORTHOPAEDIC SURGERY
Payer: COMMERCIAL

## 2023-11-21 DIAGNOSIS — M62.81 MUSCLE WEAKNESS (GENERALIZED): ICD-10-CM

## 2023-11-21 DIAGNOSIS — R26.89 OTHER ABNORMALITIES OF GAIT AND MOBILITY: ICD-10-CM

## 2023-11-21 DIAGNOSIS — Z48.02 VISIT FOR SUTURE REMOVAL: ICD-10-CM

## 2023-11-21 DIAGNOSIS — S92.355D CLOSED NONDISPLACED FRACTURE OF FIFTH METATARSAL BONE OF LEFT FOOT WITH ROUTINE HEALING, SUBSEQUENT ENCOUNTER: Primary | ICD-10-CM

## 2023-11-21 DIAGNOSIS — M79.604 PAIN IN RIGHT LEG: Primary | ICD-10-CM

## 2023-11-21 PROCEDURE — 99024 POSTOP FOLLOW-UP VISIT: CPT | Performed by: ORTHOPAEDIC SURGERY

## 2023-11-21 PROCEDURE — 97110 THERAPEUTIC EXERCISES: CPT

## 2023-11-21 PROCEDURE — 97530 THERAPEUTIC ACTIVITIES: CPT

## 2023-11-21 ASSESSMENT — PAIN SCALES - GENERAL: PAINLEVEL_OUTOF10: 0

## 2023-11-21 NOTE — PROGRESS NOTES
Froilan Escalona  : 1960  Primary: [address] ([address])  [facility]  [facility] [place] [address]  Phone: [phone]  Fax: [phone] Plan Frequency: 2x/week for 12 weeks    Plan of Care/Certification Expiration Date: 24            >PT Visit Info:  Plan Frequency: 2x/week for 12 weeks  Plan of Care/Certification Expiration Date: 24  Total # of Visits Approved: 40  Total # of Visits to Date: 13  Progress Note Counter: 5  Progress Note Due Date: 12/10/23 (or 10th visit)     Visit Count: 13    OUTPATIENT PHYSICAL THERAPY:  Treatment Note 2023       Episode (s/p right closed comminuted intra-articular fx of femur) Appt Desk             Treatment Diagnosis:    Visit Diagnoses         Codes    Pain in right leg    -  Primary M79.604    Muscle weakness (generalized)     M62.81    Other abnormalities of gait and mobility     R26.89          Medical/Referring Diagnosis:   Closed comminuted intra-articular fracture of distal femur, right, initial encounter [facility]) [G82.118Z]  Referring Physician: Salvatore Hall MD MD Orders: PT Eval and Treat  Date of Onset: Onset Date: 23       Allergies: Cat hair extract  Restrictions/Precautions:  Restrictions/Precautions: Other (comment) (WBAT)  Right Lower Extremity Weight Bearing: Weight Bearing As Tolerated  Left Lower Extremity Weight Bearing: Weight Bearing As Tolerated (For transfers only)       Interventions Planned (Treatment may consist of any combination of the following):    Current Treatment Recommendations: Strengthening; ROM; Balance training; Functional mobility training; Transfer training; Endurance training; Gait training; Stair training; Neuromuscular re-education; Manual; Home exercise program; Modalities; Therapeutic activities       >Subjective Comments: had his stitch removed at the MD office visit today.       >Initial:     0/10       >Post Session:

## 2023-11-21 NOTE — PROGRESS NOTES
Progress Note    Patient: Genevieve Ventura MRN: [id number]  SSN: [id number]    YOB: 1960  Age: 58 y.o. Sex: male        11/21/2023      Subjective:     Patient is here today in follow-up after we removed a screw from his right knee. This was a interlock bolt that was bothering him in the lateral aspect of his right knee. Was so prominent we are able to get out pretty easily just in the office. He said that he is doing better he says it has not hurt nearly as much    Objective: There were no vitals filed for this visit. Physical Exam:     Skin - incision is well healed with no redness or drainage most specifically the small incision I made to remove the screw has healed and we have removed the Prolene suture from this  Motor and sensory function intact in RIGHT LOWER extremity  Pulses palpable in RIGHT LOWER extremity     XRAY FINDINGS:  No new x-rays today    Assessment:     Just over 3 months out from retrograde intramedullary nail fixation of a distal femur fracture    Plan:     I think he and he can essentially be activity as tolerated. I am going to see him back in 6 weeks for 1 final visit. Just to make sure his fracture has completely healed. I will see him back in about 6 weeks.   He will be about 5 months out at that time    Signed By: Royal Clemente MD     November 21, 2023

## 2023-11-28 ENCOUNTER — HOSPITAL ENCOUNTER (OUTPATIENT)
Dept: PHYSICAL THERAPY | Age: 63
Setting detail: RECURRING SERIES
Discharge: HOME | End: 2023-12-01
Attending: ORTHOPAEDIC SURGERY
Payer: COMMERCIAL

## 2023-11-28 DIAGNOSIS — M79.604 PAIN IN RIGHT LEG: Primary | ICD-10-CM

## 2023-11-28 DIAGNOSIS — M62.81 MUSCLE WEAKNESS (GENERALIZED): ICD-10-CM

## 2023-11-28 DIAGNOSIS — E78.00 PURE HYPERCHOLESTEROLEMIA: ICD-10-CM

## 2023-11-28 DIAGNOSIS — R26.89 OTHER ABNORMALITIES OF GAIT AND MOBILITY: ICD-10-CM

## 2023-11-28 PROCEDURE — 97110 THERAPEUTIC EXERCISES: CPT

## 2023-11-28 PROCEDURE — 97530 THERAPEUTIC ACTIVITIES: CPT

## 2023-11-28 RX ORDER — ATORVASTATIN CALCIUM 40 MG/1
40 TABLET, FILM COATED ORAL DAILY
Qty: 90 TABLET | Refills: 0 | Status: SHIPPED | OUTPATIENT
Start: 2023-11-28

## 2023-11-28 ASSESSMENT — PAIN SCALES - GENERAL: PAINLEVEL_OUTOF10: 0

## 2023-11-28 NOTE — PROGRESS NOTES
lateral lunges today. Today had pt ambulate around gym without use of his cane with intermittent cues for HS initial contact, and increased knee flexion with swing phase. Communication/Consultation: recommended pt begin to ambulate without AD when in community  Equipment provided: RTB, GTB, Blue TB  Recommendations/Intent for next treatment session: Next visit will focus on advancements to more challenging activities. Progress repetitions, weight, and complexity of functional movement per pt tolerance and as indicated.     >Total Treatment Billable Duration: 40 minutes  Time In: 0845  Time Out: 0926    Osmani Chris, PT, DPT    Charge Capture  Post Session Pain  PT Visit Info  [employer] Portal  MD Guidelines  Scanned Media  Benefits  MyChart    Future Appointments   Date Time Provider [address]   11/30/2023  8:45 AM Osmani Chris PT [facility]   12/5/2023  8:00 AM Osmani Chris PT [facility]   1/2/2024  8:00 AM Kala Jesus MD [facility] [place] [facility]   1/4/2024 10:40 AM Claudette Lawson, DO [facility] [place] [facility]   2/28/2024  3:20 PM Grace Marshall, APRN - CNP [facility] [facility]

## 2023-11-30 ENCOUNTER — HOSPITAL ENCOUNTER (OUTPATIENT)
Dept: PHYSICAL THERAPY | Age: 63
Setting detail: RECURRING SERIES
End: 2023-11-30
Attending: ORTHOPAEDIC SURGERY
Payer: COMMERCIAL

## 2023-11-30 DIAGNOSIS — M62.81 MUSCLE WEAKNESS (GENERALIZED): ICD-10-CM

## 2023-11-30 DIAGNOSIS — M79.604 PAIN IN RIGHT LEG: Primary | ICD-10-CM

## 2023-11-30 DIAGNOSIS — R26.89 OTHER ABNORMALITIES OF GAIT AND MOBILITY: ICD-10-CM

## 2023-11-30 PROCEDURE — 97530 THERAPEUTIC ACTIVITIES: CPT

## 2023-11-30 PROCEDURE — 97110 THERAPEUTIC EXERCISES: CPT

## 2023-11-30 ASSESSMENT — PAIN SCALES - GENERAL: PAINLEVEL_OUTOF10: 0

## 2023-11-30 NOTE — PROGRESS NOTES
Claudine Gay  : 1960  Primary: [address] (Commercial)  Secondary:  Vane L[address]pez [place] [address]  Phone: [phone]  Fax: [phone] Plan Frequency: 2x/week for 12 weeks    Plan of Care/Certification Expiration Date: 24            >PT Visit Info:  Plan Frequency: 2x/week for 12 weeks  Plan of Care/Certification Expiration Date: 24  Total # of Visits Approved: 40  Total # of Visits to Date: 15  Progress Note Counter: 7  Progress Note Due Date: 12/10/23 (or 10th visit)     Visit Count: 15    OUTPATIENT PHYSICAL THERAPY:  Treatment Note 2023       Episode (s/p right closed comminuted intra-articular fx of femur) Appt Desk             Treatment Diagnosis:    Visit Diagnoses         Codes    Pain in right leg    -  Primary M79.604    Muscle weakness (generalized)     M62.81    Other abnormalities of gait and mobility     R26.89          Medical/Referring Diagnosis:   Closed comminuted intra-articular fracture of distal femur, right, initial encounter [facility]) [P85.397R]  Referring Physician: Juan North MD MD Orders: PT Eval and Treat  Date of Onset: Onset Date: 23       Allergies: Cat hair extract  Restrictions/Precautions:  Restrictions/Precautions: Other (comment) (WBAT)  Right Lower Extremity Weight Bearing: Weight Bearing As Tolerated  Left Lower Extremity Weight Bearing: Weight Bearing As Tolerated (For transfers only)       Interventions Planned (Treatment may consist of any combination of the following):    Current Treatment Recommendations: Strengthening; ROM; Balance training; Functional mobility training; Transfer training; Endurance training; Gait training; Stair training; Neuromuscular re-education; Manual; Home exercise program; Modalities;  Therapeutic activities       >Subjective Comments: no new developments      >Initial:     0/10       >Post Session:       010     Medications Last challenging activities. Progress repetitions, weight, and complexity of functional movement per pt tolerance and as indicated. Reassessment next visit for PN.     >Total Treatment Billable Duration: 43  minutes  Time In: 7235  Time Out: [address], PT, DPT     Charge Capture  Post Session Pain  PT Visit Info  Med[employer] Portal  MD Guidelines  Scanned Media  Benefits  MyChart    Future Appointments   Date Time Provider [address]   12/5/2023  2:30 PM [facility]   1/2/2024  8:00 AM Kala Jesus MD [facility]   1/4/2024 10:40 AM DO [facility]   2/28/2024  3:20 PM Grace Marshall APRN - CNP [facility]

## 2023-12-03 DIAGNOSIS — D50.0 BLOOD LOSS ANEMIA: ICD-10-CM

## 2023-12-05 ENCOUNTER — HOSPITAL ENCOUNTER (OUTPATIENT)
Dept: PHYSICAL THERAPY | Age: 63
Setting detail: RECURRING SERIES
Discharge: HOME | End: 2023-12-08
Attending: ORTHOPAEDIC SURGERY
Payer: COMMERCIAL

## 2023-12-05 DIAGNOSIS — M79.604 PAIN IN RIGHT LEG: Primary | ICD-10-CM

## 2023-12-05 DIAGNOSIS — M62.81 MUSCLE WEAKNESS (GENERALIZED): ICD-10-CM

## 2023-12-05 DIAGNOSIS — R26.89 OTHER ABNORMALITIES OF GAIT AND MOBILITY: ICD-10-CM

## 2023-12-05 PROCEDURE — 97530 THERAPEUTIC ACTIVITIES: CPT

## 2023-12-05 PROCEDURE — 97110 THERAPEUTIC EXERCISES: CPT

## 2023-12-05 RX ORDER — FERROUS SULFATE 325(65) MG
1 TABLET ORAL 2 TIMES DAILY WITH MEALS
Qty: 60 TABLET | Refills: 0 | Status: SHIPPED | OUTPATIENT
Start: 2023-12-05 | End: 2024-01-04 | Stop reason: SDUPTHER

## 2023-12-05 ASSESSMENT — PAIN SCALES - GENERAL: PAINLEVEL_OUTOF10: 0

## 2023-12-05 NOTE — PROGRESS NOTES
Genevieve Cost  : 1960  Primary: [address] (Commercial)  Secondary:  [facility] [place] [address]  Phone: [phone]  Fax: [phone] Plan Frequency: 1x/week    Plan of Care/Certification Expiration Date: 24            >PT Visit Info:  Plan Frequency: 1x/week  Plan of Care/Certification Expiration Date: 24  Total # of Visits Approved: 40  Total # of Visits to Date: 16  Progress Note Counter: 1  Progress Note Due Date: 24 (or 10th visit)     Visit Count: 16    OUTPATIENT PHYSICAL THERAPY:  Treatment Note 2023       Episode (s/p right closed comminuted intra-articular fx of femur) Appt Desk             Treatment Diagnosis:    Visit Diagnoses         Codes    Pain in right leg    -  Primary M79.604    Muscle weakness (generalized)     M62.81    Other abnormalities of gait and mobility     R26.89          Medical/Referring Diagnosis:   Closed comminuted intra-articular fracture of distal femur, right, initial encounter [facility]) [E70.198Y]  Referring Physician: Royal Clemente MD MD Orders: PT Eval and Treat  Date of Onset: Onset Date: 23       Allergies: Cat hair extract  Restrictions/Precautions:  Restrictions/Precautions: Other (comment) (WBAT)  Right Lower Extremity Weight Bearing: Weight Bearing As Tolerated  Left Lower Extremity Weight Bearing: Weight Bearing As Tolerated (For transfers only)       Interventions Planned (Treatment may consist of any combination of the following):    Current Treatment Recommendations: Strengthening; ROM; Balance training; Functional mobility training; Transfer training; Endurance training; Gait training; Stair training; Neuromuscular re-education; Manual; Home exercise program; Modalities;  Therapeutic activities       >Subjective Comments: See progress note from today      >Initial:     010       >Post Session:       010     Medications Last Reviewed:
due to impairments affecting functional mobility. Total Duration:  Time In: 1430  Time Out: 9710     Regarding Shailaarmin Cochran's therapy, I certify that the treatment plan above will be carried out by a therapist or under their direction. Thank you for this referral,    Luly Goode, PT, DPT    Referring Physician Signature: Danielle Adorno MD  No Signature is Required for this note.         Post Session Pain  Charge Capture  PT Visit Info MD Guidelines  [place]

## 2023-12-12 ENCOUNTER — HOSPITAL ENCOUNTER (OUTPATIENT)
Dept: PHYSICAL THERAPY | Age: 63
Setting detail: RECURRING SERIES
Discharge: HOME | End: 2023-12-15
Attending: ORTHOPAEDIC SURGERY
Payer: COMMERCIAL

## 2023-12-12 DIAGNOSIS — M62.81 MUSCLE WEAKNESS (GENERALIZED): ICD-10-CM

## 2023-12-12 DIAGNOSIS — R26.89 OTHER ABNORMALITIES OF GAIT AND MOBILITY: ICD-10-CM

## 2023-12-12 DIAGNOSIS — M79.604 PAIN IN RIGHT LEG: Primary | ICD-10-CM

## 2023-12-12 PROCEDURE — 97110 THERAPEUTIC EXERCISES: CPT

## 2023-12-12 ASSESSMENT — PAIN SCALES - GENERAL: PAINLEVEL_OUTOF10: 0

## 2023-12-12 NOTE — PROGRESS NOTES
Niesha Schaumann  : 1960  Primary: [address] (Commercial)  Secondary:  Letitia López [place] [address]  Phone: [phone]  Fax: [phone] Plan Frequency: 1x/week    Plan of Care/Certification Expiration Date: 24            >PT Visit Info:  Plan Frequency: 1x/week  Plan of Care/Certification Expiration Date: 24  Total # of Visits Approved: 40  Total # of Visits to Date: 17  Progress Note Counter: 2  Progress Note Due Date: 24 (or 10th visit)     Visit Count: 17    OUTPATIENT PHYSICAL THERAPY:  Treatment Note 2023       Episode (s/p right closed comminuted intra-articular fx of femur) Appt Desk             Treatment Diagnosis:    Visit Diagnoses         Codes    Pain in right leg    -  Primary M79.604    Muscle weakness (generalized)     M62.81    Other abnormalities of gait and mobility     R26.89          Medical/Referring Diagnosis:   Closed comminuted intra-articular fracture of distal femur, right, initial encounter [facility]) [A60.216V]  Referring Physician: Cindi Castro MD MD Orders: PT Eval and Treat  Date of Onset: Onset Date: 23       Allergies: Cat hair extract  Restrictions/Precautions:  Restrictions/Precautions: Other (comment) (WBAT)  Right Lower Extremity Weight Bearing: Weight Bearing As Tolerated  Left Lower Extremity Weight Bearing: Weight Bearing As Tolerated (For transfers only)       Interventions Planned (Treatment may consist of any combination of the following):    Current Treatment Recommendations: Strengthening; ROM; Balance training; Functional mobility training; Transfer training; Endurance training; Gait training; Stair training; Neuromuscular re-education; Manual; Home exercise program; Modalities; Therapeutic activities       >Subjective Comments: he went downtown and walked around, did use the cane since the sidewalks are unlevel.  Has been going to the gym daily, uses the

## 2023-12-14 DIAGNOSIS — G47.52 REM BEHAVIORAL DISORDER: Primary | ICD-10-CM

## 2023-12-14 DIAGNOSIS — G25.81 RLS (RESTLESS LEGS SYNDROME): ICD-10-CM

## 2023-12-14 RX ORDER — CLONAZEPAM 0.5 MG/1
0.5 TABLET ORAL NIGHTLY
Qty: 90 TABLET | Refills: 0 | Status: SHIPPED | OUTPATIENT
Start: 2024-01-03 | End: 2024-04-02

## 2023-12-14 NOTE — PROGRESS NOTES
Received correspondence from [facility] that patient is requesting 90day supply of medication. Recently had clonazepam refilled on 12/4/23. Will send in 90 day supply to express script to start on 1/3/24 with 0 refills. Patient has follow up in February scheduled. PDMP reviewed with no signs of abuse. JANE Alcazar - CNP      Orders Placed This Encounter    clonazePAM (KLONOPIN) 0.5 MG tablet     Sig: Take 1 tablet by mouth at bedtime for 90 days.  Max Daily Amount: 0.5 mg     Dispense:  90 tablet     Refill:  0

## 2023-12-26 ENCOUNTER — APPOINTMENT (OUTPATIENT)
Dept: PHYSICAL THERAPY | Age: 63
End: 2023-12-26
Attending: ORTHOPAEDIC SURGERY
Payer: COMMERCIAL

## 2024-01-01 NOTE — PROGRESS NOTES
[place] infant of 39 completed weeks of gestation
stretch with strap in long sitting and with step, use of stepper daily for 10 minutes if he can manage (will cont to use in PT to look at his progress)  10/20: clamshells s/l and seated stretch  10/27: seated HSC with RTB (11/14 GTB)  10/30: std hip abd + ext RTB (11/14 GTB)  11/14: TKE with Blue TB + walking forward lunges    Treatment/Session Summary:    >Treatment Assessment:  Cont to have significant weakness at right quad mms with inability to perform single leg press - next visit will again incorporate LAQ with resistance. Cont to work on toe off phase of gait with exercises that simultaneously provide challenge to quads. Improved gait quality when he is cued to push off with right toe and pretend to kick his bottom. Generally cont to be stiff with RLE when ambulating. Communication/Consultation: PT encouraged patient to performing HEP consistently. Equipment provided: RTB  Recommendations/Intent for next treatment session: Next visit will focus on advancements to more challenging activities. Progress repetitions, weight, and complexity of functional movement per pt tolerance and as indicated.     >Total Treatment Billable Duration: 44 minutes  Time In: 1348  Time Out: 1432    Aureliano Gramajo, PT, DPT    Charge Capture  Post Session Pain  PT Visit Info  [employer] Portal  MD Guidelines  Scanned Media  Benefits  MyChart    Future Appointments   Date Time Provider [address]   11/16/2023  8:45 AM Aureliano Gramajo PT [facility]   11/21/2023  8:00 AM John Hanks MD [facility]   11/21/2023  9:30 AM Aureliano Gramajo PT [facility]   11/28/2023  8:45 AM Aureliano Gramajo PT [facility]   11/30/2023  8:45 AM Aureliano Gramajo PT [facility]   1/4/2024 10:40 AM DO Stas Roberts [facility]   2/28/2024  3:20 PM Alexandru Marshall[facility] - [facility]

## 2024-01-02 ENCOUNTER — OFFICE VISIT (OUTPATIENT)
Dept: ORTHOPEDIC SURGERY | Age: 64
End: 2024-01-02
Payer: COMMERCIAL

## 2024-01-02 ENCOUNTER — HOSPITAL ENCOUNTER (OUTPATIENT)
Dept: PHYSICAL THERAPY | Age: 64
Setting detail: RECURRING SERIES
Discharge: HOME | End: 2024-01-05
Attending: ORTHOPAEDIC SURGERY
Payer: COMMERCIAL

## 2024-01-02 DIAGNOSIS — M62.81 MUSCLE WEAKNESS (GENERALIZED): ICD-10-CM

## 2024-01-02 DIAGNOSIS — R26.89 OTHER ABNORMALITIES OF GAIT AND MOBILITY: ICD-10-CM

## 2024-01-02 DIAGNOSIS — M79.604 PAIN IN RIGHT LEG: Primary | ICD-10-CM

## 2024-01-02 DIAGNOSIS — S72.491A CLOSED COMMINUTED INTRA-ARTICULAR FRACTURE OF DISTAL FEMUR, RIGHT, INITIAL ENCOUNTER (HCC): Primary | ICD-10-CM

## 2024-01-02 PROCEDURE — 97530 THERAPEUTIC ACTIVITIES: CPT

## 2024-01-02 PROCEDURE — 99213 OFFICE O/P EST LOW 20 MIN: CPT | Performed by: ORTHOPAEDIC SURGERY

## 2024-01-02 ASSESSMENT — PAIN SCALES - GENERAL: PAINLEVEL_OUTOF10: 0

## 2024-01-02 NOTE — THERAPY DISCHARGE
Db Cochran  : 1960  Primary: [facility] [place] (Commercial)  Secondary:  [facility] [place] [address] [address]  Phone: [phone]  Fax: [phone] Plan Frequency: D/C from PT    Plan of Care/Certification Expiration Date: 24            PT Visit Info:  Plan Frequency: D/C from PT  Plan of Care/Certification Expiration Date: 24  Total # of Visits Approved: 40  Total # of Visits to Date: 19  Progress Note Counter: 1  Progress Note Due Date: 24 (or 10th visit)      Visit Count: 19                OUTPATIENT PHYSICAL THERAPY:  Progress Report and Discharge Summary 2024   Episode (s/p right closed comminuted intra-articular fx of femur)   Appt Desk         Treatment Diagnosis:    Visit Diagnoses         Codes    Pain in right leg    -  Primary M79.604    Muscle weakness (generalized)     M62.81    Other abnormalities of gait and mobility     R26.89          Medical/Referring Diagnosis:   Closed comminuted intra-articular fracture of distal femur, right, initial encounter ([facility]) [S72.491A]  Surgeon: Geovanny Salazar  Referring Provider: Geovanny Salazar MD MD Orders: PT Eval and Treat   Return MD Appt: TBD    Date of Onset: Onset Date: 23       Allergies: Cat hair extract  Restrictions/Precautions:    Restrictions/Precautions: Other (comment) (WBAT)         Medications Last Reviewed:  2024       SUBJECTIVE     History of Injury/Illness (Reason for Referral):  10/11/23: Db Cochran relates he had fallen when walking his dog on 23 when the animal pulled. This resulted in a right intercondylar supracondylar distal femur fracture. He had surgery that same day - retrograde intramedullary nail fixation of a rather complex distal femur fracture (per MD note). Per chart review: He also has a left base of the fifth metatarsal avulsion fracture that was treated nonoperatively. Db arrives today without

## 2024-01-02 NOTE — PROGRESS NOTES
Db Cochran  : 1960  Primary: [facility] [place] (Commercial)  Secondary:  [facility] [place] [address] [address]  Phone: [phone]  Fax: [phone] Plan Frequency: D/C from PT    Plan of Care/Certification Expiration Date: 24            >PT Visit Info:  Plan Frequency: D/C from PT  Plan of Care/Certification Expiration Date: 24  Total # of Visits Approved: 40  Total # of Visits to Date: 19  Progress Note Counter: 1  Progress Note Due Date: 24 (or 10th visit)     Visit Count: 19    OUTPATIENT PHYSICAL THERAPY:  Treatment Note 2024       Episode (s/p right closed comminuted intra-articular fx of femur) Appt Desk             Treatment Diagnosis:    Visit Diagnoses         Codes    Pain in right leg    -  Primary M79.604    Muscle weakness (generalized)     M62.81    Other abnormalities of gait and mobility     R26.89          Medical/Referring Diagnosis:   Closed comminuted intra-articular fracture of distal femur, right, initial encounter ([facility]) [S72.491A]  Referring Physician: Geovanny Salazar MD MD Orders: PT Eval and Treat  Date of Onset: Onset Date: 23       Allergies: Cat hair extract  Restrictions/Precautions:  Restrictions/Precautions: Other (comment) (WBAT)  Right Lower Extremity Weight Bearing: Weight Bearing As Tolerated  Left Lower Extremity Weight Bearing: Weight Bearing As Tolerated (For transfers only)       Interventions Planned (Treatment may consist of any combination of the following):    Current Treatment Recommendations: Strengthening; ROM; Balance training; Functional mobility training; Transfer training; Endurance training; Gait training; Stair training; Neuromuscular re-education; Manual; Home exercise program; Modalities; Therapeutic activities       >Subjective Comments: see PT progress/discharge note from today    >Initial:     010       >Post Session:       010     Medications Last

## 2024-01-02 NOTE — PROGRESS NOTES
Progress Note    Patient: Db Cochran MRN: [id number]  SSN: [id number]    YOB: 1960  Age: 63 y.o.  Sex: male        1/2/2024      Subjective:     Patient is here today in follow-up and he is now about 5 months out from distal femur fracture treated with intramedullary nail fixation.  He is really doing well he says he only has 1 visit left the therapy he thinks he feels okay he is gotten his motion back so he seems to be very pleased with how he is doing    Objective:     There were no vitals filed for this visit.       Physical Exam:     Skin - incision is well healed with no redness or drainage  Motor and sensory function intact in RIGHT LOWER extremity  Pulses palpable in RIGHT LOWER extremity   He has full extension and about 100 degrees or so of flexion in the office for me today  XRAY FINDINGS:  Sxsowwyvaq-cnygfk-tq right distal femur fracture, findings-AP and lateral views of the right knee and distal femur shows that the distal femur fracture has solidly healed on both AP and lateral projection.  The hardware is intact with no evidence of loosening or failure and the overall alignment is satisfactory.  Impression # healed well aligned right distal femur fracture    Assessment:     Healed well aligned right distal femur fracture    Plan:     I think he can essentially be activity as tolerated.  He says he is retired I think he can basically have no restrictions whatsoever and I think he can follow-up now on as-needed basis    Signed By: Geovanny Salazar MD     January 2, 2024

## 2024-01-04 ENCOUNTER — OFFICE VISIT (OUTPATIENT)
Dept: INTERNAL MEDICINE CLINIC | Facility: CLINIC | Age: 64
End: 2024-01-04
Payer: COMMERCIAL

## 2024-01-04 VITALS
HEIGHT: 68 IN | WEIGHT: 169 LBS | DIASTOLIC BLOOD PRESSURE: 74 MMHG | OXYGEN SATURATION: 92 % | HEART RATE: 52 BPM | BODY MASS INDEX: 25.61 KG/M2 | TEMPERATURE: 98.2 F | SYSTOLIC BLOOD PRESSURE: 134 MMHG

## 2024-01-04 DIAGNOSIS — F41.9 ANXIETY: ICD-10-CM

## 2024-01-04 DIAGNOSIS — J45.20 MILD INTERMITTENT ASTHMA, UNSPECIFIED WHETHER COMPLICATED: ICD-10-CM

## 2024-01-04 DIAGNOSIS — E78.00 PURE HYPERCHOLESTEROLEMIA: ICD-10-CM

## 2024-01-04 DIAGNOSIS — R06.2 WHEEZING: ICD-10-CM

## 2024-01-04 DIAGNOSIS — S72.401D CLOSED FRACTURE OF DISTAL END OF RIGHT FEMUR WITH ROUTINE HEALING, UNSPECIFIED FRACTURE MORPHOLOGY, SUBSEQUENT ENCOUNTER: Primary | ICD-10-CM

## 2024-01-04 DIAGNOSIS — D50.0 BLOOD LOSS ANEMIA: ICD-10-CM

## 2024-01-04 PROCEDURE — 99214 OFFICE O/P EST MOD 30 MIN: CPT | Performed by: INTERNAL MEDICINE

## 2024-01-04 PROCEDURE — 90677 PCV20 VACCINE IM: CPT | Performed by: INTERNAL MEDICINE

## 2024-01-04 PROCEDURE — 90472 IMMUNIZATION ADMIN EACH ADD: CPT | Performed by: INTERNAL MEDICINE

## 2024-01-04 PROCEDURE — 90471 IMMUNIZATION ADMIN: CPT | Performed by: INTERNAL MEDICINE

## 2024-01-04 PROCEDURE — 90715 TDAP VACCINE 7 YRS/> IM: CPT | Performed by: INTERNAL MEDICINE

## 2024-01-04 RX ORDER — ALBUTEROL SULFATE 90 UG/1
2 AEROSOL, METERED RESPIRATORY (INHALATION) 4 TIMES DAILY PRN
Qty: 18 G | Refills: 5 | Status: SHIPPED | OUTPATIENT
Start: 2024-01-04

## 2024-01-04 RX ORDER — FERROUS SULFATE 325(65) MG
1 TABLET ORAL 2 TIMES DAILY WITH MEALS
Qty: 60 TABLET | Refills: 5 | Status: SHIPPED | OUTPATIENT
Start: 2024-01-04

## 2024-01-04 RX ORDER — ATORVASTATIN CALCIUM 40 MG/1
40 TABLET, FILM COATED ORAL DAILY
Qty: 90 TABLET | Refills: 1 | Status: SHIPPED | OUTPATIENT
Start: 2024-01-04

## 2024-01-04 RX ORDER — PAROXETINE HYDROCHLORIDE 40 MG/1
40 TABLET, FILM COATED ORAL DAILY
Qty: 90 TABLET | Refills: 1 | Status: SHIPPED | OUTPATIENT
Start: 2024-01-04

## 2024-01-04 ASSESSMENT — PATIENT HEALTH QUESTIONNAIRE - PHQ9
SUM OF ALL RESPONSES TO PHQ QUESTIONS 1-9: 0
SUM OF ALL RESPONSES TO PHQ QUESTIONS 1-9: 0
1. LITTLE INTEREST OR PLEASURE IN DOING THINGS: 0
SUM OF ALL RESPONSES TO PHQ9 QUESTIONS 1 & 2: 0
2. FEELING DOWN, DEPRESSED OR HOPELESS: 0
SUM OF ALL RESPONSES TO PHQ QUESTIONS 1-9: 0
SUM OF ALL RESPONSES TO PHQ QUESTIONS 1-9: 0

## 2024-01-04 NOTE — PROGRESS NOTES
Db Cochran (: 1960) is a 63 y.o. male, here for evaluation of the following chief complaint(s):  3 Month Follow-Up (Pt is here for routine 3 month check up. Pt has no complaints today.)       ASSESSMENT/PLAN:  1. Closed fracture of distal end of right femur with routine healing, unspecified fracture morphology, subsequent encounter  2. Pure hypercholesterolemia  -     atorvastatin (LIPITOR) 40 MG tablet; Take 1 tablet by mouth daily, Disp-90 tablet, R-1Normal  3. Blood loss anemia  -     ferrous sulfate (FEROSUL) 325 (65 Fe) MG tablet; Take 1 tablet by mouth 2 times daily (with meals) with meals, Disp-60 tablet, R-5Normal  4. Anxiety  -     PARoxetine (PAXIL) 40 MG tablet; Take 1 tablet by mouth daily, Disp-90 tablet, R-1Normal  5. Wheezing  -     albuterol sulfate HFA (VENTOLIN HFA) 108 (90 Base) MCG/ACT inhaler; Inhale 2 puffs into the lungs 4 times daily as needed for Wheezing, Disp-18 g, R-5Normal  6. Mild intermittent asthma, unspecified whether complicated  -     beclomethasone (QVAR REDIHALER) 40 MCG/ACT AERB inhaler; Inhale 1 puff into the lungs in the morning and 1 puff in the evening. Rinse mouth after use.., Disp-2 each, R-3Normal             SUBJECTIVE/OBJECTIVE:  HPI:Patient is a very pleasant 63-year-old male with a complex medical history including closed femoral fracture of the right leg.  Patient is healing quite well.  He is established with orthopedic surgery and has been cleared from their standpoint.  Lipid panel elevated previously in the year.  Will return to clinic in 6 months time to discuss.  Social History     Tobacco Use    Smoking status: Never    Smokeless tobacco: Never   Vaping Use    Vaping Use: Never used   Substance Use Topics    Alcohol use: Yes    Drug use: Never     Vitals:    24 1022   BP: 134/74   Site: Left Upper Arm   Position: Sitting   Cuff Size: Medium Adult   Pulse: 52   Temp: 98.2 °F (36.8 °C)   SpO2: 92%   Weight: 76.7 kg (169 lb)   Height:

## 2024-01-04 NOTE — ACP (ADVANCE CARE PLANNING)
Advance Care Planning   The patient has the following advanced directives on file:  Advance Directives       Power of  Living Will ACP-Advance Directive ACP-Power of     Not on File Not on File Not on File Not on File            The patient has appointed the following active healthcare agents:    Primary Decision Maker: MIALDYS [place] - Spouse - [phone]    The Patient has the following current code status:    Code Status: Full Code    Visit Documentation:  I discussed Advance Care Planning with Db [place] today which included the importance of making their choices for care and treatment in the case of a health event that adversely affects their decision-making abilities. He has not completed the Advance Care Directives. He does not have an active health care agent at this time.  Db [place] was encouraged to complete the declaration forms and provide a signed copy of his medical records.           Aman Hurley  1/4/2024

## 2024-02-08 DIAGNOSIS — E55.9 VITAMIN D DEFICIENCY: ICD-10-CM

## 2024-02-26 DIAGNOSIS — E78.00 PURE HYPERCHOLESTEROLEMIA: ICD-10-CM

## 2024-02-27 RX ORDER — ATORVASTATIN CALCIUM 40 MG/1
40 TABLET, FILM COATED ORAL DAILY
Qty: 90 TABLET | Refills: 3 | OUTPATIENT
Start: 2024-02-27

## 2024-02-27 NOTE — PROGRESS NOTES
Breath sounds clear.   HEART:   There is a regular rate and rhythm.  No murmur, rub, or gallop.  There is no edema in the lower extremities.   ABDOMEN:   Soft and non-tender.  No hepatosplenomegaly.  Bowel sounds are normal.     NEURO:   The patient is alert and oriented to person, place, and time.  Memory appears intact and mood is normal.  No gross sensorimotor deficits are present.          ASSESSMENT:  (Medical Decision Making)       ICD-10-CM    1. RICCI (obstructive sleep apnea)  G47.33 DME - DURABLE MEDICAL EQUIPMENT - Patient is using, compliant and benefiting from Pap therapy. Continue current settings as AHI is down to 2.7 events per hour.  I did strongly encourage him to increase his hourly usage of CPAP at night and avoid going to sleep in his living room      2. Nocturnal hypoxemia  G47.34 Improved on PAP therapy      3. RLS (restless legs syndrome)  G25.81 clonazePAM (KLONOPIN) 0.5 MG tablet -denies any symptoms of restless syndrome while taking this medication.      4. REM behavioral disorder  G47.52 clonazePAM (KLONOPIN) 0.5 MG tablet -reports her behavior is controlled well with medication.            PLAN:    Continue CPAP 10 cm H2O with nightly compliance  New CPAP supplies ordered  Klonopin refill  Recommendations as above  Follow-up in 6 months or sooner if needed    Orders Placed This Encounter    DME - DURABLE MEDICAL EQUIPMENT     [facility] [place]  Phone: [address]  [facility] [address]  Dept: [phone]      Patient Name: Db Cochran  : 1960  Gender: male  Address: [address] [place] [address]  Patient phone: [phone] (home)       Primary Insurance: Payor: [employer] / Plan: [employer] HMO / Product Type: *No Product type* /   Subscriber ID: [id number] - (Commercial)      AMB Supply Order  Order Details     DME Location: [facility]   Order Date: 2024   The primary encounter diagnosis was RICCI (obstructive sleep apnea). Diagnoses

## 2024-02-28 ENCOUNTER — OFFICE VISIT (OUTPATIENT)
Dept: SLEEP MEDICINE | Age: 64
End: 2024-02-28
Payer: COMMERCIAL

## 2024-02-28 VITALS
OXYGEN SATURATION: 92 % | WEIGHT: 170 LBS | BODY MASS INDEX: 25.76 KG/M2 | HEART RATE: 71 BPM | HEIGHT: 68 IN | TEMPERATURE: 97.7 F | DIASTOLIC BLOOD PRESSURE: 74 MMHG | SYSTOLIC BLOOD PRESSURE: 114 MMHG

## 2024-02-28 DIAGNOSIS — G47.34 NOCTURNAL HYPOXEMIA: ICD-10-CM

## 2024-02-28 DIAGNOSIS — G47.52 REM BEHAVIORAL DISORDER: ICD-10-CM

## 2024-02-28 DIAGNOSIS — G25.81 RLS (RESTLESS LEGS SYNDROME): ICD-10-CM

## 2024-02-28 DIAGNOSIS — G47.33 OSA (OBSTRUCTIVE SLEEP APNEA): Primary | ICD-10-CM

## 2024-02-28 PROCEDURE — 99213 OFFICE O/P EST LOW 20 MIN: CPT | Performed by: NURSE PRACTITIONER

## 2024-02-28 RX ORDER — CLONAZEPAM 0.5 MG/1
0.5 TABLET ORAL NIGHTLY
Qty: 90 TABLET | Refills: 1 | Status: SHIPPED | OUTPATIENT
Start: 2024-02-28 | End: 2024-08-26

## 2024-02-28 ASSESSMENT — SLEEP AND FATIGUE QUESTIONNAIRES
HOW LIKELY ARE YOU TO NOD OFF OR FALL ASLEEP WHILE WATCHING TV: 2
HOW LIKELY ARE YOU TO NOD OFF OR FALL ASLEEP WHILE SITTING QUIETLY AFTER LUNCH WITHOUT ALCOHOL: 2
HOW LIKELY ARE YOU TO NOD OFF OR FALL ASLEEP WHILE SITTING INACTIVE IN A PUBLIC PLACE: 2
HOW LIKELY ARE YOU TO NOD OFF OR FALL ASLEEP WHILE LYING DOWN TO REST IN THE AFTERNOON WHEN CIRCUMSTANCES PERMIT: 3
HOW LIKELY ARE YOU TO NOD OFF OR FALL ASLEEP IN A CAR, WHILE STOPPED FOR A FEW MINUTES IN TRAFFIC: 0
HOW LIKELY ARE YOU TO NOD OFF OR FALL ASLEEP WHILE SITTING AND READING: 2
ESS TOTAL SCORE: 13
HOW LIKELY ARE YOU TO NOD OFF OR FALL ASLEEP WHILE SITTING AND TALKING TO SOMEONE: 0
HOW LIKELY ARE YOU TO NOD OFF OR FALL ASLEEP WHEN YOU ARE A PASSENGER IN A CAR FOR AN HOUR WITHOUT A BREAK: 2

## 2024-02-28 NOTE — PATIENT INSTRUCTIONS
Continue CPAP 10 cm H2O with nightly compliance  New CPAP supplies ordered  Klonopin refill  Recommendations as above  Follow-up in 6 months or sooner if needed

## 2024-03-27 DIAGNOSIS — G47.52 REM BEHAVIORAL DISORDER: ICD-10-CM

## 2024-03-27 DIAGNOSIS — G25.81 RLS (RESTLESS LEGS SYNDROME): ICD-10-CM

## 2024-03-27 RX ORDER — CLONAZEPAM 0.5 MG/1
0.5 TABLET ORAL NIGHTLY
Qty: 90 TABLET | Refills: 1 | Status: CANCELLED | OUTPATIENT
Start: 2024-03-27 | End: 2024-09-23

## 2024-03-29 ENCOUNTER — TELEPHONE (OUTPATIENT)
Dept: SLEEP MEDICINE | Age: 64
End: 2024-03-29

## 2024-03-29 NOTE — TELEPHONE ENCOUNTER
Called patient and left a message telling him that I needed to speak to him about his Klonopin.  I will try him again later today.  I did advise him that I will be out of clinic next week so I needed to speak to him today.

## 2024-03-29 NOTE — TELEPHONE ENCOUNTER
I tried calling the patient numerous times today to discuss how his medication was lost.  I did speak with Dr. Goldman about the situation.  He recommended that I advised the patient to take melatonin for his REM behavior disorder until he can get his next refill of Klonopin.  I did leave a voicemail for the patient explaining to him that the Klonopin is a controlled medication and his pharmacy would probably not allow refill of this medication anyway.  Advised him to take melatonin 10 mg nightly until he can get his next refill Klonopin.

## 2024-04-13 DIAGNOSIS — E55.9 VITAMIN D DEFICIENCY: ICD-10-CM

## 2024-07-01 ENCOUNTER — OFFICE VISIT (OUTPATIENT)
Dept: SLEEP MEDICINE | Age: 64
End: 2024-07-01
Payer: COMMERCIAL

## 2024-07-01 VITALS
DIASTOLIC BLOOD PRESSURE: 71 MMHG | WEIGHT: 172 LBS | HEART RATE: 66 BPM | SYSTOLIC BLOOD PRESSURE: 115 MMHG | BODY MASS INDEX: 26.07 KG/M2 | OXYGEN SATURATION: 91 % | HEIGHT: 68 IN

## 2024-07-01 DIAGNOSIS — G47.34 NOCTURNAL HYPOXEMIA: ICD-10-CM

## 2024-07-01 DIAGNOSIS — G25.81 RLS (RESTLESS LEGS SYNDROME): ICD-10-CM

## 2024-07-01 DIAGNOSIS — G47.33 OSA (OBSTRUCTIVE SLEEP APNEA): Primary | ICD-10-CM

## 2024-07-01 DIAGNOSIS — G47.52 REM BEHAVIORAL DISORDER: ICD-10-CM

## 2024-07-01 PROCEDURE — 99213 OFFICE O/P EST LOW 20 MIN: CPT | Performed by: NURSE PRACTITIONER

## 2024-07-01 RX ORDER — CLONAZEPAM 0.5 MG/1
0.5 TABLET ORAL NIGHTLY
Qty: 90 TABLET | Refills: 1 | Status: SHIPPED | OUTPATIENT
Start: 2024-07-01 | End: 2024-12-28

## 2024-07-01 ASSESSMENT — SLEEP AND FATIGUE QUESTIONNAIRES
ESS TOTAL SCORE: 14
HOW LIKELY ARE YOU TO NOD OFF OR FALL ASLEEP IN A CAR, WHILE STOPPED FOR A FEW MINUTES IN TRAFFIC: SLIGHT CHANCE OF DOZING
HOW LIKELY ARE YOU TO NOD OFF OR FALL ASLEEP WHILE SITTING AND TALKING TO SOMEONE: SLIGHT CHANCE OF DOZING
HOW LIKELY ARE YOU TO NOD OFF OR FALL ASLEEP WHILE SITTING INACTIVE IN A PUBLIC PLACE: MODERATE CHANCE OF DOZING
HOW LIKELY ARE YOU TO NOD OFF OR FALL ASLEEP WHEN YOU ARE A PASSENGER IN A CAR FOR AN HOUR WITHOUT A BREAK: MODERATE CHANCE OF DOZING
HOW LIKELY ARE YOU TO NOD OFF OR FALL ASLEEP WHILE WATCHING TV: MODERATE CHANCE OF DOZING
HOW LIKELY ARE YOU TO NOD OFF OR FALL ASLEEP WHILE LYING DOWN TO REST IN THE AFTERNOON WHEN CIRCUMSTANCES PERMIT: MODERATE CHANCE OF DOZING
HOW LIKELY ARE YOU TO NOD OFF OR FALL ASLEEP WHILE SITTING AND READING: MODERATE CHANCE OF DOZING
HOW LIKELY ARE YOU TO NOD OFF OR FALL ASLEEP WHILE SITTING QUIETLY AFTER LUNCH WITHOUT ALCOHOL: MODERATE CHANCE OF DOZING

## 2024-07-01 NOTE — PATIENT INSTRUCTIONS
Continue CPAP 10 cm H2O with nightly compliance  New CPAP supplies ordered  Recommendations as above  Follow-up in 6 months or sooner if needed

## 2024-07-01 NOTE — PROGRESS NOTES
[facility]  [address]  [place] [address]  [phone]    Patient Name:  Db Cochran  YOB: 1960      Office Visit 7/1/2024    CHIEF COMPLAINT:    Chief Complaint   Patient presents with    Sleep Apnea    Follow-up         HISTORY OF PRESENT ILLNESS:  Patient is a 64 yo male seen today for follow up of RICCI.  Diagnostic sleep study on 04/29/2011 with an AHI of 10.0 and lowest oxygen saturation of 75%. He is prescribed cpap therapy with a humidifier set at 10 cm with a full face mask. Most recent download reveals AHI on PAP therapy is 2.6, leak is median 1.0 and 19.0 at 95th percentile and the hourly usage is 9 hours 12 minutes nightly. The overall use is 976 hours with days greater than four hours at 100/122. The patient is compliant with the Pap therapy and is feeling better as a result.    His hourly usage with CPAP has improved since his last visit.  He reports that he awakens in the mornings fully refreshed but does still have some daytime sleepiness.  Dallas score is 14/24.  He continues to take Klonopin 0.5 mg nightly and reports that he is not having any sleepwalking or acting out of dreams.  States that he has not had any problems with restless legs either while on the medication.  He denies any major medical changes since his last visit.  Reports that his weight is consistently been around 172 pounds.  His blood pressure is well-controlled today.    Dallas Sleepiness Scale      7/1/2024    10:21 AM 2/28/2024     3:22 PM 10/19/2023     1:00 PM 5/31/2023     4:05 PM   Sleep Medicine   Sitting and reading 2 2 2 2   Watching TV 2 2 1 2   Sitting, inactive in a public place (e.g. a theatre or a meeting) 2 2 1 2   As a passenger in a car for an hour without a break 2 2 1 1   Lying down to rest in the afternoon when circumstances permit 2 3 3 1   Sitting and talking to someone 1 0 0 1   Sitting quietly after a lunch without alcohol 2 2 1 2   In a car, while

## 2024-09-25 ENCOUNTER — PATIENT MESSAGE (OUTPATIENT)
Dept: SLEEP MEDICINE | Age: 64
End: 2024-09-25

## 2024-11-19 ENCOUNTER — APPOINTMENT (RX ONLY)
Dept: URBAN - METROPOLITAN AREA CLINIC 23 | Facility: CLINIC | Age: 64
Setting detail: DERMATOLOGY
End: 2024-11-19

## 2024-11-19 DIAGNOSIS — D22 MELANOCYTIC NEVI: ICD-10-CM

## 2024-11-19 DIAGNOSIS — Z71.89 OTHER SPECIFIED COUNSELING: ICD-10-CM

## 2024-11-19 DIAGNOSIS — L82.1 OTHER SEBORRHEIC KERATOSIS: ICD-10-CM

## 2024-11-19 DIAGNOSIS — L57.8 OTHER SKIN CHANGES DUE TO CHRONIC EXPOSURE TO NONIONIZING RADIATION: ICD-10-CM

## 2024-11-19 PROBLEM — D22.5 MELANOCYTIC NEVI OF TRUNK: Status: ACTIVE | Noted: 2024-11-19

## 2024-11-19 PROCEDURE — ? COUNSELING

## 2024-11-19 PROCEDURE — 99203 OFFICE O/P NEW LOW 30 MIN: CPT

## 2024-11-19 PROCEDURE — ? SUNSCREEN RECOMMENDATIONS

## 2024-11-19 ASSESSMENT — LOCATION DETAILED DESCRIPTION DERM
LOCATION DETAILED: POSTERIOR MID-PARIETAL SCALP
LOCATION DETAILED: RIGHT SUPERIOR CENTRAL MALAR CHEEK
LOCATION DETAILED: LEFT SUPERIOR MEDIAL MIDBACK
LOCATION DETAILED: RIGHT MEDIAL FRONTAL SCALP
LOCATION DETAILED: LEFT SUPERIOR LATERAL MALAR CHEEK
LOCATION DETAILED: LEFT MEDIAL INFERIOR CHEST
LOCATION DETAILED: RIGHT INFERIOR UPPER BACK
LOCATION DETAILED: LEFT SUPERIOR LATERAL UPPER BACK
LOCATION DETAILED: SUPERIOR MID FOREHEAD
LOCATION DETAILED: LEFT ANTERIOR DISTAL UPPER ARM

## 2024-11-19 ASSESSMENT — LOCATION SIMPLE DESCRIPTION DERM
LOCATION SIMPLE: LEFT CHEEK
LOCATION SIMPLE: LEFT UPPER BACK
LOCATION SIMPLE: POSTERIOR SCALP
LOCATION SIMPLE: LEFT UPPER ARM
LOCATION SIMPLE: LEFT LOWER BACK
LOCATION SIMPLE: RIGHT SCALP
LOCATION SIMPLE: CHEST
LOCATION SIMPLE: RIGHT UPPER BACK
LOCATION SIMPLE: SUPERIOR FOREHEAD
LOCATION SIMPLE: RIGHT CHEEK

## 2024-11-19 ASSESSMENT — LOCATION ZONE DERM
LOCATION ZONE: ARM
LOCATION ZONE: TRUNK
LOCATION ZONE: FACE
LOCATION ZONE: SCALP

## 2024-11-19 NOTE — PROCEDURE: SUNSCREEN RECOMMENDATIONS
General Sunscreen Counseling: I recommended a broad spectrum sunscreen with a SPF of 30 or higher.  I explained that SPF 30 sunscreens block approximately 97 percent of the sun's harmful rays.  Sunscreens should be applied at least 15 minutes prior to expected sun exposure and then every 2 hours after that as long as sun exposure continues. If swimming or exercising sunscreen should be reapplied every 45 minutes to an hour after getting wet or sweating.  One ounce, or the equivalent of a shot glass full of sunscreen, is adequate to protect the skin not covered by a bathing suit. I also recommended a lip balm with a sunscreen as well. Sun protective clothing can be used in lieu of sunscreen but must be worn the entire time you are exposed to the sun's rays.
Detail Level: Detailed
Products Recommended: All sunscreens sold over the counter are FDA-approved and will work well if applied as described above. The following are products that I like for various areas of the body:\\nFace:\\n- Curated Protect SPF40 sunscreen with CE Ferulic\\n- SkinCeuticals Physical Fusion UV Defense SPF 50\\n- EltaMD UV Clear Broad-Spectrum SPF 46\\n- Revision Intellishade SPF 45\\n\\nBody: \\n- EltaMD UV Spray Broad-Spectrum SPF 46\\n- EltaMD UV Pure Broad-Spectrum SPF 47\\n- EltaMD UV Sport Broad-Spectrum SPF 50\\n- Neutrogena Ultra Sheer Face & Body Sunscreen Stick SPF 70\\n- Neutrogena Sheer Zinc Broad-Spectrum SPF 50\\n\\nLips:\\n- Aquaphor Lip Protectant + Sunscreen SPF 30\\n- EltaMD UV Lip [place] Broad-Spectrum SPF 36\\n\\nScalp:\\n- Supergoop Poof Part Powder SPF 50

## 2025-01-02 ENCOUNTER — OFFICE VISIT (OUTPATIENT)
Dept: SLEEP MEDICINE | Age: 65
End: 2025-01-02
Payer: COMMERCIAL

## 2025-01-02 VITALS
DIASTOLIC BLOOD PRESSURE: 74 MMHG | BODY MASS INDEX: 27.43 KG/M2 | HEIGHT: 68 IN | TEMPERATURE: 97.4 F | RESPIRATION RATE: 19 BRPM | WEIGHT: 181 LBS | SYSTOLIC BLOOD PRESSURE: 119 MMHG | HEART RATE: 64 BPM | OXYGEN SATURATION: 94 %

## 2025-01-02 DIAGNOSIS — G47.33 OSA (OBSTRUCTIVE SLEEP APNEA): Primary | ICD-10-CM

## 2025-01-02 DIAGNOSIS — G47.52 RBD (REM BEHAVIORAL DISORDER): ICD-10-CM

## 2025-01-02 DIAGNOSIS — G25.81 RLS (RESTLESS LEGS SYNDROME): ICD-10-CM

## 2025-01-02 DIAGNOSIS — G47.34 NOCTURNAL HYPOXEMIA: ICD-10-CM

## 2025-01-02 PROCEDURE — 99213 OFFICE O/P EST LOW 20 MIN: CPT | Performed by: NURSE PRACTITIONER

## 2025-01-02 PROCEDURE — G2211 COMPLEX E/M VISIT ADD ON: HCPCS | Performed by: NURSE PRACTITIONER

## 2025-01-02 PROCEDURE — 1123F ACP DISCUSS/DSCN MKR DOCD: CPT | Performed by: NURSE PRACTITIONER

## 2025-01-02 RX ORDER — CLONAZEPAM 0.5 MG/1
0.5 TABLET ORAL NIGHTLY
Qty: 90 TABLET | Refills: 1 | Status: SHIPPED | OUTPATIENT
Start: 2025-01-02 | End: 2025-07-01

## 2025-01-02 ASSESSMENT — SLEEP AND FATIGUE QUESTIONNAIRES
HOW LIKELY ARE YOU TO NOD OFF OR FALL ASLEEP WHILE SITTING AND TALKING TO SOMEONE: WOULD NEVER DOZE
HOW LIKELY ARE YOU TO NOD OFF OR FALL ASLEEP WHEN YOU ARE A PASSENGER IN A CAR FOR AN HOUR WITHOUT A BREAK: HIGH CHANCE OF DOZING
HOW LIKELY ARE YOU TO NOD OFF OR FALL ASLEEP WHILE SITTING AND READING: MODERATE CHANCE OF DOZING
HOW LIKELY ARE YOU TO NOD OFF OR FALL ASLEEP WHILE WATCHING TV: MODERATE CHANCE OF DOZING
ESS TOTAL SCORE: 14
HOW LIKELY ARE YOU TO NOD OFF OR FALL ASLEEP WHILE SITTING QUIETLY AFTER LUNCH WITHOUT ALCOHOL: MODERATE CHANCE OF DOZING
HOW LIKELY ARE YOU TO NOD OFF OR FALL ASLEEP WHILE SITTING INACTIVE IN A PUBLIC PLACE: MODERATE CHANCE OF DOZING
HOW LIKELY ARE YOU TO NOD OFF OR FALL ASLEEP IN A CAR, WHILE STOPPED FOR A FEW MINUTES IN TRAFFIC: WOULD NEVER DOZE
HOW LIKELY ARE YOU TO NOD OFF OR FALL ASLEEP WHILE LYING DOWN TO REST IN THE AFTERNOON WHEN CIRCUMSTANCES PERMIT: HIGH CHANCE OF DOZING

## 2025-01-02 NOTE — PROGRESS NOTES
EQUIPMENT     [facility] SLEEP [facility]  Phone: [address]   [facility] [address]  Dept: [phone]      Patient Name: Db Cochran  : 1960  Gender: male  Address: [address] [place] [address] [address]  [facility] [address]  Patient phone: [phone] (home)       Primary Insurance: Payor: AETNA / Plan: AETNA / Product Type: *No Product type* /   Subscriber ID: [id number] - (Commercial)      AMB Supply Order  Order Details     DME Location: [facility]   Order Date: 2025   The primary encounter diagnosis was RICCI (obstructive sleep apnea). Diagnoses of Nocturnal hypoxemia, RLS (restless legs syndrome), and RBD (REM behavioral disorder) were also pertinent to this visit.             (  X   )Supplies Needed       CPAP Machine   (     ) CPAP Unit  (     ) Auto CPAP Unit  (     ) BiLevel Unit  (     ) Auto BiLevel Unit  (     ) ASV   (     ) Bilevel ST      Length of need: 12 months    Pressure:  10 cmH20  EPR:      Starting Ramp Pressure:   cm H20  Ramp Time: min      Patient had a diagnostic Apnea Hypopnea Index (AHI) of :  10.0  *SUPPLIES* Replace all as needed, or per coverage guidelines     Masks Type:  ( x   ) -Full Face Mask (1 per 3 mon)  (  x  ) -Full Mask (1 per month) Interface/Cushion      (  ) -Nasal Mask (1 per 3 mon)  (  ) - Nasal Mask (1 per month) Interface/Cushion  (     ) -Pillow (2 per mon)  (     ) -Fqefnuddm (1 per 6 mon)            Other Supplies:    (   X  )-Luoggaqp (1 per 6 mon)  (   X  )-Evqiuj Tubing (1 per 3 mon)  (   X  )- Disposable Filter (2 per mon)  (   x  )-Nfipoo Humidifier (1 per year)     ( x    )-Ryqgjybeh (sometimes used with Full Face Mask) (1 per 6 mos)  (    )-Tubing-without heat (1 per 3 mos)  (     )-Non-Disposable Filter (1 per 6 mos)  (  x   )-Water Chamber (1 per 6 mos)  (     )-Humidifier non-heated (1 per 5 yrs)      Signed Date:

## 2025-01-02 NOTE — PATIENT INSTRUCTIONS
Continue CPAP 10 cm H2O with nightly compliance  New CPAP supplies ordered  Home sleep study ordered to possibly qualify for inspire or oral appliance  Recommendations as above  Follow-up in 6 months or sooner if needed

## 2025-01-26 ENCOUNTER — HOSPITAL ENCOUNTER (OUTPATIENT)
Dept: SLEEP CENTER | Age: 65
Discharge: HOME | End: 2025-01-28
Payer: MEDICARE

## 2025-01-26 PROCEDURE — 95806 SLEEP STUDY UNATT&RESP EFFT: CPT

## 2025-02-21 ENCOUNTER — TELEPHONE (OUTPATIENT)
Dept: SLEEP MEDICINE | Age: 65
End: 2025-02-21

## 2025-02-21 DIAGNOSIS — G47.34 NOCTURNAL HYPOXEMIA: Primary | ICD-10-CM

## 2025-02-21 NOTE — TELEPHONE ENCOUNTER
Called the patient and reviewed his sleep study results with him.  His new sleep study does show mild to moderate sleep apnea.  We had discussed inspire therapy and that was the reason for the new sleep study.  However he did have severe nocturnal hypoxemia with oxygen saturations less than 89% for 137 minutes.  He denies ever having any lung issues.  Denies ever smoking.  Denies having any shortness of breath presently.  We discussed checking an overnight oximetry while he is wearing his CPAP to assure that his nocturnal hypoxemia is directly related to his RICCI.  If oxygen levels or corrected with CPAP we may still refer to ENT for possible inspire.    Orders Placed This Encounter    Pulse oximetry, overnight     Scheduling Instructions:      Please perform Overnight Oximetry on PAP Therapy.       Please Fax results to: [phone]

## 2025-07-11 ASSESSMENT — SLEEP AND FATIGUE QUESTIONNAIRES
HOW LIKELY ARE YOU TO NOD OFF OR FALL ASLEEP IN A CAR, WHILE STOPPED FOR A FEW MINUTES IN TRAFFIC: WOULD NEVER DOZE
HOW LIKELY ARE YOU TO NOD OFF OR FALL ASLEEP WHILE SITTING AND READING: MODERATE CHANCE OF DOZING
HOW LIKELY ARE YOU TO NOD OFF OR FALL ASLEEP WHILE SITTING AND TALKING TO SOMEONE: WOULD NEVER DOZE
ESS TOTAL SCORE: 10
HOW LIKELY ARE YOU TO NOD OFF OR FALL ASLEEP IN A CAR, WHILE STOPPED FOR A FEW MINUTES IN TRAFFIC: WOULD NEVER DOZE
HOW LIKELY ARE YOU TO NOD OFF OR FALL ASLEEP WHILE SITTING AND READING: MODERATE CHANCE OF DOZING
HOW LIKELY ARE YOU TO NOD OFF OR FALL ASLEEP WHEN YOU ARE A PASSENGER IN A CAR FOR AN HOUR WITHOUT A BREAK: SLIGHT CHANCE OF DOZING
HOW LIKELY ARE YOU TO NOD OFF OR FALL ASLEEP WHILE WATCHING TV: SLIGHT CHANCE OF DOZING
HOW LIKELY ARE YOU TO NOD OFF OR FALL ASLEEP WHEN YOU ARE A PASSENGER IN A CAR FOR AN HOUR WITHOUT A BREAK: SLIGHT CHANCE OF DOZING
HOW LIKELY ARE YOU TO NOD OFF OR FALL ASLEEP WHILE SITTING QUIETLY AFTER LUNCH WITHOUT ALCOHOL: SLIGHT CHANCE OF DOZING
HOW LIKELY ARE YOU TO NOD OFF OR FALL ASLEEP WHILE SITTING INACTIVE IN A PUBLIC PLACE: MODERATE CHANCE OF DOZING
HOW LIKELY ARE YOU TO NOD OFF OR FALL ASLEEP WHILE LYING DOWN TO REST IN THE AFTERNOON WHEN CIRCUMSTANCES PERMIT: HIGH CHANCE OF DOZING
HOW LIKELY ARE YOU TO NOD OFF OR FALL ASLEEP WHILE LYING DOWN TO REST IN THE AFTERNOON WHEN CIRCUMSTANCES PERMIT: HIGH CHANCE OF DOZING
HOW LIKELY ARE YOU TO NOD OFF OR FALL ASLEEP WHILE SITTING AND TALKING TO SOMEONE: WOULD NEVER DOZE
HOW LIKELY ARE YOU TO NOD OFF OR FALL ASLEEP WHILE SITTING INACTIVE IN A PUBLIC PLACE: MODERATE CHANCE OF DOZING
HOW LIKELY ARE YOU TO NOD OFF OR FALL ASLEEP WHILE SITTING QUIETLY AFTER LUNCH WITHOUT ALCOHOL: SLIGHT CHANCE OF DOZING
HOW LIKELY ARE YOU TO NOD OFF OR FALL ASLEEP WHILE WATCHING TV: SLIGHT CHANCE OF DOZING

## 2025-07-14 ENCOUNTER — TELEPHONE (OUTPATIENT)
Dept: PULMONOLOGY | Age: 65
End: 2025-07-14

## 2025-07-14 ENCOUNTER — OFFICE VISIT (OUTPATIENT)
Dept: SLEEP MEDICINE | Age: 65
End: 2025-07-14
Payer: MEDICARE

## 2025-07-14 VITALS
HEIGHT: 67 IN | TEMPERATURE: 97.2 F | BODY MASS INDEX: 27.72 KG/M2 | SYSTOLIC BLOOD PRESSURE: 118 MMHG | WEIGHT: 176.6 LBS | DIASTOLIC BLOOD PRESSURE: 66 MMHG | OXYGEN SATURATION: 97 % | RESPIRATION RATE: 17 BRPM | HEART RATE: 65 BPM

## 2025-07-14 DIAGNOSIS — G47.52 RBD (REM BEHAVIORAL DISORDER): ICD-10-CM

## 2025-07-14 DIAGNOSIS — G47.34 NOCTURNAL HYPOXEMIA: ICD-10-CM

## 2025-07-14 DIAGNOSIS — G47.33 OSA (OBSTRUCTIVE SLEEP APNEA): Primary | ICD-10-CM

## 2025-07-14 DIAGNOSIS — G25.81 RLS (RESTLESS LEGS SYNDROME): ICD-10-CM

## 2025-07-14 PROCEDURE — G2211 COMPLEX E/M VISIT ADD ON: HCPCS | Performed by: NURSE PRACTITIONER

## 2025-07-14 PROCEDURE — 1123F ACP DISCUSS/DSCN MKR DOCD: CPT | Performed by: NURSE PRACTITIONER

## 2025-07-14 PROCEDURE — 99213 OFFICE O/P EST LOW 20 MIN: CPT | Performed by: NURSE PRACTITIONER

## 2025-07-14 RX ORDER — CLONAZEPAM 0.5 MG/1
0.5 TABLET ORAL NIGHTLY
Qty: 90 TABLET | Refills: 1 | Status: SHIPPED | OUTPATIENT
Start: 2025-07-14 | End: 2026-01-10

## 2025-07-14 NOTE — H&P (VIEW-ONLY)
[facility]  [address]  [place] [address]  [phone]    Patient Name:  Db Cochran  YOB: 1960      Office Visit 7/14/2025    CHIEF COMPLAINT:    Chief Complaint   Patient presents with    Sleep Apnea     History of Present Illness  Patient is a 64 y.o. male seen today for follow up of RICCI. Diagnostic sleep study on 04/29/2011 with an AHI of 10.0 and lowest oxygen saturation of 75%. He is prescribed cpap therapy with a humidifier set at 10 cm with a full face mask. Most recent download reveals AHI on PAP therapy is 4.7, leak is median 2.  0 and 25.0 at 95th percentile and the hourly usage is 7 hours 4 minutes nightly. The overall use is 1102 hours with days greater than four hours at 128/180. The patient is compliant with the Pap therapy and is feeling better as a result.     He has undergone a new sleep study since his last visit.  I did review the sleep study in detail with him.  He states that he awakens in the mornings refreshed after wearing CPAP and denies any excessive daytime sleepiness or fatigue.  [place] score is 10/24.  He reports no significant health changes since his last visit. He is currently on vacation and plans to travel to [place] next Friday.    He is currently on Klonopin 0.5 mg nightly for restless legs and REM behavior disorder. He reports that his symptoms worsen if he forgets to take his medication.    MEDICATIONS  Klonopin      [place] Sleepiness Scale      7/11/2025    10:16 AM 1/2/2025     9:58 AM 7/1/2024    10:21 AM 2/28/2024     3:22 PM 10/19/2023     1:00 PM 5/31/2023     4:05 PM   Sleep Medicine   Sitting and reading 2 2 2 2 2 2   Watching TV 1 2 2 2 1 2   Sitting, inactive in a public place (e.g. a theatre or a meeting) 2 2 2 2 1 2   As a passenger in a car for an hour without a break 1 3 2 2 1 1   Lying down to rest in the afternoon when circumstances permit 3 3 2 3 3 1   Sitting and talking to someone 0 0 1 0 0 1   Sitting  quietly after a lunch without alcohol 1 2 2 2 1 2   In a car, while stopped for a few minutes in traffic 0 0 1 0 0 1   [place] Sleepiness Score 10  14 14 13 9 12       Patient-reported          Past Medical History:   Diagnosis Date    Anxiety     High cholesterol     Sleep apnea          Patient Active Problem List   Diagnosis    Establishing care with new doctor, encounter for    RICCI (obstructive sleep apnea)    Bilateral hearing loss    Vitamin D deficiency    Chronic fatigue    Seasonal allergies    Mild intermittent asthma    Hx of retinal detachment    Seborrheic keratosis    Wheezing    Hx of inguinal hernia repair    Anxiety    High cholesterol    Closed comminuted intra-articular fracture of distal femur, right, initial encounter ([facility])    ROE (acute kidney injury)    Blood loss anemia    SVT (supraventricular tachycardia)    Urinary retention    Closed fracture of right distal femur ([facility])    History of femur fracture    Encounter for rehabilitation    Impaired functional mobility, balance, gait, and endurance    Decreased independence with activities of daily living    Nocturnal hypoxemia    RLS (restless legs syndrome)    RBD (REM behavioral disorder)          Past Surgical History:   Procedure Laterality Date    CATARACT EXTRACTION, BILATERAL      FEMUR FRACTURE SURGERY Right 7/31/2023    RIGHT FEMUR RETROGRADE NAIL performed by Geovanny Salazar MD at [facility] MAIN OR           Social History     Socioeconomic History    Marital status:      Spouse name: Not on file    Number of children: Not on file    Years of education: Not on file    Highest education level: Not on file   Occupational History    Not on file   Tobacco Use    Smoking status: Never    Smokeless tobacco: Never   Vaping Use    Vaping status: Never Used   Substance and Sexual Activity    Alcohol use: Yes    Drug use: Never    Sexual activity: Yes     Partners: Female   Other Topics Concern    Not on file   Social History Narrative    Not

## 2025-07-14 NOTE — PATIENT INSTRUCTIONS
Continue CPAP 10 cm H2O with nightly compliance  Message sent to triage to get patient scheduled for DISE  Klonopin Refilled  Recommendations as above  Follow-up in 6 months or sooner if needed

## 2025-07-14 NOTE — TELEPHONE ENCOUNTER
Received: Today  Elver Marshall, APRN - CNP  [facility]  Please get patient scheduled for DISE procedure. Thanks    I have spoken with patient.  He is scheduled for DISE on 7/24 with Dr Ramirez. Will check in at 1030.  Will be NPO and have an adult with him for transportation.  He is aware that he will get a call from pre-assessment prior to procedure.  Order in for ENT referral.

## 2025-07-14 NOTE — PROGRESS NOTES
[facility]  [address]  [place] [address]  [phone]    Patient Name:  Db Cochran  YOB: 1960      Office Visit 7/14/2025    CHIEF COMPLAINT:    Chief Complaint   Patient presents with    Sleep Apnea     History of Present Illness  Patient is a 64 y.o. male seen today for follow up of RICCI. Diagnostic sleep study on 04/29/2011 with an AHI of 10.0 and lowest oxygen saturation of 75%. He is prescribed cpap therapy with a humidifier set at 10 cm with a full face mask. Most recent download reveals AHI on PAP therapy is 4.7, leak is median 2.  0 and 25.0 at 95th percentile and the hourly usage is 7 hours 4 minutes nightly. The overall use is 1102 hours with days greater than four hours at 128/180. The patient is compliant with the Pap therapy and is feeling better as a result.     He has undergone a new sleep study since his last visit.  I did review the sleep study in detail with him.  He states that he awakens in the mornings refreshed after wearing CPAP and denies any excessive daytime sleepiness or fatigue.  [place] score is 10/24.  He reports no significant health changes since his last visit. He is currently on vacation and plans to travel to [place] next Friday.    He is currently on Klonopin 0.5 mg nightly for restless legs and REM behavior disorder. He reports that his symptoms worsen if he forgets to take his medication.    MEDICATIONS  Klonopin      [place] Sleepiness Scale      7/11/2025    10:16 AM 1/2/2025     9:58 AM 7/1/2024    10:21 AM 2/28/2024     3:22 PM 10/19/2023     1:00 PM 5/31/2023     4:05 PM   Sleep Medicine   Sitting and reading 2 2 2 2 2 2   Watching TV 1 2 2 2 1 2   Sitting, inactive in a public place (e.g. a theatre or a meeting) 2 2 2 2 1 2   As a passenger in a car for an hour without a break 1 3 2 2 1 1   Lying down to rest in the afternoon when circumstances permit 3 3 2 3 3 1   Sitting and talking to someone 0 0 1 0 0 1   Sitting

## 2025-07-15 NOTE — PERIOP NOTE
Patient verified name, , and procedure.    Type: 1b; abbreviated assessment per anesthesia guidelines  Labs per surgeon: None  Labs per anesthesia: None      Instructed pt that they will be notified by the Gi Lab for time of arrival. If any questions please call the GI lab at [phone].    Follow diet and prep instructions per office. Nothing to eat or drink after midnight.     Bath or shower the night before and the am of surgery with antibacterial soap. No lotions, oils, powders, cologne on skin. No make up, eye make up or jewelry. Wear loose fitting comfortable, clean clothing.     Must have adult present in building the entire time .     Medications for the day of procedure Atorvastatin, Metoprolol and Paxil    Please hold all vitamins x 7 days prior to procedure and NSAIDS (Aspirin, Excedrin, Goody powders, Motrin, Ibuprofen, Advil, Aleve and Naproxen) x 5 days prior to procedure. Should you have a procedure date that does not allow for the amount of time instructed above, please stop taking vitamins, supplements, and NSAIDS IMMEDIATELY.       The following discharge instructions reviewed with patient: medication given during procedure may cause drowsiness for several hours, therefore, do not drive or operate machinery for remainder of the day, no alcohol on the day of your procedure, resume regular diet and activity unless otherwise directed, for mild sore throat you may use Cepacol throat lozenges or warm salt water gargles as needed, call your physician for any problems or questions. Patient verbalizes understanding.

## 2025-07-23 ENCOUNTER — ANESTHESIA EVENT (OUTPATIENT)
Dept: SURGERY | Age: 65
End: 2025-07-23
Payer: MEDICARE

## 2025-07-23 RX ORDER — SODIUM CHLORIDE, SODIUM LACTATE, POTASSIUM CHLORIDE, CALCIUM CHLORIDE 600; 310; 30; 20 MG/100ML; MG/100ML; MG/100ML; MG/100ML
INJECTION, SOLUTION INTRAVENOUS CONTINUOUS
Status: CANCELLED | OUTPATIENT
Start: 2025-07-23

## 2025-07-23 RX ORDER — ONDANSETRON 2 MG/ML
4 INJECTION INTRAMUSCULAR; INTRAVENOUS
Status: CANCELLED | OUTPATIENT
Start: 2025-07-23

## 2025-07-23 RX ORDER — IPRATROPIUM BROMIDE AND ALBUTEROL SULFATE 2.5; .5 MG/3ML; MG/3ML
1 SOLUTION RESPIRATORY (INHALATION)
Status: CANCELLED | OUTPATIENT
Start: 2025-07-23

## 2025-07-23 RX ORDER — HALOPERIDOL 5 MG/ML
1 INJECTION INTRAMUSCULAR
Status: CANCELLED | OUTPATIENT
Start: 2025-07-23

## 2025-07-23 NOTE — PERIOP NOTE
Preop department called to notify patient of arrival time for scheduled procedure. Instructions given to   - Arrive at OPC Entrance [address]  - Nothing to eat after midnight unless otherwise indicated. No gum, mints, or ice chips. You may have clear liquids two hours prior to arrival to the hospital.   - Have a responsible adult to drive patient to the hospital, stay during surgery, and patient will need supervision 24 hours after anesthesia.   - Use antibacterial soap in shower the night before surgery and on the morning of surgery.       Was patient contacted: pt's spouse   Voicemail left: no  Numbers contacted: [phone]   Arrival time: 1100  Time to stop clear liquids: 0900

## 2025-07-24 ENCOUNTER — HOSPITAL ENCOUNTER (OUTPATIENT)
Age: 65
Setting detail: OUTPATIENT SURGERY
Discharge: HOME | End: 2025-07-24
Attending: INTERNAL MEDICINE | Admitting: INTERNAL MEDICINE
Payer: MEDICARE

## 2025-07-24 ENCOUNTER — ANESTHESIA (OUTPATIENT)
Dept: SURGERY | Age: 65
End: 2025-07-24
Payer: MEDICARE

## 2025-07-24 VITALS
TEMPERATURE: 98.2 F | BODY MASS INDEX: 27.62 KG/M2 | HEART RATE: 58 BPM | SYSTOLIC BLOOD PRESSURE: 127 MMHG | DIASTOLIC BLOOD PRESSURE: 58 MMHG | OXYGEN SATURATION: 97 % | RESPIRATION RATE: 18 BRPM | WEIGHT: 176 LBS | HEIGHT: 67 IN

## 2025-07-24 PROCEDURE — 7100000000 HC PACU RECOVERY - FIRST 15 MIN: Performed by: INTERNAL MEDICINE

## 2025-07-24 PROCEDURE — 7100000001 HC PACU RECOVERY - ADDTL 15 MIN: Performed by: INTERNAL MEDICINE

## 2025-07-24 PROCEDURE — 2709999900 HC NON-CHARGEABLE SUPPLY: Performed by: INTERNAL MEDICINE

## 2025-07-24 PROCEDURE — 2580000003 HC RX 258: Performed by: ANESTHESIOLOGY

## 2025-07-24 PROCEDURE — 7100000010 HC PHASE II RECOVERY - FIRST 15 MIN: Performed by: INTERNAL MEDICINE

## 2025-07-24 PROCEDURE — 3700000001 HC ADD 15 MINUTES (ANESTHESIA): Performed by: INTERNAL MEDICINE

## 2025-07-24 PROCEDURE — 42975 DISE EVAL SLP DO BRTH FLX DX: CPT | Performed by: INTERNAL MEDICINE

## 2025-07-24 PROCEDURE — 6360000002 HC RX W HCPCS: Performed by: NURSE ANESTHETIST, CERTIFIED REGISTERED

## 2025-07-24 PROCEDURE — 3700000000 HC ANESTHESIA ATTENDED CARE: Performed by: INTERNAL MEDICINE

## 2025-07-24 PROCEDURE — 3609127700 HC LARYNGOSCOPY: Performed by: INTERNAL MEDICINE

## 2025-07-24 RX ORDER — SODIUM CHLORIDE 9 MG/ML
INJECTION, SOLUTION INTRAVENOUS PRN
Status: DISCONTINUED | OUTPATIENT
Start: 2025-07-24 | End: 2025-07-24 | Stop reason: HOSPADM

## 2025-07-24 RX ORDER — SODIUM CHLORIDE 0.9 % (FLUSH) 0.9 %
5-40 SYRINGE (ML) INJECTION EVERY 12 HOURS SCHEDULED
Status: DISCONTINUED | OUTPATIENT
Start: 2025-07-24 | End: 2025-07-24 | Stop reason: HOSPADM

## 2025-07-24 RX ORDER — IPRATROPIUM BROMIDE AND ALBUTEROL SULFATE 2.5; .5 MG/3ML; MG/3ML
1 SOLUTION RESPIRATORY (INHALATION)
Status: DISCONTINUED | OUTPATIENT
Start: 2025-07-24 | End: 2025-07-24 | Stop reason: HOSPADM

## 2025-07-24 RX ORDER — SODIUM CHLORIDE 0.9 % (FLUSH) 0.9 %
5-40 SYRINGE (ML) INJECTION PRN
Status: DISCONTINUED | OUTPATIENT
Start: 2025-07-24 | End: 2025-07-24 | Stop reason: HOSPADM

## 2025-07-24 RX ORDER — PROPOFOL 10 MG/ML
INJECTION, EMULSION INTRAVENOUS
Status: DISCONTINUED | OUTPATIENT
Start: 2025-07-24 | End: 2025-07-24 | Stop reason: SDUPTHER

## 2025-07-24 RX ORDER — SODIUM CHLORIDE, SODIUM LACTATE, POTASSIUM CHLORIDE, CALCIUM CHLORIDE 600; 310; 30; 20 MG/100ML; MG/100ML; MG/100ML; MG/100ML
INJECTION, SOLUTION INTRAVENOUS CONTINUOUS
Status: DISCONTINUED | OUTPATIENT
Start: 2025-07-24 | End: 2025-07-24 | Stop reason: HOSPADM

## 2025-07-24 RX ORDER — LIDOCAINE HYDROCHLORIDE 10 MG/ML
1 INJECTION, SOLUTION INFILTRATION; PERINEURAL
Status: DISCONTINUED | OUTPATIENT
Start: 2025-07-24 | End: 2025-07-24 | Stop reason: HOSPADM

## 2025-07-24 RX ADMIN — PROPOFOL 25 MG: 10 INJECTION, EMULSION INTRAVENOUS at 12:36

## 2025-07-24 RX ADMIN — PROPOFOL 25 MG: 10 INJECTION, EMULSION INTRAVENOUS at 12:38

## 2025-07-24 RX ADMIN — PROPOFOL 50 MG: 10 INJECTION, EMULSION INTRAVENOUS at 12:34

## 2025-07-24 RX ADMIN — SODIUM CHLORIDE, SODIUM LACTATE, POTASSIUM CHLORIDE, AND CALCIUM CHLORIDE: .6; .31; .03; .02 INJECTION, SOLUTION INTRAVENOUS at 11:34

## 2025-07-24 ASSESSMENT — PAIN SCALES - GENERAL
PAINLEVEL_OUTOF10: 0
PAINLEVEL_OUTOF10: 0

## 2025-07-24 ASSESSMENT — PAIN - FUNCTIONAL ASSESSMENT: PAIN_FUNCTIONAL_ASSESSMENT: NONE - DENIES PAIN

## 2025-07-24 NOTE — ANESTHESIA POSTPROCEDURE EVALUATION
Department of Anesthesiology  Postprocedure Note    Patient: Db Cochran  MRN: [id number]  YOB: 1960  Date of evaluation: 7/24/2025    Procedure Summary       Date: 07/24/25 Room / Location: [facility] OP OR 01 / [facility]    Anesthesia Start: 1229 Anesthesia Stop: 1258    Procedure: DRUG INDUCED SLEEP ENDOSCOPY in fluoro do not move time as [employer] rep to be with MD, disposible scope to be available (Throat) Diagnosis:       RICCI (obstructive sleep apnea)      (RICCI (obstructive sleep apnea) [G47.33])    Surgeons: Tawanda Ramirez MD Responsible Provider: Jerad Back DO    Anesthesia Type: TIVA ASA Status: 2            Anesthesia Type: No value filed.    Willow Phase I: Willow Score: 8    Willow Phase II: Willow Score: 10    Anesthesia Post Evaluation    Patient location during evaluation: PACU  Level of consciousness: awake and alert  Airway patency: patent  Nausea & Vomiting: no nausea  Cardiovascular status: hemodynamically stable  Respiratory status: acceptable  Hydration status: euvolemic  Comments: Blood pressure (!) 127/58, pulse 58, temperature 98.2 °F (36.8 °C), resp. rate 18, height 1.702 m (5' 7\"), weight 79.8 kg (176 lb), SpO2 97%.  Pain management: satisfactory to patient    No notable events documented.

## 2025-07-24 NOTE — OP NOTE
Evaluation of sleep-disordered breathing by examination of upper airway using an endoscope  CPT 76343    Pre Op Diagnosis: Moderate/Severe obstructive sleep apnea with positive airway pressure intolerance (ICD 10 G47.33)        Consent procured from patient.  Risks/benefits discussed with patient and her  and agreed to procedure.    Timeout done    Anesthesia: IV sedation performed by anesthesiology    Estimated Blood Loss: none    Complications: none    Procedure Findings:  There was no evidence of complete concentric palatal obstruction and the patient does appear to be a candidate anatomically for hypoglossal nerve stimulation therapy.     Description of Procedure:  The patient was brought to the operating room and was anesthetized via the standard drug-induced sleep endoscopy protocol. Propofol bolus and infusion was managed by anesthesiology and continued until conditions that mimic sleep were gradually observed.     Under these conditions, the flexible endoscope was inserted to examine both sides of the nose as well as the pharynx and larynx.         [employer] representative was present during the procedure and agreed with the findings.  Corresponding video evidence present.        Tawanda Ramirez MD

## 2025-07-24 NOTE — INTERVAL H&P NOTE
Update History & Physical    The patient's History and Physical of July 14,  was reviewed with the patient and I examined the patient. There was no change. The surgical site was confirmed by the patient and me.     Plan: The risks, benefits, expected outcome, and alternative to the recommended procedure have been discussed with the patient. Patient understands and wants to proceed with the procedure.     Electronically signed by Tawanda Ramirez MD on 7/24/2025 at 12:26 PM

## 2025-07-24 NOTE — ANESTHESIA PRE PROCEDURE
Department of Anesthesiology  Preprocedure Note       Name:  Db Cochran   Age:  64 y.o.  :  1960                                          MRN:  [id number]         Date:  2025      Surgeon: Surgeon(s):  Tawanda Ramirez MD    Procedure: Procedure(s):  DRUG INDUCED SLEEP ENDOSCOPY in fluoro do not move time as [employer] rep to be with MD, disposible scope to be available    Medications prior to admission:   Prior to Admission medications    Medication Sig Start Date End Date Taking? Authorizing Provider   clonazePAM (KLONOPIN) 0.5 MG tablet Take 1 tablet by mouth at bedtime for 180 days. Max Daily Amount: 0.5 mg 7/14/25 1/10/26 Yes Elver Marshall APRN - CNP   VITAMIN D3 125 MCG (5000 UT) CAPS capsule TAKE ONE CAPSULE BY MOUTH ONE TIME DAILY 4/15/24  Yes Kentrell Garcia DO   atorvastatin (LIPITOR) 40 MG tablet Take 1 tablet by mouth daily 24  Yes Kentrell Garcia DO   ferrous sulfate (FEROSUL) 325 (65 Fe) MG tablet Take 1 tablet by mouth 2 times daily (with meals) with meals 24  Yes Kentrell Garcia DO   metoprolol tartrate (LOPRESSOR) 25 MG tablet Take 1 tablet by mouth 2 times daily 24  Yes Kentrell Garcia DO   PARoxetine (PAXIL) 40 MG tablet Take 1 tablet by mouth daily 24  Yes Kentrell Garcia DO   Multiple Vitamins-Minerals (MULTIVITAMIN ADULT, MINERALS, PO) Take 1 each by mouth daily. 1 gummy   Yes Provider, MD Fozia   albuterol sulfate HFA (VENTOLIN HFA) 108 (90 Base) MCG/ACT inhaler Inhale 2 puffs into the lungs 4 times daily as needed for Wheezing 24   Kentrell Garcia DO   beclomethasone (QVAR REDIHALER) 40 MCG/ACT AERB inhaler Inhale 1 puff into the lungs in the morning and 1 puff in the evening. Rinse mouth after use.. 24   Kentrell Garcia, DO   ascorbic acid (V-R VITAMIN C) 250 MG tablet Take 1 tablet by mouth 2 times daily Take with iron pill 10/3/23 1/2/25  Kentrell Garcia,        Current medications:    Current Facility-Administered Medications   Medication Dose Route

## (undated) DEVICE — SHEET,DRAPE,53X77,STERILE: Brand: MEDLINE

## (undated) DEVICE — 7 DAY SILVER-COATED ANTIMICROBIAL BARRIER DRESSING: Brand: ACTICOAT 7  4" X 5"

## (undated) DEVICE — GLOVE ORANGE PI 7 1/2   MSG9075

## (undated) DEVICE — SINGLE USE SUCTION VALVE MAJ-209: Brand: SINGLE USE SUCTION VALVE (STERILE)

## (undated) DEVICE — MASK O2 UND CHIN AD 7 FT SFT MED CONC STD CONN TBNG VLY CLR

## (undated) DEVICE — BIT DRL L300MM DIA5MM CANN QUIK CPL ADJ STP REUSE

## (undated) DEVICE — SOLUTION IRRIG 1000ML 0.9% SOD CHL USP POUR PLAS BTL

## (undated) DEVICE — SHEET, DRAPE, SPLIT, STERILE: Brand: MEDLINE

## (undated) DEVICE — BIT DRILL CALIBRATED 4.2 EX-LONG

## (undated) DEVICE — GUIDE WIRE, BALL-TIPPED, STERILE

## (undated) DEVICE — SOLUTION IRRIG 1000ML H2O PIC PLAS SHATTERPROOF CONTAINER

## (undated) DEVICE — K-WIRE

## (undated) DEVICE — KIT ARMOR C DRP COLLAPSIBLE AND SELF EXP TOP CVR FOR FLUOROSCOPIC

## (undated) DEVICE — INTENDED FOR TISSUE SEPARATION, AND OTHER PROCEDURES THAT REQUIRE A SHARP SURGICAL BLADE TO PUNCTURE OR CUT.: Brand: BARD-PARKER ® STAINLESS STEEL BLADES

## (undated) DEVICE — PAD,ABDOMINAL,5"X9",ST,LF,25/BX: Brand: MEDLINE INDUSTRIES, INC.

## (undated) DEVICE — TFN: Brand: MEDLINE INDUSTRIES, INC.

## (undated) DEVICE — ROD RMR L950MM DIA2.5MM W/ EXTN BALL TIP

## (undated) DEVICE — GUIDEWIRE ORTH L400MM DIA3.2MM FOR TFN

## (undated) DEVICE — KIT SURG CUST BRONCHSCP CUST SFD

## (undated) DEVICE — SUTURE MCRYL SZ 2-0 L27IN ABSRB UD SH L26MM TAPERPOINT NDL Y417H

## (undated) DEVICE — DRAPE,U/SHT,SPLIT,FILM,60X84,STERILE: Brand: MEDLINE

## (undated) DEVICE — REAMER SHAFT, MOD.TRINKLE: Brand: BIXCUT

## (undated) DEVICE — SINGLE USE BIOPSY VALVE MAJ-210: Brand: SINGLE USE BIOPSY VALVE (STERILE)

## (undated) DEVICE — TUBING, SUCTION, 1/4" X 10', STRAIGHT: Brand: MEDLINE

## (undated) DEVICE — BIT DRL L145MM DIA4.2MM NONSTERILE 3 FLUT NDL PNT QUIK CPL

## (undated) DEVICE — GUIDEWIRE ORTH L300MM DIA2.8MM S STL THRD SHRP TRCR TIP FOR

## (undated) DEVICE — GLOVE SURG SZ 8 L12IN FNGR THK79MIL GRN LTX FREE

## (undated) DEVICE — ENDOSCOPE TRANSPORT: Brand: CINCHPAD™

## (undated) DEVICE — SINGLE PORT MANIFOLD: Brand: NEPTUNE 2

## (undated) DEVICE — DRILL, AO, STERILE